# Patient Record
Sex: MALE | Race: BLACK OR AFRICAN AMERICAN | NOT HISPANIC OR LATINO | URBAN - METROPOLITAN AREA
[De-identification: names, ages, dates, MRNs, and addresses within clinical notes are randomized per-mention and may not be internally consistent; named-entity substitution may affect disease eponyms.]

---

## 2017-07-01 ENCOUNTER — OUTPATIENT (OUTPATIENT)
Dept: OUTPATIENT SERVICES | Facility: HOSPITAL | Age: 72
LOS: 1 days | End: 2017-07-01
Payer: MEDICAID

## 2017-07-12 DIAGNOSIS — R69 ILLNESS, UNSPECIFIED: ICD-10-CM

## 2017-08-01 PROCEDURE — G9001: CPT

## 2021-03-16 ENCOUNTER — APPOINTMENT (OUTPATIENT)
Dept: DISASTER EMERGENCY | Facility: OTHER | Age: 76
End: 2021-03-16
Payer: MEDICARE

## 2021-03-16 PROCEDURE — 0011A: CPT

## 2021-04-13 ENCOUNTER — APPOINTMENT (OUTPATIENT)
Dept: DISASTER EMERGENCY | Facility: OTHER | Age: 76
End: 2021-04-13

## 2023-01-01 ENCOUNTER — NON-APPOINTMENT (OUTPATIENT)
Age: 78
End: 2023-01-01

## 2023-01-01 ENCOUNTER — TRANSCRIPTION ENCOUNTER (OUTPATIENT)
Age: 78
End: 2023-01-01

## 2023-01-01 ENCOUNTER — RESULT REVIEW (OUTPATIENT)
Age: 78
End: 2023-01-01

## 2023-01-01 ENCOUNTER — APPOINTMENT (OUTPATIENT)
Age: 78
End: 2023-01-01
Payer: MEDICARE

## 2023-01-01 ENCOUNTER — APPOINTMENT (OUTPATIENT)
Age: 78
End: 2023-01-01

## 2023-01-01 ENCOUNTER — APPOINTMENT (OUTPATIENT)
Dept: OPHTHALMOLOGY | Facility: CLINIC | Age: 78
End: 2023-01-01
Payer: MEDICARE

## 2023-01-01 ENCOUNTER — INPATIENT (INPATIENT)
Facility: HOSPITAL | Age: 78
LOS: 4 days | Discharge: NURSING, REHAB OR LONG-TERM CARE | End: 2023-07-18
Attending: HOSPITALIST | Admitting: HOSPITALIST
Payer: MEDICARE

## 2023-01-01 ENCOUNTER — APPOINTMENT (OUTPATIENT)
Dept: INTERNAL MEDICINE | Facility: CLINIC | Age: 78
End: 2023-01-01

## 2023-01-01 ENCOUNTER — APPOINTMENT (OUTPATIENT)
Dept: NUCLEAR MEDICINE | Facility: CLINIC | Age: 78
End: 2023-01-01

## 2023-01-01 ENCOUNTER — INPATIENT (INPATIENT)
Facility: HOSPITAL | Age: 78
LOS: 6 days | Discharge: HOME | End: 2023-05-19
Attending: INTERNAL MEDICINE | Admitting: INTERNAL MEDICINE
Payer: MEDICARE

## 2023-01-01 ENCOUNTER — OUTPATIENT (OUTPATIENT)
Dept: OUTPATIENT SERVICES | Facility: HOSPITAL | Age: 78
LOS: 1 days | Discharge: HOME | End: 2023-01-01

## 2023-01-01 ENCOUNTER — APPOINTMENT (OUTPATIENT)
Dept: INTERNAL MEDICINE | Facility: CLINIC | Age: 78
End: 2023-01-01
Payer: MEDICARE

## 2023-01-01 ENCOUNTER — INPATIENT (INPATIENT)
Facility: HOSPITAL | Age: 78
LOS: 17 days | Discharge: ANOTHER HOSPITAL | DRG: 166 | End: 2023-05-12
Attending: INTERNAL MEDICINE | Admitting: STUDENT IN AN ORGANIZED HEALTH CARE EDUCATION/TRAINING PROGRAM
Payer: COMMERCIAL

## 2023-01-01 ENCOUNTER — EMERGENCY (EMERGENCY)
Facility: HOSPITAL | Age: 78
LOS: 0 days | End: 2023-08-08
Attending: STUDENT IN AN ORGANIZED HEALTH CARE EDUCATION/TRAINING PROGRAM
Payer: MEDICARE

## 2023-01-01 ENCOUNTER — EMERGENCY (EMERGENCY)
Facility: HOSPITAL | Age: 78
LOS: 0 days | Discharge: ANOTHER HOSPITAL | End: 2023-04-24
Attending: STUDENT IN AN ORGANIZED HEALTH CARE EDUCATION/TRAINING PROGRAM
Payer: MEDICARE

## 2023-01-01 VITALS
HEIGHT: 67 IN | DIASTOLIC BLOOD PRESSURE: 80 MMHG | SYSTOLIC BLOOD PRESSURE: 120 MMHG | HEART RATE: 66 BPM | OXYGEN SATURATION: 94 % | RESPIRATION RATE: 16 BRPM | TEMPERATURE: 98 F

## 2023-01-01 VITALS — HEIGHT: 68 IN | HEART RATE: 92 BPM | OXYGEN SATURATION: 99 % | RESPIRATION RATE: 20 BRPM | WEIGHT: 104.94 LBS

## 2023-01-01 VITALS
TEMPERATURE: 98 F | HEART RATE: 58 BPM | OXYGEN SATURATION: 97 % | SYSTOLIC BLOOD PRESSURE: 112 MMHG | RESPIRATION RATE: 18 BRPM | DIASTOLIC BLOOD PRESSURE: 61 MMHG

## 2023-01-01 VITALS
SYSTOLIC BLOOD PRESSURE: 111 MMHG | BODY MASS INDEX: 19.99 KG/M2 | RESPIRATION RATE: 16 BRPM | DIASTOLIC BLOOD PRESSURE: 71 MMHG | WEIGHT: 120 LBS | OXYGEN SATURATION: 97 % | HEART RATE: 75 BPM | TEMPERATURE: 97.2 F | HEIGHT: 65 IN

## 2023-01-01 VITALS
WEIGHT: 124.56 LBS | SYSTOLIC BLOOD PRESSURE: 103 MMHG | OXYGEN SATURATION: 98 % | TEMPERATURE: 97 F | RESPIRATION RATE: 16 BRPM | HEART RATE: 83 BPM | BODY MASS INDEX: 20.73 KG/M2 | DIASTOLIC BLOOD PRESSURE: 67 MMHG

## 2023-01-01 VITALS
DIASTOLIC BLOOD PRESSURE: 72 MMHG | HEART RATE: 58 BPM | WEIGHT: 122.14 LBS | OXYGEN SATURATION: 100 % | RESPIRATION RATE: 17 BRPM | SYSTOLIC BLOOD PRESSURE: 133 MMHG | HEIGHT: 67.01 IN | TEMPERATURE: 98 F

## 2023-01-01 VITALS
DIASTOLIC BLOOD PRESSURE: 72 MMHG | RESPIRATION RATE: 16 BRPM | WEIGHT: 150 LBS | OXYGEN SATURATION: 97 % | SYSTOLIC BLOOD PRESSURE: 116 MMHG | HEIGHT: 65 IN | HEART RATE: 73 BPM | BODY MASS INDEX: 24.99 KG/M2

## 2023-01-01 VITALS
TEMPERATURE: 98 F | RESPIRATION RATE: 16 BRPM | SYSTOLIC BLOOD PRESSURE: 143 MMHG | DIASTOLIC BLOOD PRESSURE: 87 MMHG | OXYGEN SATURATION: 98 % | HEART RATE: 72 BPM

## 2023-01-01 VITALS
SYSTOLIC BLOOD PRESSURE: 98 MMHG | TEMPERATURE: 98 F | RESPIRATION RATE: 19 BRPM | HEART RATE: 67 BPM | OXYGEN SATURATION: 98 % | DIASTOLIC BLOOD PRESSURE: 61 MMHG

## 2023-01-01 VITALS
DIASTOLIC BLOOD PRESSURE: 69 MMHG | RESPIRATION RATE: 17 BRPM | OXYGEN SATURATION: 96 % | WEIGHT: 149.91 LBS | SYSTOLIC BLOOD PRESSURE: 122 MMHG | HEIGHT: 67 IN | TEMPERATURE: 99 F | HEART RATE: 105 BPM

## 2023-01-01 VITALS
HEART RATE: 98 BPM | HEIGHT: 70 IN | SYSTOLIC BLOOD PRESSURE: 119 MMHG | OXYGEN SATURATION: 98 % | RESPIRATION RATE: 18 BRPM | WEIGHT: 117.07 LBS | DIASTOLIC BLOOD PRESSURE: 80 MMHG | TEMPERATURE: 98 F

## 2023-01-01 VITALS
RESPIRATION RATE: 18 BRPM | HEART RATE: 67 BPM | SYSTOLIC BLOOD PRESSURE: 101 MMHG | OXYGEN SATURATION: 99 % | TEMPERATURE: 98 F | DIASTOLIC BLOOD PRESSURE: 67 MMHG

## 2023-01-01 VITALS — HEART RATE: 36 BPM | RESPIRATION RATE: 4 BRPM

## 2023-01-01 DIAGNOSIS — Z78.9 OTHER SPECIFIED HEALTH STATUS: ICD-10-CM

## 2023-01-01 DIAGNOSIS — F03.90 UNSPECIFIED DEMENTIA WITHOUT BEHAVIORAL DISTURBANCE: ICD-10-CM

## 2023-01-01 DIAGNOSIS — J44.9 CHRONIC OBSTRUCTIVE PULMONARY DISEASE, UNSPECIFIED: ICD-10-CM

## 2023-01-01 DIAGNOSIS — D61.810 ANTINEOPLASTIC CHEMOTHERAPY INDUCED PANCYTOPENIA: ICD-10-CM

## 2023-01-01 DIAGNOSIS — M62.81 MUSCLE WEAKNESS (GENERALIZED): ICD-10-CM

## 2023-01-01 DIAGNOSIS — R62.7 ADULT FAILURE TO THRIVE: ICD-10-CM

## 2023-01-01 DIAGNOSIS — Z29.9 ENCOUNTER FOR PROPHYLACTIC MEASURES, UNSPECIFIED: ICD-10-CM

## 2023-01-01 DIAGNOSIS — Z66 DO NOT RESUSCITATE: ICD-10-CM

## 2023-01-01 DIAGNOSIS — F17.200 NICOTINE DEPENDENCE, UNSPECIFIED, UNCOMPLICATED: ICD-10-CM

## 2023-01-01 DIAGNOSIS — E43 UNSPECIFIED SEVERE PROTEIN-CALORIE MALNUTRITION: ICD-10-CM

## 2023-01-01 DIAGNOSIS — R73.09 OTHER ABNORMAL GLUCOSE: ICD-10-CM

## 2023-01-01 DIAGNOSIS — I10 ESSENTIAL (PRIMARY) HYPERTENSION: ICD-10-CM

## 2023-01-01 DIAGNOSIS — Z87.891 PERSONAL HISTORY OF NICOTINE DEPENDENCE: ICD-10-CM

## 2023-01-01 DIAGNOSIS — C34.90 MALIGNANT NEOPLASM OF UNSPECIFIED PART OF UNSPECIFIED BRONCHUS OR LUNG: ICD-10-CM

## 2023-01-01 DIAGNOSIS — Z51.5 ENCOUNTER FOR PALLIATIVE CARE: ICD-10-CM

## 2023-01-01 DIAGNOSIS — Z85.118 PERSONAL HISTORY OF OTHER MALIGNANT NEOPLASM OF BRONCHUS AND LUNG: ICD-10-CM

## 2023-01-01 DIAGNOSIS — R00.0 TACHYCARDIA, UNSPECIFIED: ICD-10-CM

## 2023-01-01 DIAGNOSIS — C34.91 MALIGNANT NEOPLASM OF UNSPECIFIED PART OF RIGHT BRONCHUS OR LUNG: ICD-10-CM

## 2023-01-01 DIAGNOSIS — D61.818 OTHER PANCYTOPENIA: ICD-10-CM

## 2023-01-01 DIAGNOSIS — C79.31 SECONDARY MALIGNANT NEOPLASM OF BRAIN: ICD-10-CM

## 2023-01-01 DIAGNOSIS — Z20.822 CONTACT WITH AND (SUSPECTED) EXPOSURE TO COVID-19: ICD-10-CM

## 2023-01-01 DIAGNOSIS — J43.9 EMPHYSEMA, UNSPECIFIED: ICD-10-CM

## 2023-01-01 DIAGNOSIS — Z51.11 ENCOUNTER FOR ANTINEOPLASTIC CHEMOTHERAPY: ICD-10-CM

## 2023-01-01 DIAGNOSIS — R11.2 NAUSEA WITH VOMITING, UNSPECIFIED: ICD-10-CM

## 2023-01-01 DIAGNOSIS — R53.1 WEAKNESS: ICD-10-CM

## 2023-01-01 DIAGNOSIS — F10.10 ALCOHOL ABUSE, UNCOMPLICATED: ICD-10-CM

## 2023-01-01 DIAGNOSIS — R73.01 IMPAIRED FASTING GLUCOSE: ICD-10-CM

## 2023-01-01 DIAGNOSIS — E86.0 DEHYDRATION: ICD-10-CM

## 2023-01-01 DIAGNOSIS — G81.91 HEMIPLEGIA, UNSPECIFIED AFFECTING RIGHT DOMINANT SIDE: ICD-10-CM

## 2023-01-01 DIAGNOSIS — D72.829 ELEVATED WHITE BLOOD CELL COUNT, UNSPECIFIED: ICD-10-CM

## 2023-01-01 DIAGNOSIS — G93.6 CEREBRAL EDEMA: ICD-10-CM

## 2023-01-01 DIAGNOSIS — G93.89 OTHER SPECIFIED DISORDERS OF BRAIN: ICD-10-CM

## 2023-01-01 DIAGNOSIS — F10.20 ALCOHOL DEPENDENCE, UNCOMPLICATED: ICD-10-CM

## 2023-01-01 DIAGNOSIS — E78.5 HYPERLIPIDEMIA, UNSPECIFIED: ICD-10-CM

## 2023-01-01 DIAGNOSIS — Z82.49 FAMILY HISTORY OF ISCHEMIC HEART DISEASE AND OTHER DISEASES OF THE CIRCULATORY SYSTEM: ICD-10-CM

## 2023-01-01 DIAGNOSIS — R09.02 HYPOXEMIA: ICD-10-CM

## 2023-01-01 DIAGNOSIS — Z00.00 ENCOUNTER FOR GENERAL ADULT MEDICAL EXAMINATION W/OUT ABNORMAL FINDINGS: ICD-10-CM

## 2023-01-01 DIAGNOSIS — Z80.49 FAMILY HISTORY OF MALIGNANT NEOPLASM OF OTHER GENITAL ORGANS: ICD-10-CM

## 2023-01-01 DIAGNOSIS — D70.9 NEUTROPENIA, UNSPECIFIED: ICD-10-CM

## 2023-01-01 DIAGNOSIS — J96.01 ACUTE RESPIRATORY FAILURE WITH HYPOXIA: ICD-10-CM

## 2023-01-01 DIAGNOSIS — D63.0 ANEMIA IN NEOPLASTIC DISEASE: ICD-10-CM

## 2023-01-01 DIAGNOSIS — Z71.89 OTHER SPECIFIED COUNSELING: ICD-10-CM

## 2023-01-01 LAB
A1C WITH ESTIMATED AVERAGE GLUCOSE RESULT: 5.7 % — HIGH (ref 4–5.6)
ALBUMIN SERPL ELPH-MCNC: 3.3 G/DL — SIGNIFICANT CHANGE UP (ref 3.3–5)
ALBUMIN SERPL ELPH-MCNC: 3.3 G/DL — SIGNIFICANT CHANGE UP (ref 3.3–5)
ALBUMIN SERPL ELPH-MCNC: 3.4 G/DL — SIGNIFICANT CHANGE UP (ref 3.3–5)
ALBUMIN SERPL ELPH-MCNC: 3.5 G/DL — SIGNIFICANT CHANGE UP (ref 3.3–5)
ALBUMIN SERPL ELPH-MCNC: 3.5 G/DL — SIGNIFICANT CHANGE UP (ref 3.3–5)
ALBUMIN SERPL ELPH-MCNC: 3.6 G/DL — SIGNIFICANT CHANGE UP (ref 3.3–5)
ALBUMIN SERPL ELPH-MCNC: 3.7 G/DL
ALBUMIN SERPL ELPH-MCNC: 3.7 G/DL — SIGNIFICANT CHANGE UP (ref 3.3–5)
ALBUMIN SERPL ELPH-MCNC: 3.8 G/DL — SIGNIFICANT CHANGE UP (ref 3.3–5)
ALBUMIN SERPL ELPH-MCNC: 4.1 G/DL — SIGNIFICANT CHANGE UP (ref 3.3–5)
ALP BLD-CCNC: 83 U/L
ALP SERPL-CCNC: 67 U/L — SIGNIFICANT CHANGE UP (ref 40–120)
ALP SERPL-CCNC: 72 U/L — SIGNIFICANT CHANGE UP (ref 40–120)
ALP SERPL-CCNC: 75 U/L — SIGNIFICANT CHANGE UP (ref 40–120)
ALP SERPL-CCNC: 76 U/L — SIGNIFICANT CHANGE UP (ref 40–120)
ALP SERPL-CCNC: 81 U/L — SIGNIFICANT CHANGE UP (ref 40–120)
ALP SERPL-CCNC: 81 U/L — SIGNIFICANT CHANGE UP (ref 40–120)
ALP SERPL-CCNC: 82 U/L — SIGNIFICANT CHANGE UP (ref 40–120)
ALP SERPL-CCNC: 86 U/L — SIGNIFICANT CHANGE UP (ref 40–120)
ALP SERPL-CCNC: 88 U/L — SIGNIFICANT CHANGE UP (ref 40–120)
ALP SERPL-CCNC: 89 U/L — SIGNIFICANT CHANGE UP (ref 40–120)
ALP SERPL-CCNC: 92 U/L — SIGNIFICANT CHANGE UP (ref 40–120)
ALP SERPL-CCNC: 94 U/L — SIGNIFICANT CHANGE UP (ref 40–120)
ALT FLD-CCNC: 10 U/L — SIGNIFICANT CHANGE UP (ref 10–45)
ALT FLD-CCNC: 13 U/L — SIGNIFICANT CHANGE UP (ref 10–45)
ALT FLD-CCNC: 15 U/L — SIGNIFICANT CHANGE UP (ref 4–41)
ALT FLD-CCNC: 16 U/L — SIGNIFICANT CHANGE UP (ref 10–45)
ALT FLD-CCNC: 16 U/L — SIGNIFICANT CHANGE UP (ref 4–41)
ALT FLD-CCNC: 17 U/L — SIGNIFICANT CHANGE UP (ref 4–41)
ALT FLD-CCNC: 17 U/L — SIGNIFICANT CHANGE UP (ref 4–41)
ALT FLD-CCNC: 19 U/L — SIGNIFICANT CHANGE UP (ref 4–41)
ALT FLD-CCNC: 22 U/L — SIGNIFICANT CHANGE UP (ref 4–41)
ALT FLD-CCNC: 24 U/L — SIGNIFICANT CHANGE UP (ref 12–78)
ALT FLD-CCNC: 25 U/L — SIGNIFICANT CHANGE UP (ref 12–78)
ALT FLD-CCNC: 27 U/L — SIGNIFICANT CHANGE UP (ref 12–78)
ALT SERPL-CCNC: 24 U/L
ANION GAP SERPL CALC-SCNC: 10 MMOL/L — SIGNIFICANT CHANGE UP (ref 5–17)
ANION GAP SERPL CALC-SCNC: 10 MMOL/L — SIGNIFICANT CHANGE UP (ref 7–14)
ANION GAP SERPL CALC-SCNC: 10 MMOL/L — SIGNIFICANT CHANGE UP (ref 7–14)
ANION GAP SERPL CALC-SCNC: 11 MMOL/L — SIGNIFICANT CHANGE UP (ref 5–17)
ANION GAP SERPL CALC-SCNC: 11 MMOL/L — SIGNIFICANT CHANGE UP (ref 7–14)
ANION GAP SERPL CALC-SCNC: 12 MMOL/L
ANION GAP SERPL CALC-SCNC: 12 MMOL/L — SIGNIFICANT CHANGE UP (ref 7–14)
ANION GAP SERPL CALC-SCNC: 13 MMOL/L
ANION GAP SERPL CALC-SCNC: 13 MMOL/L — SIGNIFICANT CHANGE UP (ref 5–17)
ANION GAP SERPL CALC-SCNC: 14 MMOL/L — SIGNIFICANT CHANGE UP (ref 5–17)
ANION GAP SERPL CALC-SCNC: 14 MMOL/L — SIGNIFICANT CHANGE UP (ref 5–17)
ANION GAP SERPL CALC-SCNC: 15 MMOL/L — SIGNIFICANT CHANGE UP (ref 5–17)
ANION GAP SERPL CALC-SCNC: 2 MMOL/L — LOW (ref 5–17)
ANION GAP SERPL CALC-SCNC: 5 MMOL/L — SIGNIFICANT CHANGE UP (ref 5–17)
ANION GAP SERPL CALC-SCNC: 6 MMOL/L — SIGNIFICANT CHANGE UP (ref 5–17)
ANION GAP SERPL CALC-SCNC: 7 MMOL/L — SIGNIFICANT CHANGE UP (ref 5–17)
ANION GAP SERPL CALC-SCNC: 8 MMOL/L — SIGNIFICANT CHANGE UP (ref 5–17)
ANISOCYTOSIS BLD QL: SLIGHT — SIGNIFICANT CHANGE UP
APPEARANCE UR: CLEAR — SIGNIFICANT CHANGE UP
APTT BLD: 22.8 SEC — LOW (ref 27.5–35.5)
APTT BLD: 24.3 SEC — LOW (ref 27.5–35.5)
APTT BLD: 28.1 SEC — SIGNIFICANT CHANGE UP (ref 27.5–35.5)
APTT BLD: 30.7 SEC — SIGNIFICANT CHANGE UP (ref 27.5–35.5)
AST SERPL-CCNC: 15 U/L — SIGNIFICANT CHANGE UP (ref 4–40)
AST SERPL-CCNC: 16 U/L — SIGNIFICANT CHANGE UP (ref 4–40)
AST SERPL-CCNC: 17 U/L — SIGNIFICANT CHANGE UP (ref 4–40)
AST SERPL-CCNC: 19 U/L
AST SERPL-CCNC: 19 U/L — SIGNIFICANT CHANGE UP (ref 10–40)
AST SERPL-CCNC: 20 U/L — SIGNIFICANT CHANGE UP (ref 4–40)
AST SERPL-CCNC: 26 U/L — SIGNIFICANT CHANGE UP (ref 10–40)
AST SERPL-CCNC: 34 U/L — SIGNIFICANT CHANGE UP (ref 15–37)
AST SERPL-CCNC: 35 U/L — SIGNIFICANT CHANGE UP (ref 15–37)
AST SERPL-CCNC: 37 U/L — SIGNIFICANT CHANGE UP (ref 15–37)
AST SERPL-CCNC: 41 U/L — HIGH (ref 10–40)
BACTERIA # UR AUTO: NEGATIVE — SIGNIFICANT CHANGE UP
BASOPHILS # BLD AUTO: 0 K/UL — SIGNIFICANT CHANGE UP (ref 0–0.2)
BASOPHILS # BLD AUTO: 0.01 K/UL — SIGNIFICANT CHANGE UP (ref 0–0.2)
BASOPHILS # BLD AUTO: 0.02 K/UL — SIGNIFICANT CHANGE UP (ref 0–0.2)
BASOPHILS NFR BLD AUTO: 0 % — SIGNIFICANT CHANGE UP (ref 0–2)
BASOPHILS NFR BLD AUTO: 0.1 % — SIGNIFICANT CHANGE UP (ref 0–2)
BASOPHILS NFR BLD AUTO: 0.1 % — SIGNIFICANT CHANGE UP (ref 0–2)
BASOPHILS NFR BLD AUTO: 0.2 % — SIGNIFICANT CHANGE UP (ref 0–2)
BASOPHILS NFR BLD AUTO: 0.3 % — SIGNIFICANT CHANGE UP (ref 0–2)
BASOPHILS NFR BLD AUTO: 0.4 % — SIGNIFICANT CHANGE UP (ref 0–2)
BASOPHILS NFR BLD AUTO: 0.4 % — SIGNIFICANT CHANGE UP (ref 0–2)
BILIRUB SERPL-MCNC: 0.3 MG/DL — SIGNIFICANT CHANGE UP (ref 0.2–1.2)
BILIRUB SERPL-MCNC: 0.3 MG/DL — SIGNIFICANT CHANGE UP (ref 0.2–1.2)
BILIRUB SERPL-MCNC: 0.4 MG/DL — SIGNIFICANT CHANGE UP (ref 0.2–1.2)
BILIRUB SERPL-MCNC: 0.5 MG/DL
BILIRUB SERPL-MCNC: 0.5 MG/DL — SIGNIFICANT CHANGE UP (ref 0.2–1.2)
BILIRUB SERPL-MCNC: 0.5 MG/DL — SIGNIFICANT CHANGE UP (ref 0.2–1.2)
BILIRUB SERPL-MCNC: 0.6 MG/DL — SIGNIFICANT CHANGE UP (ref 0.2–1.2)
BILIRUB SERPL-MCNC: 0.7 MG/DL — SIGNIFICANT CHANGE UP (ref 0.2–1.2)
BILIRUB SERPL-MCNC: 0.9 MG/DL — SIGNIFICANT CHANGE UP (ref 0.2–1.2)
BILIRUB SERPL-MCNC: 1 MG/DL — SIGNIFICANT CHANGE UP (ref 0.2–1.2)
BILIRUB SERPL-MCNC: 1.2 MG/DL — SIGNIFICANT CHANGE UP (ref 0.2–1.2)
BILIRUB UR-MCNC: NEGATIVE — SIGNIFICANT CHANGE UP
BLD GP AB SCN SERPL QL: NEGATIVE — SIGNIFICANT CHANGE UP
BUN SERPL-MCNC: 12 MG/DL
BUN SERPL-MCNC: 12 MG/DL — SIGNIFICANT CHANGE UP (ref 7–23)
BUN SERPL-MCNC: 13 MG/DL — SIGNIFICANT CHANGE UP (ref 7–23)
BUN SERPL-MCNC: 14 MG/DL — SIGNIFICANT CHANGE UP (ref 7–23)
BUN SERPL-MCNC: 16 MG/DL — SIGNIFICANT CHANGE UP (ref 7–23)
BUN SERPL-MCNC: 17 MG/DL — SIGNIFICANT CHANGE UP (ref 7–23)
BUN SERPL-MCNC: 17 MG/DL — SIGNIFICANT CHANGE UP (ref 7–23)
BUN SERPL-MCNC: 18 MG/DL — SIGNIFICANT CHANGE UP (ref 7–23)
BUN SERPL-MCNC: 21 MG/DL — SIGNIFICANT CHANGE UP (ref 7–23)
BUN SERPL-MCNC: 22 MG/DL
BUN SERPL-MCNC: 24 MG/DL — HIGH (ref 7–23)
BUN SERPL-MCNC: 27 MG/DL — HIGH (ref 7–23)
BUN SERPL-MCNC: 28 MG/DL — HIGH (ref 7–23)
BUN SERPL-MCNC: 29 MG/DL — HIGH (ref 7–23)
BUN SERPL-MCNC: 30 MG/DL — HIGH (ref 7–23)
BUN SERPL-MCNC: 30 MG/DL — HIGH (ref 7–23)
BUN SERPL-MCNC: 33 MG/DL — HIGH (ref 7–23)
BUN SERPL-MCNC: 35 MG/DL — HIGH (ref 7–23)
BUN SERPL-MCNC: 38 MG/DL — HIGH (ref 7–23)
CALCIUM SERPL-MCNC: 8.6 MG/DL — SIGNIFICANT CHANGE UP (ref 8.4–10.5)
CALCIUM SERPL-MCNC: 8.7 MG/DL — SIGNIFICANT CHANGE UP (ref 8.5–10.1)
CALCIUM SERPL-MCNC: 9 MG/DL — SIGNIFICANT CHANGE UP (ref 8.5–10.1)
CALCIUM SERPL-MCNC: 9.2 MG/DL — SIGNIFICANT CHANGE UP (ref 8.4–10.5)
CALCIUM SERPL-MCNC: 9.2 MG/DL — SIGNIFICANT CHANGE UP (ref 8.5–10.1)
CALCIUM SERPL-MCNC: 9.3 MG/DL — SIGNIFICANT CHANGE UP (ref 8.4–10.5)
CALCIUM SERPL-MCNC: 9.4 MG/DL — SIGNIFICANT CHANGE UP (ref 8.4–10.5)
CALCIUM SERPL-MCNC: 9.4 MG/DL — SIGNIFICANT CHANGE UP (ref 8.5–10.1)
CALCIUM SERPL-MCNC: 9.5 MG/DL
CALCIUM SERPL-MCNC: 9.5 MG/DL — SIGNIFICANT CHANGE UP (ref 8.5–10.1)
CALCIUM SERPL-MCNC: 9.7 MG/DL — SIGNIFICANT CHANGE UP (ref 8.4–10.5)
CALCIUM SERPL-MCNC: 9.7 MG/DL — SIGNIFICANT CHANGE UP (ref 8.4–10.5)
CALCIUM SERPL-MCNC: 9.9 MG/DL
CHLORIDE SERPL-SCNC: 101 MMOL/L — SIGNIFICANT CHANGE UP (ref 98–107)
CHLORIDE SERPL-SCNC: 101 MMOL/L — SIGNIFICANT CHANGE UP (ref 98–107)
CHLORIDE SERPL-SCNC: 102 MMOL/L — SIGNIFICANT CHANGE UP (ref 96–108)
CHLORIDE SERPL-SCNC: 102 MMOL/L — SIGNIFICANT CHANGE UP (ref 98–107)
CHLORIDE SERPL-SCNC: 102 MMOL/L — SIGNIFICANT CHANGE UP (ref 98–107)
CHLORIDE SERPL-SCNC: 103 MMOL/L
CHLORIDE SERPL-SCNC: 103 MMOL/L — SIGNIFICANT CHANGE UP (ref 96–108)
CHLORIDE SERPL-SCNC: 103 MMOL/L — SIGNIFICANT CHANGE UP (ref 98–107)
CHLORIDE SERPL-SCNC: 104 MMOL/L — SIGNIFICANT CHANGE UP (ref 96–108)
CHLORIDE SERPL-SCNC: 105 MMOL/L — SIGNIFICANT CHANGE UP (ref 96–108)
CHLORIDE SERPL-SCNC: 105 MMOL/L — SIGNIFICANT CHANGE UP (ref 96–108)
CHLORIDE SERPL-SCNC: 106 MMOL/L — SIGNIFICANT CHANGE UP (ref 96–108)
CHLORIDE SERPL-SCNC: 107 MMOL/L — SIGNIFICANT CHANGE UP (ref 96–108)
CHLORIDE SERPL-SCNC: 108 MMOL/L — SIGNIFICANT CHANGE UP (ref 96–108)
CHLORIDE SERPL-SCNC: 109 MMOL/L — HIGH (ref 96–108)
CHLORIDE SERPL-SCNC: 96 MMOL/L — SIGNIFICANT CHANGE UP (ref 96–108)
CHLORIDE SERPL-SCNC: 97 MMOL/L
CHLORIDE SERPL-SCNC: 99 MMOL/L — SIGNIFICANT CHANGE UP (ref 96–108)
CHLORIDE SERPL-SCNC: 99 MMOL/L — SIGNIFICANT CHANGE UP (ref 98–107)
CHOLEST SERPL-MCNC: 220 MG/DL
CO2 SERPL-SCNC: 22 MMOL/L — SIGNIFICANT CHANGE UP (ref 22–31)
CO2 SERPL-SCNC: 22 MMOL/L — SIGNIFICANT CHANGE UP (ref 22–31)
CO2 SERPL-SCNC: 23 MMOL/L — SIGNIFICANT CHANGE UP (ref 22–31)
CO2 SERPL-SCNC: 24 MMOL/L
CO2 SERPL-SCNC: 24 MMOL/L — SIGNIFICANT CHANGE UP (ref 22–31)
CO2 SERPL-SCNC: 24 MMOL/L — SIGNIFICANT CHANGE UP (ref 22–31)
CO2 SERPL-SCNC: 25 MMOL/L — SIGNIFICANT CHANGE UP (ref 22–31)
CO2 SERPL-SCNC: 26 MMOL/L
CO2 SERPL-SCNC: 26 MMOL/L — SIGNIFICANT CHANGE UP (ref 22–31)
CO2 SERPL-SCNC: 26 MMOL/L — SIGNIFICANT CHANGE UP (ref 22–31)
CO2 SERPL-SCNC: 27 MMOL/L — SIGNIFICANT CHANGE UP (ref 22–31)
CO2 SERPL-SCNC: 28 MMOL/L — SIGNIFICANT CHANGE UP (ref 22–31)
CO2 SERPL-SCNC: 29 MMOL/L — SIGNIFICANT CHANGE UP (ref 22–31)
CO2 SERPL-SCNC: 29 MMOL/L — SIGNIFICANT CHANGE UP (ref 22–31)
COLOR SPEC: YELLOW — SIGNIFICANT CHANGE UP
CREAT SERPL-MCNC: 0.8 MG/DL — SIGNIFICANT CHANGE UP (ref 0.5–1.3)
CREAT SERPL-MCNC: 0.87 MG/DL — SIGNIFICANT CHANGE UP (ref 0.5–1.3)
CREAT SERPL-MCNC: 0.9 MG/DL — SIGNIFICANT CHANGE UP (ref 0.5–1.3)
CREAT SERPL-MCNC: 0.9 MG/DL — SIGNIFICANT CHANGE UP (ref 0.5–1.3)
CREAT SERPL-MCNC: 0.91 MG/DL — SIGNIFICANT CHANGE UP (ref 0.5–1.3)
CREAT SERPL-MCNC: 0.96 MG/DL — SIGNIFICANT CHANGE UP (ref 0.5–1.3)
CREAT SERPL-MCNC: 0.97 MG/DL — SIGNIFICANT CHANGE UP (ref 0.5–1.3)
CREAT SERPL-MCNC: 1 MG/DL — SIGNIFICANT CHANGE UP (ref 0.5–1.3)
CREAT SERPL-MCNC: 1.01 MG/DL — SIGNIFICANT CHANGE UP (ref 0.5–1.3)
CREAT SERPL-MCNC: 1.02 MG/DL
CREAT SERPL-MCNC: 1.06 MG/DL — SIGNIFICANT CHANGE UP (ref 0.5–1.3)
CREAT SERPL-MCNC: 1.07 MG/DL — SIGNIFICANT CHANGE UP (ref 0.5–1.3)
CREAT SERPL-MCNC: 1.12 MG/DL — SIGNIFICANT CHANGE UP (ref 0.5–1.3)
CREAT SERPL-MCNC: 1.14 MG/DL — SIGNIFICANT CHANGE UP (ref 0.5–1.3)
CREAT SERPL-MCNC: 1.16 MG/DL — SIGNIFICANT CHANGE UP (ref 0.5–1.3)
CREAT SERPL-MCNC: 1.16 MG/DL — SIGNIFICANT CHANGE UP (ref 0.5–1.3)
CREAT SERPL-MCNC: 1.2 MG/DL — SIGNIFICANT CHANGE UP (ref 0.5–1.3)
CREAT SERPL-MCNC: 1.25 MG/DL — SIGNIFICANT CHANGE UP (ref 0.5–1.3)
CREAT SERPL-MCNC: 1.3 MG/DL — SIGNIFICANT CHANGE UP (ref 0.5–1.3)
CREAT SERPL-MCNC: 1.3 MG/DL — SIGNIFICANT CHANGE UP (ref 0.5–1.3)
CREAT SERPL-MCNC: 1.35 MG/DL
CULTURE RESULTS: SIGNIFICANT CHANGE UP
CULTURE RESULTS: SIGNIFICANT CHANGE UP
DACRYOCYTES BLD QL SMEAR: SLIGHT — SIGNIFICANT CHANGE UP
DIFF PNL FLD: NEGATIVE — SIGNIFICANT CHANGE UP
DRUG SCREEN, SERUM: SIGNIFICANT CHANGE UP
EGFR: 54 ML/MIN/1.73M2
EGFR: 56 ML/MIN/1.73M2 — LOW
EGFR: 56 ML/MIN/1.73M2 — LOW
EGFR: 59 ML/MIN/1.73M2 — LOW
EGFR: 62 ML/MIN/1.73M2 — SIGNIFICANT CHANGE UP
EGFR: 64 ML/MIN/1.73M2 — SIGNIFICANT CHANGE UP
EGFR: 64 ML/MIN/1.73M2 — SIGNIFICANT CHANGE UP
EGFR: 66 ML/MIN/1.73M2 — SIGNIFICANT CHANGE UP
EGFR: 67 ML/MIN/1.73M2 — SIGNIFICANT CHANGE UP
EGFR: 71 ML/MIN/1.73M2 — SIGNIFICANT CHANGE UP
EGFR: 72 ML/MIN/1.73M2 — SIGNIFICANT CHANGE UP
EGFR: 75 ML/MIN/1.73M2
EGFR: 76 ML/MIN/1.73M2 — SIGNIFICANT CHANGE UP
EGFR: 77 ML/MIN/1.73M2 — SIGNIFICANT CHANGE UP
EGFR: 80 ML/MIN/1.73M2 — SIGNIFICANT CHANGE UP
EGFR: 81 ML/MIN/1.73M2 — SIGNIFICANT CHANGE UP
EGFR: 86 ML/MIN/1.73M2 — SIGNIFICANT CHANGE UP
EGFR: 87 ML/MIN/1.73M2 — SIGNIFICANT CHANGE UP
EGFR: 87 ML/MIN/1.73M2 — SIGNIFICANT CHANGE UP
EGFR: 88 ML/MIN/1.73M2 — SIGNIFICANT CHANGE UP
EGFR: 91 ML/MIN/1.73M2 — SIGNIFICANT CHANGE UP
EOSINOPHIL # BLD AUTO: 0 K/UL — SIGNIFICANT CHANGE UP (ref 0–0.5)
EOSINOPHIL # BLD AUTO: 0.01 K/UL — SIGNIFICANT CHANGE UP (ref 0–0.5)
EOSINOPHIL # BLD AUTO: 0.04 K/UL — SIGNIFICANT CHANGE UP (ref 0–0.5)
EOSINOPHIL # BLD AUTO: 0.04 K/UL — SIGNIFICANT CHANGE UP (ref 0–0.5)
EOSINOPHIL # BLD AUTO: 0.05 K/UL — SIGNIFICANT CHANGE UP (ref 0–0.5)
EOSINOPHIL # BLD AUTO: 0.07 K/UL — SIGNIFICANT CHANGE UP (ref 0–0.5)
EOSINOPHIL NFR BLD AUTO: 0 % — SIGNIFICANT CHANGE UP (ref 0–6)
EOSINOPHIL NFR BLD AUTO: 0.1 % — SIGNIFICANT CHANGE UP (ref 0–6)
EOSINOPHIL NFR BLD AUTO: 0.2 % — SIGNIFICANT CHANGE UP (ref 0–6)
EOSINOPHIL NFR BLD AUTO: 0.6 % — SIGNIFICANT CHANGE UP (ref 0–6)
EOSINOPHIL NFR BLD AUTO: 0.6 % — SIGNIFICANT CHANGE UP (ref 0–6)
EOSINOPHIL NFR BLD AUTO: 0.7 % — SIGNIFICANT CHANGE UP (ref 0–6)
EOSINOPHIL NFR BLD AUTO: 0.8 % — SIGNIFICANT CHANGE UP (ref 0–6)
EOSINOPHIL NFR BLD AUTO: 1 % — SIGNIFICANT CHANGE UP (ref 0–6)
EOSINOPHIL NFR BLD AUTO: 4 % — SIGNIFICANT CHANGE UP (ref 0–6)
ESTIMATED AVERAGE GLUCOSE: 117 — SIGNIFICANT CHANGE UP
ESTIMATED AVERAGE GLUCOSE: 134 MG/DL
FERRITIN SERPL-MCNC: 1501 NG/ML — HIGH (ref 30–400)
FLUAV AG NPH QL: SIGNIFICANT CHANGE UP
FLUAV AG NPH QL: SIGNIFICANT CHANGE UP
FLUBV AG NPH QL: SIGNIFICANT CHANGE UP
FLUBV AG NPH QL: SIGNIFICANT CHANGE UP
FOLATE SERPL-MCNC: >20 NG/ML — SIGNIFICANT CHANGE UP
GLUCOSE BLDC GLUCOMTR-MCNC: 100 MG/DL — HIGH (ref 70–99)
GLUCOSE BLDC GLUCOMTR-MCNC: 105 MG/DL — HIGH (ref 70–99)
GLUCOSE BLDC GLUCOMTR-MCNC: 110 MG/DL — HIGH (ref 70–99)
GLUCOSE BLDC GLUCOMTR-MCNC: 113 MG/DL — HIGH (ref 70–99)
GLUCOSE BLDC GLUCOMTR-MCNC: 116 MG/DL — HIGH (ref 70–99)
GLUCOSE BLDC GLUCOMTR-MCNC: 133 MG/DL — HIGH (ref 70–99)
GLUCOSE BLDC GLUCOMTR-MCNC: 137 MG/DL — HIGH (ref 70–99)
GLUCOSE BLDC GLUCOMTR-MCNC: 146 MG/DL — HIGH (ref 70–99)
GLUCOSE BLDC GLUCOMTR-MCNC: 153 MG/DL — HIGH (ref 70–99)
GLUCOSE BLDC GLUCOMTR-MCNC: 94 MG/DL — SIGNIFICANT CHANGE UP (ref 70–99)
GLUCOSE BLDC GLUCOMTR-MCNC: 99 MG/DL — SIGNIFICANT CHANGE UP (ref 70–99)
GLUCOSE SERPL-MCNC: 100 MG/DL — HIGH (ref 70–99)
GLUCOSE SERPL-MCNC: 101 MG/DL — HIGH (ref 70–99)
GLUCOSE SERPL-MCNC: 101 MG/DL — HIGH (ref 70–99)
GLUCOSE SERPL-MCNC: 105 MG/DL — HIGH (ref 70–99)
GLUCOSE SERPL-MCNC: 108 MG/DL — HIGH (ref 70–99)
GLUCOSE SERPL-MCNC: 109 MG/DL — HIGH (ref 70–99)
GLUCOSE SERPL-MCNC: 110 MG/DL
GLUCOSE SERPL-MCNC: 110 MG/DL — HIGH (ref 70–99)
GLUCOSE SERPL-MCNC: 112 MG/DL — HIGH (ref 70–99)
GLUCOSE SERPL-MCNC: 115 MG/DL — HIGH (ref 70–99)
GLUCOSE SERPL-MCNC: 137 MG/DL — HIGH (ref 70–99)
GLUCOSE SERPL-MCNC: 147 MG/DL — HIGH (ref 70–99)
GLUCOSE SERPL-MCNC: 149 MG/DL — HIGH (ref 70–99)
GLUCOSE SERPL-MCNC: 153 MG/DL — HIGH (ref 70–99)
GLUCOSE SERPL-MCNC: 186 MG/DL — HIGH (ref 70–99)
GLUCOSE SERPL-MCNC: 83 MG/DL
GLUCOSE SERPL-MCNC: 84 MG/DL — SIGNIFICANT CHANGE UP (ref 70–99)
GLUCOSE SERPL-MCNC: 89 MG/DL — SIGNIFICANT CHANGE UP (ref 70–99)
GLUCOSE SERPL-MCNC: 94 MG/DL — SIGNIFICANT CHANGE UP (ref 70–99)
GLUCOSE SERPL-MCNC: 99 MG/DL — SIGNIFICANT CHANGE UP (ref 70–99)
GLUCOSE SERPL-MCNC: 99 MG/DL — SIGNIFICANT CHANGE UP (ref 70–99)
GLUCOSE UR QL: NEGATIVE MG/DL — SIGNIFICANT CHANGE UP
GRAM STN FLD: SIGNIFICANT CHANGE UP
HAPTOGLOB SERPL-MCNC: 249 MG/DL — HIGH (ref 34–200)
HBA1C MFR BLD HPLC: 6.3 %
HBV CORE IGG+IGM SER QL: REACTIVE
HBV SURFACE AB SER QL: NONREACTIVE
HBV SURFACE AG SER QL: NONREACTIVE
HCT VFR BLD CALC: 26.1 % — LOW (ref 39–50)
HCT VFR BLD CALC: 27 % — LOW (ref 39–50)
HCT VFR BLD CALC: 29.5 % — LOW (ref 39–50)
HCT VFR BLD CALC: 30.7 % — LOW (ref 39–50)
HCT VFR BLD CALC: 32.1 % — LOW (ref 39–50)
HCT VFR BLD CALC: 32.1 % — LOW (ref 39–50)
HCT VFR BLD CALC: 33.4 % — LOW (ref 39–50)
HCT VFR BLD CALC: 36.4 % — LOW (ref 39–50)
HCT VFR BLD CALC: 37 % — LOW (ref 39–50)
HCT VFR BLD CALC: 37.4 % — LOW (ref 39–50)
HCT VFR BLD CALC: 37.4 % — LOW (ref 39–50)
HCT VFR BLD CALC: 37.9 % — LOW (ref 39–50)
HCT VFR BLD CALC: 37.9 % — LOW (ref 39–50)
HCT VFR BLD CALC: 38.2 % — LOW (ref 39–50)
HCT VFR BLD CALC: 38.3 % — LOW (ref 39–50)
HCT VFR BLD CALC: 39.4 % — SIGNIFICANT CHANGE UP (ref 39–50)
HCT VFR BLD CALC: 41 % — SIGNIFICANT CHANGE UP (ref 39–50)
HCT VFR BLD CALC: 42.3 % — SIGNIFICANT CHANGE UP (ref 39–50)
HCT VFR BLD CALC: 42.6 % — SIGNIFICANT CHANGE UP (ref 39–50)
HCT VFR BLD CALC: 42.6 % — SIGNIFICANT CHANGE UP (ref 39–50)
HCT VFR BLD CALC: 43 % — SIGNIFICANT CHANGE UP (ref 39–50)
HCT VFR BLD CALC: 45.2 % — SIGNIFICANT CHANGE UP (ref 39–50)
HCV AB SER QL: NONREACTIVE
HCV S/CO RATIO: 0.09 S/CO
HDLC SERPL-MCNC: 55 MG/DL
HGB BLD-MCNC: 10.3 G/DL — LOW (ref 13–17)
HGB BLD-MCNC: 10.7 G/DL — LOW (ref 13–17)
HGB BLD-MCNC: 11.5 G/DL — LOW (ref 13–17)
HGB BLD-MCNC: 11.8 G/DL — LOW (ref 13–17)
HGB BLD-MCNC: 12.1 G/DL — LOW (ref 13–17)
HGB BLD-MCNC: 12.8 G/DL — LOW (ref 13–17)
HGB BLD-MCNC: 13 G/DL — SIGNIFICANT CHANGE UP (ref 13–17)
HGB BLD-MCNC: 13.3 G/DL — SIGNIFICANT CHANGE UP (ref 13–17)
HGB BLD-MCNC: 13.4 G/DL — SIGNIFICANT CHANGE UP (ref 13–17)
HGB BLD-MCNC: 13.5 G/DL — SIGNIFICANT CHANGE UP (ref 13–17)
HGB BLD-MCNC: 13.7 G/DL — SIGNIFICANT CHANGE UP (ref 13–17)
HGB BLD-MCNC: 14 G/DL — SIGNIFICANT CHANGE UP (ref 13–17)
HGB BLD-MCNC: 14.5 G/DL — SIGNIFICANT CHANGE UP (ref 13–17)
HGB BLD-MCNC: 14.9 G/DL — SIGNIFICANT CHANGE UP (ref 13–17)
HGB BLD-MCNC: 15.1 G/DL — SIGNIFICANT CHANGE UP (ref 13–17)
HGB BLD-MCNC: 15.9 G/DL — SIGNIFICANT CHANGE UP (ref 13–17)
HGB BLD-MCNC: 9.4 G/DL — LOW (ref 13–17)
HGB BLD-MCNC: 9.4 G/DL — LOW (ref 13–17)
HYPOCHROMIA BLD QL: SLIGHT — SIGNIFICANT CHANGE UP
IANC: 9.49 K/UL — HIGH (ref 1.8–7.4)
IMM GRANULOCYTES NFR BLD AUTO: 0 % — SIGNIFICANT CHANGE UP (ref 0–0.9)
IMM GRANULOCYTES NFR BLD AUTO: 0 % — SIGNIFICANT CHANGE UP (ref 0–0.9)
IMM GRANULOCYTES NFR BLD AUTO: 0.2 % — SIGNIFICANT CHANGE UP (ref 0–0.9)
IMM GRANULOCYTES NFR BLD AUTO: 0.3 % — SIGNIFICANT CHANGE UP (ref 0–0.9)
IMM GRANULOCYTES NFR BLD AUTO: 0.5 % — SIGNIFICANT CHANGE UP (ref 0–0.9)
INR BLD: 1 RATIO — SIGNIFICANT CHANGE UP (ref 0.88–1.16)
INR BLD: 1 RATIO — SIGNIFICANT CHANGE UP (ref 0.88–1.16)
INR BLD: 1.09 RATIO — SIGNIFICANT CHANGE UP (ref 0.88–1.16)
INR BLD: 1.13 RATIO — SIGNIFICANT CHANGE UP (ref 0.88–1.16)
IRON SATN MFR SERPL: 139 UG/DL — SIGNIFICANT CHANGE UP (ref 45–165)
IRON SATN MFR SERPL: 64 % — HIGH (ref 16–55)
KETONES UR-MCNC: NEGATIVE — SIGNIFICANT CHANGE UP
LDH SERPL L TO P-CCNC: 213 U/L — SIGNIFICANT CHANGE UP (ref 50–242)
LDLC SERPL CALC-MCNC: 145 MG/DL
LEUKOCYTE ESTERASE UR-ACNC: NEGATIVE — SIGNIFICANT CHANGE UP
LYMPHOCYTES # BLD AUTO: 0.46 K/UL — LOW (ref 1–3.3)
LYMPHOCYTES # BLD AUTO: 1.01 K/UL — SIGNIFICANT CHANGE UP (ref 1–3.3)
LYMPHOCYTES # BLD AUTO: 1.06 K/UL — SIGNIFICANT CHANGE UP (ref 1–3.3)
LYMPHOCYTES # BLD AUTO: 1.1 K/UL — SIGNIFICANT CHANGE UP (ref 1–3.3)
LYMPHOCYTES # BLD AUTO: 1.22 K/UL — SIGNIFICANT CHANGE UP (ref 1–3.3)
LYMPHOCYTES # BLD AUTO: 1.3 K/UL — SIGNIFICANT CHANGE UP (ref 1–3.3)
LYMPHOCYTES # BLD AUTO: 1.33 K/UL — SIGNIFICANT CHANGE UP (ref 1–3.3)
LYMPHOCYTES # BLD AUTO: 1.72 K/UL — SIGNIFICANT CHANGE UP (ref 1–3.3)
LYMPHOCYTES # BLD AUTO: 1.76 K/UL — SIGNIFICANT CHANGE UP (ref 1–3.3)
LYMPHOCYTES # BLD AUTO: 1.82 K/UL — SIGNIFICANT CHANGE UP (ref 1–3.3)
LYMPHOCYTES # BLD AUTO: 13.1 % — SIGNIFICANT CHANGE UP (ref 13–44)
LYMPHOCYTES # BLD AUTO: 14.9 % — SIGNIFICANT CHANGE UP (ref 13–44)
LYMPHOCYTES # BLD AUTO: 2.06 K/UL — SIGNIFICANT CHANGE UP (ref 1–3.3)
LYMPHOCYTES # BLD AUTO: 28.2 % — SIGNIFICANT CHANGE UP (ref 13–44)
LYMPHOCYTES # BLD AUTO: 32.9 % — SIGNIFICANT CHANGE UP (ref 13–44)
LYMPHOCYTES # BLD AUTO: 34.5 % — SIGNIFICANT CHANGE UP (ref 13–44)
LYMPHOCYTES # BLD AUTO: 36.5 % — SIGNIFICANT CHANGE UP (ref 13–44)
LYMPHOCYTES # BLD AUTO: 40 % — SIGNIFICANT CHANGE UP (ref 13–44)
LYMPHOCYTES # BLD AUTO: 75.7 % — HIGH (ref 13–44)
LYMPHOCYTES # BLD AUTO: 76 % — HIGH (ref 13–44)
LYMPHOCYTES # BLD AUTO: 77.2 % — HIGH (ref 13–44)
LYMPHOCYTES # BLD AUTO: 9.7 % — LOW (ref 13–44)
MAGNESIUM SERPL-MCNC: 1.7 MG/DL — SIGNIFICANT CHANGE UP (ref 1.6–2.6)
MAGNESIUM SERPL-MCNC: 1.9 MG/DL — SIGNIFICANT CHANGE UP (ref 1.6–2.6)
MAGNESIUM SERPL-MCNC: 2 MG/DL — SIGNIFICANT CHANGE UP (ref 1.6–2.6)
MAGNESIUM SERPL-MCNC: 2.1 MG/DL — SIGNIFICANT CHANGE UP (ref 1.6–2.6)
MAGNESIUM SERPL-MCNC: 2.1 MG/DL — SIGNIFICANT CHANGE UP (ref 1.6–2.6)
MANUAL SMEAR VERIFICATION: SIGNIFICANT CHANGE UP
MANUAL SMEAR VERIFICATION: SIGNIFICANT CHANGE UP
MCHC RBC-ENTMCNC: 28.1 PG — SIGNIFICANT CHANGE UP (ref 27–34)
MCHC RBC-ENTMCNC: 28.2 PG — SIGNIFICANT CHANGE UP (ref 27–34)
MCHC RBC-ENTMCNC: 28.4 PG — SIGNIFICANT CHANGE UP (ref 27–34)
MCHC RBC-ENTMCNC: 28.5 PG — SIGNIFICANT CHANGE UP (ref 27–34)
MCHC RBC-ENTMCNC: 28.6 PG — SIGNIFICANT CHANGE UP (ref 27–34)
MCHC RBC-ENTMCNC: 28.6 PG — SIGNIFICANT CHANGE UP (ref 27–34)
MCHC RBC-ENTMCNC: 28.7 PG — SIGNIFICANT CHANGE UP (ref 27–34)
MCHC RBC-ENTMCNC: 28.7 PG — SIGNIFICANT CHANGE UP (ref 27–34)
MCHC RBC-ENTMCNC: 28.8 PG — SIGNIFICANT CHANGE UP (ref 27–34)
MCHC RBC-ENTMCNC: 28.8 PG — SIGNIFICANT CHANGE UP (ref 27–34)
MCHC RBC-ENTMCNC: 29 PG — SIGNIFICANT CHANGE UP (ref 27–34)
MCHC RBC-ENTMCNC: 29 PG — SIGNIFICANT CHANGE UP (ref 27–34)
MCHC RBC-ENTMCNC: 29.1 PG — SIGNIFICANT CHANGE UP (ref 27–34)
MCHC RBC-ENTMCNC: 29.2 PG — SIGNIFICANT CHANGE UP (ref 27–34)
MCHC RBC-ENTMCNC: 29.3 PG — SIGNIFICANT CHANGE UP (ref 27–34)
MCHC RBC-ENTMCNC: 29.4 PG — SIGNIFICANT CHANGE UP (ref 27–34)
MCHC RBC-ENTMCNC: 34.8 G/DL — SIGNIFICANT CHANGE UP (ref 32–36)
MCHC RBC-ENTMCNC: 34.8 G/DL — SIGNIFICANT CHANGE UP (ref 32–36)
MCHC RBC-ENTMCNC: 34.9 G/DL — SIGNIFICANT CHANGE UP (ref 32–36)
MCHC RBC-ENTMCNC: 34.9 G/DL — SIGNIFICANT CHANGE UP (ref 32–36)
MCHC RBC-ENTMCNC: 35 GM/DL — SIGNIFICANT CHANGE UP (ref 32–36)
MCHC RBC-ENTMCNC: 35 GM/DL — SIGNIFICANT CHANGE UP (ref 32–36)
MCHC RBC-ENTMCNC: 35.1 GM/DL — SIGNIFICANT CHANGE UP (ref 32–36)
MCHC RBC-ENTMCNC: 35.2 GM/DL — SIGNIFICANT CHANGE UP (ref 32–36)
MCHC RBC-ENTMCNC: 35.2 GM/DL — SIGNIFICANT CHANGE UP (ref 32–36)
MCHC RBC-ENTMCNC: 35.4 G/DL — SIGNIFICANT CHANGE UP (ref 32–36)
MCHC RBC-ENTMCNC: 35.4 G/DL — SIGNIFICANT CHANGE UP (ref 32–36)
MCHC RBC-ENTMCNC: 35.5 GM/DL — SIGNIFICANT CHANGE UP (ref 32–36)
MCHC RBC-ENTMCNC: 35.6 GM/DL — SIGNIFICANT CHANGE UP (ref 32–36)
MCHC RBC-ENTMCNC: 35.6 GM/DL — SIGNIFICANT CHANGE UP (ref 32–36)
MCHC RBC-ENTMCNC: 35.7 GM/DL — SIGNIFICANT CHANGE UP (ref 32–36)
MCHC RBC-ENTMCNC: 35.8 G/DL — SIGNIFICANT CHANGE UP (ref 32–36)
MCHC RBC-ENTMCNC: 35.9 GM/DL — SIGNIFICANT CHANGE UP (ref 32–36)
MCHC RBC-ENTMCNC: 36 G/DL — SIGNIFICANT CHANGE UP (ref 32–36)
MCHC RBC-ENTMCNC: 36.1 GM/DL — HIGH (ref 32–36)
MCHC RBC-ENTMCNC: 36.2 G/DL — HIGH (ref 32–36)
MCHC RBC-ENTMCNC: 36.8 G/DL — HIGH (ref 32–36)
MCHC RBC-ENTMCNC: 37 GM/DL — HIGH (ref 32–36)
MCV RBC AUTO: 79.1 FL — LOW (ref 80–100)
MCV RBC AUTO: 79.3 FL — LOW (ref 80–100)
MCV RBC AUTO: 79.4 FL — LOW (ref 80–100)
MCV RBC AUTO: 79.5 FL — LOW (ref 80–100)
MCV RBC AUTO: 79.6 FL — LOW (ref 80–100)
MCV RBC AUTO: 80.1 FL — SIGNIFICANT CHANGE UP (ref 80–100)
MCV RBC AUTO: 80.3 FL — SIGNIFICANT CHANGE UP (ref 80–100)
MCV RBC AUTO: 80.4 FL — SIGNIFICANT CHANGE UP (ref 80–100)
MCV RBC AUTO: 80.8 FL — SIGNIFICANT CHANGE UP (ref 80–100)
MCV RBC AUTO: 80.9 FL — SIGNIFICANT CHANGE UP (ref 80–100)
MCV RBC AUTO: 81.4 FL — SIGNIFICANT CHANGE UP (ref 80–100)
MCV RBC AUTO: 81.4 FL — SIGNIFICANT CHANGE UP (ref 80–100)
MCV RBC AUTO: 81.5 FL — SIGNIFICANT CHANGE UP (ref 80–100)
MCV RBC AUTO: 81.6 FL — SIGNIFICANT CHANGE UP (ref 80–100)
MCV RBC AUTO: 81.9 FL — SIGNIFICANT CHANGE UP (ref 80–100)
MCV RBC AUTO: 82.1 FL — SIGNIFICANT CHANGE UP (ref 80–100)
MCV RBC AUTO: 82.2 FL — SIGNIFICANT CHANGE UP (ref 80–100)
MCV RBC AUTO: 82.3 FL — SIGNIFICANT CHANGE UP (ref 80–100)
MCV RBC AUTO: 82.3 FL — SIGNIFICANT CHANGE UP (ref 80–100)
MCV RBC AUTO: 82.5 FL — SIGNIFICANT CHANGE UP (ref 80–100)
MCV RBC AUTO: 82.7 FL — SIGNIFICANT CHANGE UP (ref 80–100)
MCV RBC AUTO: 82.9 FL — SIGNIFICANT CHANGE UP (ref 80–100)
MICROCYTES BLD QL: SLIGHT — SIGNIFICANT CHANGE UP
MONOCYTES # BLD AUTO: 0.07 K/UL — SIGNIFICANT CHANGE UP (ref 0–0.9)
MONOCYTES # BLD AUTO: 0.09 K/UL — SIGNIFICANT CHANGE UP (ref 0–0.9)
MONOCYTES # BLD AUTO: 0.14 K/UL — SIGNIFICANT CHANGE UP (ref 0–0.9)
MONOCYTES # BLD AUTO: 0.16 K/UL — SIGNIFICANT CHANGE UP (ref 0–0.9)
MONOCYTES # BLD AUTO: 0.2 K/UL — SIGNIFICANT CHANGE UP (ref 0–0.9)
MONOCYTES # BLD AUTO: 0.37 K/UL — SIGNIFICANT CHANGE UP (ref 0–0.9)
MONOCYTES # BLD AUTO: 0.49 K/UL — SIGNIFICANT CHANGE UP (ref 0–0.9)
MONOCYTES # BLD AUTO: 0.65 K/UL — SIGNIFICANT CHANGE UP (ref 0–0.9)
MONOCYTES # BLD AUTO: 0.68 K/UL — SIGNIFICANT CHANGE UP (ref 0–0.9)
MONOCYTES # BLD AUTO: 0.72 K/UL — SIGNIFICANT CHANGE UP (ref 0–0.9)
MONOCYTES # BLD AUTO: 0.88 K/UL — SIGNIFICANT CHANGE UP (ref 0–0.9)
MONOCYTES NFR BLD AUTO: 10.1 % — SIGNIFICANT CHANGE UP (ref 2–14)
MONOCYTES NFR BLD AUTO: 14.3 % — HIGH (ref 2–14)
MONOCYTES NFR BLD AUTO: 14.4 % — HIGH (ref 2–14)
MONOCYTES NFR BLD AUTO: 18.3 % — HIGH (ref 2–14)
MONOCYTES NFR BLD AUTO: 2.4 % — SIGNIFICANT CHANGE UP (ref 2–14)
MONOCYTES NFR BLD AUTO: 4.8 % — SIGNIFICANT CHANGE UP (ref 2–14)
MONOCYTES NFR BLD AUTO: 5.7 % — SIGNIFICANT CHANGE UP (ref 2–14)
MONOCYTES NFR BLD AUTO: 5.9 % — SIGNIFICANT CHANGE UP (ref 2–14)
MONOCYTES NFR BLD AUTO: 6 % — SIGNIFICANT CHANGE UP (ref 2–14)
MONOCYTES NFR BLD AUTO: 6 % — SIGNIFICANT CHANGE UP (ref 2–14)
MONOCYTES NFR BLD AUTO: 8 % — SIGNIFICANT CHANGE UP (ref 2–14)
MRSA PCR RESULT.: SIGNIFICANT CHANGE UP
MRSA PCR RESULT.: SIGNIFICANT CHANGE UP
NEUTROPHILS # BLD AUTO: 0.13 K/UL — LOW (ref 1.8–7.4)
NEUTROPHILS # BLD AUTO: 0.14 K/UL — LOW (ref 1.8–7.4)
NEUTROPHILS # BLD AUTO: 0.26 K/UL — LOW (ref 1.8–7.4)
NEUTROPHILS # BLD AUTO: 0.62 K/UL — LOW (ref 1.8–7.4)
NEUTROPHILS # BLD AUTO: 2.11 K/UL — SIGNIFICANT CHANGE UP (ref 1.8–7.4)
NEUTROPHILS # BLD AUTO: 2.47 K/UL — SIGNIFICANT CHANGE UP (ref 1.8–7.4)
NEUTROPHILS # BLD AUTO: 3.8 K/UL — SIGNIFICANT CHANGE UP (ref 1.8–7.4)
NEUTROPHILS # BLD AUTO: 3.91 K/UL — SIGNIFICANT CHANGE UP (ref 1.8–7.4)
NEUTROPHILS # BLD AUTO: 5.59 K/UL — SIGNIFICANT CHANGE UP (ref 1.8–7.4)
NEUTROPHILS # BLD AUTO: 8.34 K/UL — HIGH (ref 1.8–7.4)
NEUTROPHILS # BLD AUTO: 9.49 K/UL — HIGH (ref 1.8–7.4)
NEUTROPHILS NFR BLD AUTO: 16.5 % — LOW (ref 43–77)
NEUTROPHILS NFR BLD AUTO: 43.8 % — SIGNIFICANT CHANGE UP (ref 43–77)
NEUTROPHILS NFR BLD AUTO: 49.5 % — SIGNIFICANT CHANGE UP (ref 43–77)
NEUTROPHILS NFR BLD AUTO: 54 % — SIGNIFICANT CHANGE UP (ref 43–77)
NEUTROPHILS NFR BLD AUTO: 60.5 % — SIGNIFICANT CHANGE UP (ref 43–77)
NEUTROPHILS NFR BLD AUTO: 60.5 % — SIGNIFICANT CHANGE UP (ref 43–77)
NEUTROPHILS NFR BLD AUTO: 8 % — LOW (ref 43–77)
NEUTROPHILS NFR BLD AUTO: 81.8 % — HIGH (ref 43–77)
NEUTROPHILS NFR BLD AUTO: 82.3 % — HIGH (ref 43–77)
NEUTROPHILS NFR BLD AUTO: 83.7 % — HIGH (ref 43–77)
NEUTROPHILS NFR BLD AUTO: 9.3 % — LOW (ref 43–77)
NITRITE UR-MCNC: NEGATIVE — SIGNIFICANT CHANGE UP
NON-GYNECOLOGICAL CYTOLOGY STUDY: SIGNIFICANT CHANGE UP
NONHDLC SERPL-MCNC: 164 MG/DL
NRBC # BLD: 0 /100 WBCS — SIGNIFICANT CHANGE UP (ref 0–0)
NRBC # BLD: 0 /100 — SIGNIFICANT CHANGE UP (ref 0–0)
NRBC # BLD: 0 /100 — SIGNIFICANT CHANGE UP (ref 0–0)
NRBC # BLD: SIGNIFICANT CHANGE UP /100 WBCS (ref 0–0)
NRBC # BLD: SIGNIFICANT CHANGE UP /100 WBCS (ref 0–0)
NRBC # FLD: 0 K/UL — SIGNIFICANT CHANGE UP (ref 0–0)
PH UR: 7 — SIGNIFICANT CHANGE UP (ref 5–8)
PHOSPHATE SERPL-MCNC: 2.4 MG/DL — LOW (ref 2.5–4.5)
PHOSPHATE SERPL-MCNC: 2.6 MG/DL — SIGNIFICANT CHANGE UP (ref 2.5–4.5)
PHOSPHATE SERPL-MCNC: 2.7 MG/DL — SIGNIFICANT CHANGE UP (ref 2.5–4.5)
PHOSPHATE SERPL-MCNC: 2.9 MG/DL — SIGNIFICANT CHANGE UP (ref 2.5–4.5)
PHOSPHATE SERPL-MCNC: 2.9 MG/DL — SIGNIFICANT CHANGE UP (ref 2.5–4.5)
PHOSPHATE SERPL-MCNC: 3.2 MG/DL — SIGNIFICANT CHANGE UP (ref 2.5–4.5)
PHOSPHATE SERPL-MCNC: 3.3 MG/DL — SIGNIFICANT CHANGE UP (ref 2.5–4.5)
PHOSPHATE SERPL-MCNC: 3.4 MG/DL — SIGNIFICANT CHANGE UP (ref 2.5–4.5)
PHOSPHATE SERPL-MCNC: 3.7 MG/DL — SIGNIFICANT CHANGE UP (ref 2.5–4.5)
PHOSPHATE SERPL-MCNC: 3.8 MG/DL — SIGNIFICANT CHANGE UP (ref 2.5–4.5)
PLAT MORPH BLD: NORMAL — SIGNIFICANT CHANGE UP
PLAT MORPH BLD: NORMAL — SIGNIFICANT CHANGE UP
PLATELET # BLD AUTO: 111 K/UL — LOW (ref 150–400)
PLATELET # BLD AUTO: 132 K/UL — LOW (ref 150–400)
PLATELET # BLD AUTO: 133 K/UL — LOW (ref 150–400)
PLATELET # BLD AUTO: 133 K/UL — LOW (ref 150–400)
PLATELET # BLD AUTO: 151 K/UL — SIGNIFICANT CHANGE UP (ref 150–400)
PLATELET # BLD AUTO: 152 K/UL — SIGNIFICANT CHANGE UP (ref 150–400)
PLATELET # BLD AUTO: 165 K/UL — SIGNIFICANT CHANGE UP (ref 150–400)
PLATELET # BLD AUTO: 166 K/UL — SIGNIFICANT CHANGE UP (ref 150–400)
PLATELET # BLD AUTO: 167 K/UL — SIGNIFICANT CHANGE UP (ref 150–400)
PLATELET # BLD AUTO: 178 K/UL — SIGNIFICANT CHANGE UP (ref 150–400)
PLATELET # BLD AUTO: 187 K/UL — SIGNIFICANT CHANGE UP (ref 150–400)
PLATELET # BLD AUTO: 192 K/UL — SIGNIFICANT CHANGE UP (ref 150–400)
PLATELET # BLD AUTO: 198 K/UL — SIGNIFICANT CHANGE UP (ref 150–400)
PLATELET # BLD AUTO: 202 K/UL — SIGNIFICANT CHANGE UP (ref 150–400)
PLATELET # BLD AUTO: 216 K/UL — SIGNIFICANT CHANGE UP (ref 150–400)
PLATELET # BLD AUTO: 238 K/UL — SIGNIFICANT CHANGE UP (ref 150–400)
PLATELET # BLD AUTO: 238 K/UL — SIGNIFICANT CHANGE UP (ref 150–400)
PLATELET # BLD AUTO: 48 K/UL — LOW (ref 150–400)
PLATELET # BLD AUTO: 51 K/UL — LOW (ref 150–400)
PLATELET # BLD AUTO: 63 K/UL — LOW (ref 150–400)
PLATELET # BLD AUTO: 64 K/UL — LOW (ref 150–400)
PLATELET # BLD AUTO: 78 K/UL — LOW (ref 150–400)
POIKILOCYTOSIS BLD QL AUTO: SLIGHT — SIGNIFICANT CHANGE UP
POTASSIUM SERPL-MCNC: 3.4 MMOL/L — LOW (ref 3.5–5.3)
POTASSIUM SERPL-MCNC: 3.7 MMOL/L — SIGNIFICANT CHANGE UP (ref 3.5–5.3)
POTASSIUM SERPL-MCNC: 3.9 MMOL/L — SIGNIFICANT CHANGE UP (ref 3.5–5.3)
POTASSIUM SERPL-MCNC: 3.9 MMOL/L — SIGNIFICANT CHANGE UP (ref 3.5–5.3)
POTASSIUM SERPL-MCNC: 4.1 MMOL/L — SIGNIFICANT CHANGE UP (ref 3.5–5.3)
POTASSIUM SERPL-MCNC: 4.2 MMOL/L — SIGNIFICANT CHANGE UP (ref 3.5–5.3)
POTASSIUM SERPL-MCNC: 4.2 MMOL/L — SIGNIFICANT CHANGE UP (ref 3.5–5.3)
POTASSIUM SERPL-MCNC: 4.3 MMOL/L — SIGNIFICANT CHANGE UP (ref 3.5–5.3)
POTASSIUM SERPL-MCNC: 4.5 MMOL/L — SIGNIFICANT CHANGE UP (ref 3.5–5.3)
POTASSIUM SERPL-MCNC: 4.5 MMOL/L — SIGNIFICANT CHANGE UP (ref 3.5–5.3)
POTASSIUM SERPL-MCNC: 4.6 MMOL/L — SIGNIFICANT CHANGE UP (ref 3.5–5.3)
POTASSIUM SERPL-MCNC: 4.6 MMOL/L — SIGNIFICANT CHANGE UP (ref 3.5–5.3)
POTASSIUM SERPL-MCNC: 5.2 MMOL/L — SIGNIFICANT CHANGE UP (ref 3.5–5.3)
POTASSIUM SERPL-SCNC: 3.4 MMOL/L — LOW (ref 3.5–5.3)
POTASSIUM SERPL-SCNC: 3.7 MMOL/L — SIGNIFICANT CHANGE UP (ref 3.5–5.3)
POTASSIUM SERPL-SCNC: 3.9 MMOL/L — SIGNIFICANT CHANGE UP (ref 3.5–5.3)
POTASSIUM SERPL-SCNC: 3.9 MMOL/L — SIGNIFICANT CHANGE UP (ref 3.5–5.3)
POTASSIUM SERPL-SCNC: 4.1 MMOL/L — SIGNIFICANT CHANGE UP (ref 3.5–5.3)
POTASSIUM SERPL-SCNC: 4.2 MMOL/L — SIGNIFICANT CHANGE UP (ref 3.5–5.3)
POTASSIUM SERPL-SCNC: 4.2 MMOL/L — SIGNIFICANT CHANGE UP (ref 3.5–5.3)
POTASSIUM SERPL-SCNC: 4.3 MMOL/L — SIGNIFICANT CHANGE UP (ref 3.5–5.3)
POTASSIUM SERPL-SCNC: 4.4 MMOL/L
POTASSIUM SERPL-SCNC: 4.5 MMOL/L — SIGNIFICANT CHANGE UP (ref 3.5–5.3)
POTASSIUM SERPL-SCNC: 4.5 MMOL/L — SIGNIFICANT CHANGE UP (ref 3.5–5.3)
POTASSIUM SERPL-SCNC: 4.6 MMOL/L
POTASSIUM SERPL-SCNC: 4.6 MMOL/L — SIGNIFICANT CHANGE UP (ref 3.5–5.3)
POTASSIUM SERPL-SCNC: 4.6 MMOL/L — SIGNIFICANT CHANGE UP (ref 3.5–5.3)
POTASSIUM SERPL-SCNC: 5.2 MMOL/L — SIGNIFICANT CHANGE UP (ref 3.5–5.3)
PROT SERPL-MCNC: 5.6 G/DL — LOW (ref 6–8.3)
PROT SERPL-MCNC: 5.8 G/DL — LOW (ref 6–8.3)
PROT SERPL-MCNC: 6 G/DL — SIGNIFICANT CHANGE UP (ref 6–8.3)
PROT SERPL-MCNC: 6.1 G/DL — SIGNIFICANT CHANGE UP (ref 6–8.3)
PROT SERPL-MCNC: 6.2 G/DL
PROT SERPL-MCNC: 6.2 G/DL — SIGNIFICANT CHANGE UP (ref 6–8.3)
PROT SERPL-MCNC: 6.2 G/DL — SIGNIFICANT CHANGE UP (ref 6–8.3)
PROT SERPL-MCNC: 6.6 G/DL — SIGNIFICANT CHANGE UP (ref 6–8.3)
PROT SERPL-MCNC: 6.8 G/DL — SIGNIFICANT CHANGE UP (ref 6–8.3)
PROT SERPL-MCNC: 7.2 G/DL — SIGNIFICANT CHANGE UP (ref 6–8.3)
PROT SERPL-MCNC: 7.3 GM/DL — SIGNIFICANT CHANGE UP (ref 6–8.3)
PROT SERPL-MCNC: 7.6 GM/DL — SIGNIFICANT CHANGE UP (ref 6–8.3)
PROT SERPL-MCNC: 7.9 GM/DL — SIGNIFICANT CHANGE UP (ref 6–8.3)
PROT UR-MCNC: 15 MG/DL
PROTHROM AB SERPL-ACNC: 11.5 SEC — SIGNIFICANT CHANGE UP (ref 10.5–13.4)
PROTHROM AB SERPL-ACNC: 11.5 SEC — SIGNIFICANT CHANGE UP (ref 10.5–13.4)
PROTHROM AB SERPL-ACNC: 13.1 SEC — SIGNIFICANT CHANGE UP (ref 10.5–13.4)
PROTHROM AB SERPL-ACNC: 13.1 SEC — SIGNIFICANT CHANGE UP (ref 10.5–13.4)
RBC # BLD: 3.28 M/UL — LOW (ref 4.2–5.8)
RBC # BLD: 3.28 M/UL — LOW (ref 4.2–5.8)
RBC # BLD: 3.29 M/UL — LOW (ref 4.2–5.8)
RBC # BLD: 3.65 M/UL — LOW (ref 4.2–5.8)
RBC # BLD: 3.77 M/UL — LOW (ref 4.2–5.8)
RBC # BLD: 3.97 M/UL — LOW (ref 4.2–5.8)
RBC # BLD: 4.04 M/UL — LOW (ref 4.2–5.8)
RBC # BLD: 4.16 M/UL — LOW (ref 4.2–5.8)
RBC # BLD: 4.39 M/UL — SIGNIFICANT CHANGE UP (ref 4.2–5.8)
RBC # BLD: 4.55 M/UL — SIGNIFICANT CHANGE UP (ref 4.2–5.8)
RBC # BLD: 4.66 M/UL — SIGNIFICANT CHANGE UP (ref 4.2–5.8)
RBC # BLD: 4.69 M/UL — SIGNIFICANT CHANGE UP (ref 4.2–5.8)
RBC # BLD: 4.7 M/UL — SIGNIFICANT CHANGE UP (ref 4.2–5.8)
RBC # BLD: 4.73 M/UL — SIGNIFICANT CHANGE UP (ref 4.2–5.8)
RBC # BLD: 4.73 M/UL — SIGNIFICANT CHANGE UP (ref 4.2–5.8)
RBC # BLD: 4.76 M/UL — SIGNIFICANT CHANGE UP (ref 4.2–5.8)
RBC # BLD: 4.81 M/UL — SIGNIFICANT CHANGE UP (ref 4.2–5.8)
RBC # BLD: 4.98 M/UL — SIGNIFICANT CHANGE UP (ref 4.2–5.8)
RBC # BLD: 5.15 M/UL — SIGNIFICANT CHANGE UP (ref 4.2–5.8)
RBC # BLD: 5.22 M/UL — SIGNIFICANT CHANGE UP (ref 4.2–5.8)
RBC # BLD: 5.24 M/UL — SIGNIFICANT CHANGE UP (ref 4.2–5.8)
RBC # BLD: 5.26 M/UL — SIGNIFICANT CHANGE UP (ref 4.2–5.8)
RBC # BLD: 5.48 M/UL — SIGNIFICANT CHANGE UP (ref 4.2–5.8)
RBC # FLD: 13.4 % — SIGNIFICANT CHANGE UP (ref 10.3–14.5)
RBC # FLD: 13.5 % — SIGNIFICANT CHANGE UP (ref 10.3–14.5)
RBC # FLD: 13.6 % — SIGNIFICANT CHANGE UP (ref 10.3–14.5)
RBC # FLD: 13.7 % — SIGNIFICANT CHANGE UP (ref 10.3–14.5)
RBC # FLD: 14.1 % — SIGNIFICANT CHANGE UP (ref 10.3–14.5)
RBC # FLD: 14.1 % — SIGNIFICANT CHANGE UP (ref 10.3–14.5)
RBC # FLD: 14.2 % — SIGNIFICANT CHANGE UP (ref 10.3–14.5)
RBC # FLD: 14.3 % — SIGNIFICANT CHANGE UP (ref 10.3–14.5)
RBC # FLD: 14.5 % — SIGNIFICANT CHANGE UP (ref 10.3–14.5)
RBC # FLD: 14.5 % — SIGNIFICANT CHANGE UP (ref 10.3–14.5)
RBC # FLD: 14.6 % — HIGH (ref 10.3–14.5)
RBC # FLD: 14.6 % — HIGH (ref 10.3–14.5)
RBC # FLD: 14.7 % — HIGH (ref 10.3–14.5)
RBC # FLD: 14.9 % — HIGH (ref 10.3–14.5)
RBC # FLD: 15 % — HIGH (ref 10.3–14.5)
RBC # FLD: 15.2 % — HIGH (ref 10.3–14.5)
RBC # FLD: 15.7 % — HIGH (ref 10.3–14.5)
RBC BLD AUTO: ABNORMAL
RBC BLD AUTO: NORMAL — SIGNIFICANT CHANGE UP
RBC CASTS # UR COMP ASSIST: NEGATIVE /HPF — SIGNIFICANT CHANGE UP (ref 0–4)
RETICS #: 7.5 K/UL — LOW (ref 25–125)
RETICS/RBC NFR: 0.2 % — LOW (ref 0.5–2.5)
RH IG SCN BLD-IMP: POSITIVE — SIGNIFICANT CHANGE UP
S AUREUS DNA NOSE QL NAA+PROBE: DETECTED
S AUREUS DNA NOSE QL NAA+PROBE: DETECTED
SARS-COV-2 RNA SPEC QL NAA+PROBE: SIGNIFICANT CHANGE UP
SCHISTOCYTES BLD QL AUTO: SLIGHT — SIGNIFICANT CHANGE UP
SODIUM SERPL-SCNC: 134 MMOL/L
SODIUM SERPL-SCNC: 134 MMOL/L — LOW (ref 135–145)
SODIUM SERPL-SCNC: 135 MMOL/L — SIGNIFICANT CHANGE UP (ref 135–145)
SODIUM SERPL-SCNC: 135 MMOL/L — SIGNIFICANT CHANGE UP (ref 135–145)
SODIUM SERPL-SCNC: 137 MMOL/L — SIGNIFICANT CHANGE UP (ref 135–145)
SODIUM SERPL-SCNC: 138 MMOL/L — SIGNIFICANT CHANGE UP (ref 135–145)
SODIUM SERPL-SCNC: 139 MMOL/L — SIGNIFICANT CHANGE UP (ref 135–145)
SODIUM SERPL-SCNC: 139 MMOL/L — SIGNIFICANT CHANGE UP (ref 135–145)
SODIUM SERPL-SCNC: 140 MMOL/L — SIGNIFICANT CHANGE UP (ref 135–145)
SODIUM SERPL-SCNC: 141 MMOL/L
SODIUM SERPL-SCNC: 141 MMOL/L — SIGNIFICANT CHANGE UP (ref 135–145)
SODIUM SERPL-SCNC: 142 MMOL/L — SIGNIFICANT CHANGE UP (ref 135–145)
SP GR SPEC: 1 — LOW (ref 1.01–1.02)
SPECIMEN SOURCE: SIGNIFICANT CHANGE UP
TIBC SERPL-MCNC: 219 UG/DL — LOW (ref 220–430)
TRIGL SERPL-MCNC: 96 MG/DL
TROPONIN I, HIGH SENSITIVITY RESULT: 10.4 NG/L — SIGNIFICANT CHANGE UP
TROPONIN I, HIGH SENSITIVITY RESULT: 6.8 NG/L — SIGNIFICANT CHANGE UP
TSH SERPL-ACNC: 0.96 UIU/ML
TSH SERPL-MCNC: 1.25 UIU/ML — SIGNIFICANT CHANGE UP (ref 0.36–3.74)
UIBC SERPL-MCNC: 80 UG/DL — LOW (ref 110–370)
UROBILINOGEN FLD QL: NEGATIVE MG/DL — SIGNIFICANT CHANGE UP
VARIANT LYMPHS # BLD: 4 % — SIGNIFICANT CHANGE UP (ref 0–6)
VIT B12 SERPL-MCNC: 1176 PG/ML — SIGNIFICANT CHANGE UP (ref 232–1245)
WBC # BLD: 1.12 K/UL — LOW (ref 3.8–10.5)
WBC # BLD: 1.14 K/UL — LOW (ref 3.8–10.5)
WBC # BLD: 1.28 K/UL — LOW (ref 3.8–10.5)
WBC # BLD: 1.4 K/UL — LOW (ref 3.8–10.5)
WBC # BLD: 1.58 K/UL — LOW (ref 3.8–10.5)
WBC # BLD: 1.71 K/UL — LOW (ref 3.8–10.5)
WBC # BLD: 10.19 K/UL — SIGNIFICANT CHANGE UP (ref 3.8–10.5)
WBC # BLD: 10.53 K/UL — HIGH (ref 3.8–10.5)
WBC # BLD: 11.34 K/UL — HIGH (ref 3.8–10.5)
WBC # BLD: 12.56 K/UL — HIGH (ref 3.8–10.5)
WBC # BLD: 12.72 K/UL — HIGH (ref 3.8–10.5)
WBC # BLD: 4.82 K/UL — SIGNIFICANT CHANGE UP (ref 3.8–10.5)
WBC # BLD: 4.99 K/UL — SIGNIFICANT CHANGE UP (ref 3.8–10.5)
WBC # BLD: 6.27 K/UL — SIGNIFICANT CHANGE UP (ref 3.8–10.5)
WBC # BLD: 6.46 K/UL — SIGNIFICANT CHANGE UP (ref 3.8–10.5)
WBC # BLD: 6.79 K/UL — SIGNIFICANT CHANGE UP (ref 3.8–10.5)
WBC # BLD: 7.99 K/UL — SIGNIFICANT CHANGE UP (ref 3.8–10.5)
WBC # BLD: 8.39 K/UL — SIGNIFICANT CHANGE UP (ref 3.8–10.5)
WBC # BLD: 8.43 K/UL — SIGNIFICANT CHANGE UP (ref 3.8–10.5)
WBC # BLD: 8.77 K/UL — SIGNIFICANT CHANGE UP (ref 3.8–10.5)
WBC # BLD: 9.1 K/UL — SIGNIFICANT CHANGE UP (ref 3.8–10.5)
WBC # BLD: 9.65 K/UL — SIGNIFICANT CHANGE UP (ref 3.8–10.5)
WBC # FLD AUTO: 1.12 K/UL — LOW (ref 3.8–10.5)
WBC # FLD AUTO: 1.14 K/UL — LOW (ref 3.8–10.5)
WBC # FLD AUTO: 1.28 K/UL — LOW (ref 3.8–10.5)
WBC # FLD AUTO: 1.4 K/UL — LOW (ref 3.8–10.5)
WBC # FLD AUTO: 1.58 K/UL — LOW (ref 3.8–10.5)
WBC # FLD AUTO: 1.71 K/UL — LOW (ref 3.8–10.5)
WBC # FLD AUTO: 10.19 K/UL — SIGNIFICANT CHANGE UP (ref 3.8–10.5)
WBC # FLD AUTO: 10.53 K/UL — HIGH (ref 3.8–10.5)
WBC # FLD AUTO: 11.34 K/UL — HIGH (ref 3.8–10.5)
WBC # FLD AUTO: 12.56 K/UL — HIGH (ref 3.8–10.5)
WBC # FLD AUTO: 12.72 K/UL — HIGH (ref 3.8–10.5)
WBC # FLD AUTO: 4.82 K/UL — SIGNIFICANT CHANGE UP (ref 3.8–10.5)
WBC # FLD AUTO: 4.99 K/UL — SIGNIFICANT CHANGE UP (ref 3.8–10.5)
WBC # FLD AUTO: 6.27 K/UL — SIGNIFICANT CHANGE UP (ref 3.8–10.5)
WBC # FLD AUTO: 6.46 K/UL — SIGNIFICANT CHANGE UP (ref 3.8–10.5)
WBC # FLD AUTO: 6.79 K/UL — SIGNIFICANT CHANGE UP (ref 3.8–10.5)
WBC # FLD AUTO: 7.99 K/UL — SIGNIFICANT CHANGE UP (ref 3.8–10.5)
WBC # FLD AUTO: 8.39 K/UL — SIGNIFICANT CHANGE UP (ref 3.8–10.5)
WBC # FLD AUTO: 8.43 K/UL — SIGNIFICANT CHANGE UP (ref 3.8–10.5)
WBC # FLD AUTO: 8.77 K/UL — SIGNIFICANT CHANGE UP (ref 3.8–10.5)
WBC # FLD AUTO: 9.1 K/UL — SIGNIFICANT CHANGE UP (ref 3.8–10.5)
WBC # FLD AUTO: 9.65 K/UL — SIGNIFICANT CHANGE UP (ref 3.8–10.5)
WBC UR QL: SIGNIFICANT CHANGE UP

## 2023-01-01 PROCEDURE — 99291 CRITICAL CARE FIRST HOUR: CPT

## 2023-01-01 PROCEDURE — U0005: CPT

## 2023-01-01 PROCEDURE — 99232 SBSQ HOSP IP/OBS MODERATE 35: CPT

## 2023-01-01 PROCEDURE — 80048 BASIC METABOLIC PNL TOTAL CA: CPT

## 2023-01-01 PROCEDURE — 71260 CT THORAX DX C+: CPT | Mod: MA

## 2023-01-01 PROCEDURE — 97110 THERAPEUTIC EXERCISES: CPT

## 2023-01-01 PROCEDURE — 99232 SBSQ HOSP IP/OBS MODERATE 35: CPT | Mod: GC

## 2023-01-01 PROCEDURE — C9399: CPT

## 2023-01-01 PROCEDURE — 97116 GAIT TRAINING THERAPY: CPT

## 2023-01-01 PROCEDURE — 88173 CYTOPATH EVAL FNA REPORT: CPT

## 2023-01-01 PROCEDURE — 70450 CT HEAD/BRAIN W/O DYE: CPT | Mod: 26,MA,59

## 2023-01-01 PROCEDURE — 93010 ELECTROCARDIOGRAM REPORT: CPT

## 2023-01-01 PROCEDURE — 85027 COMPLETE CBC AUTOMATED: CPT

## 2023-01-01 PROCEDURE — 99214 OFFICE O/P EST MOD 30 MIN: CPT

## 2023-01-01 PROCEDURE — 99233 SBSQ HOSP IP/OBS HIGH 50: CPT

## 2023-01-01 PROCEDURE — 77307 TELETHX ISODOSE PLAN CPLX: CPT | Mod: 26

## 2023-01-01 PROCEDURE — 87070 CULTURE OTHR SPECIMN AEROBIC: CPT

## 2023-01-01 PROCEDURE — 99223 1ST HOSP IP/OBS HIGH 75: CPT

## 2023-01-01 PROCEDURE — 97535 SELF CARE MNGMENT TRAINING: CPT

## 2023-01-01 PROCEDURE — 74177 CT ABD & PELVIS W/CONTRAST: CPT | Mod: 26,MA

## 2023-01-01 PROCEDURE — 99285 EMERGENCY DEPT VISIT HI MDM: CPT | Mod: 25

## 2023-01-01 PROCEDURE — 99233 SBSQ HOSP IP/OBS HIGH 50: CPT | Mod: GC

## 2023-01-01 PROCEDURE — 88360 TUMOR IMMUNOHISTOCHEM/MANUAL: CPT

## 2023-01-01 PROCEDURE — 77262 THER RADIOLOGY TX PLNG INTRM: CPT

## 2023-01-01 PROCEDURE — 77334 RADIATION TREATMENT AID(S): CPT | Mod: 26

## 2023-01-01 PROCEDURE — 80307 DRUG TEST PRSMV CHEM ANLYZR: CPT

## 2023-01-01 PROCEDURE — 31645 BRNCHSC W/THER ASPIR 1ST: CPT | Mod: GC

## 2023-01-01 PROCEDURE — 76514 ECHO EXAM OF EYE THICKNESS: CPT

## 2023-01-01 PROCEDURE — 86850 RBC ANTIBODY SCREEN: CPT

## 2023-01-01 PROCEDURE — 97165 OT EVAL LOW COMPLEX 30 MIN: CPT

## 2023-01-01 PROCEDURE — 99222 1ST HOSP IP/OBS MODERATE 55: CPT

## 2023-01-01 PROCEDURE — 97112 NEUROMUSCULAR REEDUCATION: CPT

## 2023-01-01 PROCEDURE — 99443: CPT

## 2023-01-01 PROCEDURE — 99497 ADVNCD CARE PLAN 30 MIN: CPT | Mod: 25

## 2023-01-01 PROCEDURE — 82962 GLUCOSE BLOOD TEST: CPT

## 2023-01-01 PROCEDURE — 99232 SBSQ HOSP IP/OBS MODERATE 35: CPT | Mod: 25

## 2023-01-01 PROCEDURE — 99239 HOSP IP/OBS DSCHRG MGMT >30: CPT | Mod: GC

## 2023-01-01 PROCEDURE — 88342 IMHCHEM/IMCYTCHM 1ST ANTB: CPT | Mod: 26

## 2023-01-01 PROCEDURE — 88172 CYTP DX EVAL FNA 1ST EA SITE: CPT

## 2023-01-01 PROCEDURE — 84100 ASSAY OF PHOSPHORUS: CPT

## 2023-01-01 PROCEDURE — 88173 CYTOPATH EVAL FNA REPORT: CPT | Mod: 26

## 2023-01-01 PROCEDURE — 94640 AIRWAY INHALATION TREATMENT: CPT

## 2023-01-01 PROCEDURE — 86900 BLOOD TYPING SEROLOGIC ABO: CPT

## 2023-01-01 PROCEDURE — 70450 CT HEAD/BRAIN W/O DYE: CPT | Mod: 26,MA

## 2023-01-01 PROCEDURE — 92133 CPTRZD OPH DX IMG PST SGM ON: CPT

## 2023-01-01 PROCEDURE — 88341 IMHCHEM/IMCYTCHM EA ADD ANTB: CPT

## 2023-01-01 PROCEDURE — 99497 ADVNCD CARE PLAN 30 MIN: CPT

## 2023-01-01 PROCEDURE — 86901 BLOOD TYPING SEROLOGIC RH(D): CPT

## 2023-01-01 PROCEDURE — 80053 COMPREHEN METABOLIC PANEL: CPT

## 2023-01-01 PROCEDURE — 88305 TISSUE EXAM BY PATHOLOGIST: CPT | Mod: 26

## 2023-01-01 PROCEDURE — 88360 TUMOR IMMUNOHISTOCHEM/MANUAL: CPT | Mod: 26,59

## 2023-01-01 PROCEDURE — 87640 STAPH A DNA AMP PROBE: CPT

## 2023-01-01 PROCEDURE — 99239 HOSP IP/OBS DSCHRG MGMT >30: CPT

## 2023-01-01 PROCEDURE — 85610 PROTHROMBIN TIME: CPT

## 2023-01-01 PROCEDURE — 92004 COMPRE OPH EXAM NEW PT 1/>: CPT

## 2023-01-01 PROCEDURE — 99215 OFFICE O/P EST HI 40 MIN: CPT

## 2023-01-01 PROCEDURE — 85025 COMPLETE CBC W/AUTO DIFF WBC: CPT

## 2023-01-01 PROCEDURE — G0439: CPT

## 2023-01-01 PROCEDURE — 71045 X-RAY EXAM CHEST 1 VIEW: CPT | Mod: 26

## 2023-01-01 PROCEDURE — 88341 IMHCHEM/IMCYTCHM EA ADD ANTB: CPT | Mod: 26

## 2023-01-01 PROCEDURE — 87641 MR-STAPH DNA AMP PROBE: CPT

## 2023-01-01 PROCEDURE — 99285 EMERGENCY DEPT VISIT HI MDM: CPT

## 2023-01-01 PROCEDURE — 99222 1ST HOSP IP/OBS MODERATE 55: CPT | Mod: GC

## 2023-01-01 PROCEDURE — 77290 THER RAD SIMULAJ FIELD CPLX: CPT | Mod: 26

## 2023-01-01 PROCEDURE — 97129 THER IVNTJ 1ST 15 MIN: CPT

## 2023-01-01 PROCEDURE — 70553 MRI BRAIN STEM W/O & W/DYE: CPT | Mod: MA

## 2023-01-01 PROCEDURE — 99233 SBSQ HOSP IP/OBS HIGH 50: CPT | Mod: GC,25

## 2023-01-01 PROCEDURE — 71260 CT THORAX DX C+: CPT | Mod: 26,MA

## 2023-01-01 PROCEDURE — 85730 THROMBOPLASTIN TIME PARTIAL: CPT

## 2023-01-01 PROCEDURE — 99223 1ST HOSP IP/OBS HIGH 75: CPT | Mod: GC

## 2023-01-01 PROCEDURE — 36415 COLL VENOUS BLD VENIPUNCTURE: CPT

## 2023-01-01 PROCEDURE — 88342 IMHCHEM/IMCYTCHM 1ST ANTB: CPT

## 2023-01-01 PROCEDURE — 31624 DX BRONCHOSCOPE/LAVAGE: CPT | Mod: GC

## 2023-01-01 PROCEDURE — 70498 CT ANGIOGRAPHY NECK: CPT | Mod: 26,MA

## 2023-01-01 PROCEDURE — 12345: CPT | Mod: NC

## 2023-01-01 PROCEDURE — 88305 TISSUE EXAM BY PATHOLOGIST: CPT

## 2023-01-01 PROCEDURE — 31652 BRONCH EBUS SAMPLNG 1/2 NODE: CPT | Mod: GC

## 2023-01-01 PROCEDURE — U0003: CPT

## 2023-01-01 PROCEDURE — 83735 ASSAY OF MAGNESIUM: CPT

## 2023-01-01 PROCEDURE — 70496 CT ANGIOGRAPHY HEAD: CPT | Mod: 26,MA

## 2023-01-01 PROCEDURE — 70553 MRI BRAIN STEM W/O & W/DYE: CPT | Mod: 26

## 2023-01-01 PROCEDURE — 77427 RADIATION TX MANAGEMENT X5: CPT

## 2023-01-01 PROCEDURE — 74177 CT ABD & PELVIS W/CONTRAST: CPT | Mod: MA

## 2023-01-01 PROCEDURE — 0042T: CPT

## 2023-01-01 PROCEDURE — A9585: CPT

## 2023-01-01 PROCEDURE — 97161 PT EVAL LOW COMPLEX 20 MIN: CPT

## 2023-01-01 DEVICE — PACK THORACENTESIS CHG: Type: IMPLANTABLE DEVICE | Status: FUNCTIONAL

## 2023-01-01 RX ORDER — DEXAMETHASONE 0.5 MG/5ML
10 ELIXIR ORAL ONCE
Refills: 0 | Status: COMPLETED | OUTPATIENT
Start: 2023-01-01 | End: 2023-01-01

## 2023-01-01 RX ORDER — FOLIC ACID 0.8 MG
1 TABLET ORAL
Qty: 0 | Refills: 0 | DISCHARGE
Start: 2023-01-01

## 2023-01-01 RX ORDER — NICOTINE 21-14-7MG
21-14-7 KIT TRANSDERMAL
Refills: 0 | Status: ACTIVE | COMMUNITY

## 2023-01-01 RX ORDER — BUDESONIDE AND FORMOTEROL FUMARATE DIHYDRATE 160; 4.5 UG/1; UG/1
2 AEROSOL RESPIRATORY (INHALATION)
Qty: 1 | Refills: 0
Start: 2023-01-01 | End: 2023-01-01

## 2023-01-01 RX ORDER — POTASSIUM CHLORIDE 20 MEQ
20 PACKET (EA) ORAL ONCE
Refills: 0 | Status: COMPLETED | OUTPATIENT
Start: 2023-01-01 | End: 2023-01-01

## 2023-01-01 RX ORDER — FOLIC ACID 0.8 MG
1 TABLET ORAL DAILY
Refills: 0 | Status: DISCONTINUED | OUTPATIENT
Start: 2023-01-01 | End: 2023-01-01

## 2023-01-01 RX ORDER — DEXAMETHASONE 0.5 MG/5ML
1 ELIXIR ORAL
Qty: 0 | Refills: 0 | DISCHARGE
Start: 2023-01-01

## 2023-01-01 RX ORDER — NICOTINE POLACRILEX 2 MG
1 GUM BUCCAL
Qty: 2 | Refills: 0
Start: 2023-01-01 | End: 2023-01-01

## 2023-01-01 RX ORDER — THIAMINE MONONITRATE (VIT B1) 100 MG
500 TABLET ORAL DAILY
Refills: 0 | Status: COMPLETED | OUTPATIENT
Start: 2023-01-01 | End: 2023-01-01

## 2023-01-01 RX ORDER — MULTIVIT-MIN/FERROUS GLUCONATE 9 MG/15 ML
1 LIQUID (ML) ORAL
Refills: 0 | DISCHARGE

## 2023-01-01 RX ORDER — DEXAMETHASONE 0.5 MG/5ML
2 ELIXIR ORAL ONCE
Refills: 0 | Status: COMPLETED | OUTPATIENT
Start: 2023-01-01 | End: 2023-01-01

## 2023-01-01 RX ORDER — FOLIC ACID 0.8 MG
1 TABLET ORAL
Qty: 30 | Refills: 0
Start: 2023-01-01 | End: 2023-01-01

## 2023-01-01 RX ORDER — LEVETIRACETAM 250 MG/1
500 TABLET, FILM COATED ORAL
Refills: 0 | Status: DISCONTINUED | OUTPATIENT
Start: 2023-01-01 | End: 2023-01-01

## 2023-01-01 RX ORDER — DEXAMETHASONE 0.5 MG/5ML
4 ELIXIR ORAL EVERY 8 HOURS
Refills: 0 | Status: DISCONTINUED | OUTPATIENT
Start: 2023-01-01 | End: 2023-01-01

## 2023-01-01 RX ORDER — PANTOPRAZOLE SODIUM 20 MG/1
1 TABLET, DELAYED RELEASE ORAL
Qty: 0 | Refills: 0 | DISCHARGE
Start: 2023-01-01

## 2023-01-01 RX ORDER — DEXAMETHASONE 0.5 MG/5ML
2 ELIXIR ORAL EVERY 8 HOURS
Refills: 0 | Status: DISCONTINUED | OUTPATIENT
Start: 2023-01-01 | End: 2023-01-01

## 2023-01-01 RX ORDER — NICOTINE POLACRILEX 2 MG
1 GUM BUCCAL DAILY
Refills: 0 | Status: DISCONTINUED | OUTPATIENT
Start: 2023-01-01 | End: 2023-01-01

## 2023-01-01 RX ORDER — BUDESONIDE AND FORMOTEROL FUMARATE DIHYDRATE 80; 4.5 UG/1; UG/1
80-4.5 AEROSOL RESPIRATORY (INHALATION)
Qty: 10 | Refills: 0 | Status: ACTIVE | COMMUNITY
Start: 2023-01-01

## 2023-01-01 RX ORDER — PROCHLORPERAZINE MALEATE 10 MG/1
10 TABLET ORAL EVERY 6 HOURS
Qty: 24 | Refills: 2 | Status: ACTIVE | COMMUNITY
Start: 2023-01-01 | End: 1900-01-01

## 2023-01-01 RX ORDER — PANTOPRAZOLE SODIUM 20 MG/1
1 TABLET, DELAYED RELEASE ORAL
Qty: 30 | Refills: 0
Start: 2023-01-01 | End: 2023-01-01

## 2023-01-01 RX ORDER — BUDESONIDE AND FORMOTEROL FUMARATE DIHYDRATE 160; 4.5 UG/1; UG/1
2 AEROSOL RESPIRATORY (INHALATION)
Qty: 2 | Refills: 0
Start: 2023-01-01 | End: 2023-01-01

## 2023-01-01 RX ORDER — MUPIROCIN 20 MG/G
1 OINTMENT TOPICAL
Refills: 0 | Status: COMPLETED | OUTPATIENT
Start: 2023-01-01 | End: 2023-01-01

## 2023-01-01 RX ORDER — LANOLIN ALCOHOL/MO/W.PET/CERES
3 CREAM (GRAM) TOPICAL AT BEDTIME
Refills: 0 | Status: DISCONTINUED | OUTPATIENT
Start: 2023-01-01 | End: 2023-01-01

## 2023-01-01 RX ORDER — SODIUM CHLORIDE 9 MG/ML
1000 INJECTION, SOLUTION INTRAVENOUS
Refills: 0 | Status: DISCONTINUED | OUTPATIENT
Start: 2023-01-01 | End: 2023-01-01

## 2023-01-01 RX ORDER — LEVETIRACETAM 1000 MG/1
TABLET, FILM COATED ORAL
Refills: 0 | Status: ACTIVE | COMMUNITY

## 2023-01-01 RX ORDER — DEXAMETHASONE 6 MG/1
TABLET ORAL
Refills: 0 | Status: ACTIVE | COMMUNITY

## 2023-01-01 RX ORDER — CHLORHEXIDINE GLUCONATE 213 G/1000ML
1 SOLUTION TOPICAL DAILY
Refills: 0 | Status: DISCONTINUED | OUTPATIENT
Start: 2023-01-01 | End: 2023-01-01

## 2023-01-01 RX ORDER — FOLIC ACID 1 MG/1
1 TABLET ORAL
Qty: 30 | Refills: 0 | Status: ACTIVE | COMMUNITY
Start: 2023-01-01

## 2023-01-01 RX ORDER — UMECLIDINIUM 62.5 UG/1
62.5 AEROSOL, POWDER ORAL
Qty: 30 | Refills: 0 | Status: ACTIVE | COMMUNITY
Start: 2023-01-01

## 2023-01-01 RX ORDER — LEVETIRACETAM 250 MG/1
1 TABLET, FILM COATED ORAL
Qty: 0 | Refills: 0 | DISCHARGE
Start: 2023-01-01

## 2023-01-01 RX ORDER — TIOTROPIUM BROMIDE 18 UG/1
2 CAPSULE ORAL; RESPIRATORY (INHALATION)
Qty: 1 | Refills: 0
Start: 2023-01-01 | End: 2023-01-01

## 2023-01-01 RX ORDER — SODIUM,POTASSIUM PHOSPHATES 278-250MG
1 POWDER IN PACKET (EA) ORAL THREE TIMES A DAY
Refills: 0 | Status: COMPLETED | OUTPATIENT
Start: 2023-01-01 | End: 2023-01-01

## 2023-01-01 RX ORDER — DEXAMETHASONE 0.5 MG/5ML
1 ELIXIR ORAL
Qty: 36 | Refills: 0
Start: 2023-01-01 | End: 2023-01-01

## 2023-01-01 RX ORDER — BUDESONIDE AND FORMOTEROL FUMARATE DIHYDRATE 160; 4.5 UG/1; UG/1
2 AEROSOL RESPIRATORY (INHALATION)
Qty: 0 | Refills: 0 | DISCHARGE
Start: 2023-01-01

## 2023-01-01 RX ORDER — PANTOPRAZOLE SODIUM 20 MG/1
40 TABLET, DELAYED RELEASE ORAL
Refills: 0 | Status: DISCONTINUED | OUTPATIENT
Start: 2023-01-01 | End: 2023-01-01

## 2023-01-01 RX ORDER — TIOTROPIUM BROMIDE 18 UG/1
2 CAPSULE ORAL; RESPIRATORY (INHALATION)
Qty: 0 | Refills: 0 | DISCHARGE
Start: 2023-01-01

## 2023-01-01 RX ORDER — DEXAMETHASONE 0.5 MG/5ML
4 ELIXIR ORAL
Refills: 0 | Status: DISCONTINUED | OUTPATIENT
Start: 2023-01-01 | End: 2023-01-01

## 2023-01-01 RX ORDER — TIOTROPIUM BROMIDE 18 UG/1
2 CAPSULE ORAL; RESPIRATORY (INHALATION) DAILY
Refills: 0 | Status: DISCONTINUED | OUTPATIENT
Start: 2023-01-01 | End: 2023-01-01

## 2023-01-01 RX ORDER — LEVETIRACETAM 250 MG/1
1 TABLET, FILM COATED ORAL
Qty: 60 | Refills: 3
Start: 2023-01-01 | End: 2023-09-08

## 2023-01-01 RX ORDER — ACETAMINOPHEN 500 MG
650 TABLET ORAL EVERY 6 HOURS
Refills: 0 | Status: DISCONTINUED | OUTPATIENT
Start: 2023-01-01 | End: 2023-01-01

## 2023-01-01 RX ORDER — TIOTROPIUM BROMIDE 18 UG/1
2 CAPSULE ORAL; RESPIRATORY (INHALATION)
Qty: 2 | Refills: 0
Start: 2023-01-01 | End: 2023-01-01

## 2023-01-01 RX ORDER — BUDESONIDE AND FORMOTEROL FUMARATE DIHYDRATE 160; 4.5 UG/1; UG/1
2 AEROSOL RESPIRATORY (INHALATION)
Refills: 0 | Status: DISCONTINUED | OUTPATIENT
Start: 2023-01-01 | End: 2023-01-01

## 2023-01-01 RX ORDER — ACETAMINOPHEN 500 MG
2 TABLET ORAL
Qty: 0 | Refills: 0 | DISCHARGE
Start: 2023-01-01

## 2023-01-01 RX ORDER — NICOTINE POLACRILEX 2 MG
1 GUM BUCCAL
Qty: 0 | Refills: 0 | DISCHARGE
Start: 2023-01-01

## 2023-01-01 RX ORDER — DEXAMETHASONE 0.5 MG/5ML
4 ELIXIR ORAL EVERY 6 HOURS
Refills: 0 | Status: DISCONTINUED | OUTPATIENT
Start: 2023-01-01 | End: 2023-01-01

## 2023-01-01 RX ORDER — HEPARIN SODIUM 5000 [USP'U]/ML
5000 INJECTION INTRAVENOUS; SUBCUTANEOUS EVERY 8 HOURS
Refills: 0 | Status: DISCONTINUED | OUTPATIENT
Start: 2023-01-01 | End: 2023-01-01

## 2023-01-01 RX ORDER — FOLIC ACID 1 MG/1
1 TABLET ORAL DAILY
Qty: 90 | Refills: 1 | Status: ACTIVE | COMMUNITY
Start: 2023-01-01 | End: 1900-01-01

## 2023-01-01 RX ORDER — PANTOPRAZOLE 40 MG/1
40 TABLET, DELAYED RELEASE ORAL
Qty: 30 | Refills: 0 | Status: ACTIVE | COMMUNITY
Start: 2023-01-01

## 2023-01-01 RX ORDER — DEXAMETHASONE 0.5 MG/5ML
4 ELIXIR ORAL EVERY 12 HOURS
Refills: 0 | Status: DISCONTINUED | OUTPATIENT
Start: 2023-01-01 | End: 2023-01-01

## 2023-01-01 RX ORDER — ONDANSETRON 8 MG/1
8 TABLET, ORALLY DISINTEGRATING ORAL EVERY 8 HOURS
Qty: 24 | Refills: 2 | Status: ACTIVE | COMMUNITY
Start: 2023-01-01 | End: 1900-01-01

## 2023-01-01 RX ORDER — LEVETIRACETAM 250 MG/1
1 TABLET, FILM COATED ORAL
Qty: 60 | Refills: 3
Start: 2023-01-01 | End: 2023-09-14

## 2023-01-01 RX ORDER — ONDANSETRON 8 MG/1
4 TABLET, FILM COATED ORAL EVERY 8 HOURS
Refills: 0 | Status: DISCONTINUED | OUTPATIENT
Start: 2023-01-01 | End: 2023-01-01

## 2023-01-01 RX ADMIN — LEVETIRACETAM 500 MILLIGRAM(S): 250 TABLET, FILM COATED ORAL at 06:46

## 2023-01-01 RX ADMIN — LEVETIRACETAM 500 MILLIGRAM(S): 250 TABLET, FILM COATED ORAL at 17:54

## 2023-01-01 RX ADMIN — Medication 4 MILLIGRAM(S): at 05:15

## 2023-01-01 RX ADMIN — Medication 4 MILLIGRAM(S): at 17:43

## 2023-01-01 RX ADMIN — LEVETIRACETAM 500 MILLIGRAM(S): 250 TABLET, FILM COATED ORAL at 05:43

## 2023-01-01 RX ADMIN — Medication 1 MILLIGRAM(S): at 11:59

## 2023-01-01 RX ADMIN — Medication 4 MILLIGRAM(S): at 21:43

## 2023-01-01 RX ADMIN — Medication 1 PATCH: at 18:05

## 2023-01-01 RX ADMIN — BUDESONIDE AND FORMOTEROL FUMARATE DIHYDRATE 2 PUFF(S): 160; 4.5 AEROSOL RESPIRATORY (INHALATION) at 23:21

## 2023-01-01 RX ADMIN — Medication 4 MILLIGRAM(S): at 13:02

## 2023-01-01 RX ADMIN — MUPIROCIN 1 APPLICATION(S): 20 OINTMENT TOPICAL at 07:18

## 2023-01-01 RX ADMIN — CHLORHEXIDINE GLUCONATE 1 APPLICATION(S): 213 SOLUTION TOPICAL at 12:36

## 2023-01-01 RX ADMIN — CHLORHEXIDINE GLUCONATE 1 APPLICATION(S): 213 SOLUTION TOPICAL at 17:55

## 2023-01-01 RX ADMIN — BUDESONIDE AND FORMOTEROL FUMARATE DIHYDRATE 2 PUFF(S): 160; 4.5 AEROSOL RESPIRATORY (INHALATION) at 17:38

## 2023-01-01 RX ADMIN — Medication 4 MILLIGRAM(S): at 12:30

## 2023-01-01 RX ADMIN — Medication 4 MILLIGRAM(S): at 04:04

## 2023-01-01 RX ADMIN — LEVETIRACETAM 500 MILLIGRAM(S): 250 TABLET, FILM COATED ORAL at 17:20

## 2023-01-01 RX ADMIN — Medication 1 TABLET(S): at 12:26

## 2023-01-01 RX ADMIN — Medication 1 PATCH: at 07:00

## 2023-01-01 RX ADMIN — CHLORHEXIDINE GLUCONATE 1 APPLICATION(S): 213 SOLUTION TOPICAL at 11:26

## 2023-01-01 RX ADMIN — Medication 1 TABLET(S): at 12:58

## 2023-01-01 RX ADMIN — Medication 4 MILLIGRAM(S): at 06:03

## 2023-01-01 RX ADMIN — Medication 2 MILLIGRAM(S): at 06:46

## 2023-01-01 RX ADMIN — CHLORHEXIDINE GLUCONATE 1 APPLICATION(S): 213 SOLUTION TOPICAL at 12:40

## 2023-01-01 RX ADMIN — Medication 1 MILLIGRAM(S): at 12:54

## 2023-01-01 RX ADMIN — LEVETIRACETAM 500 MILLIGRAM(S): 250 TABLET, FILM COATED ORAL at 05:18

## 2023-01-01 RX ADMIN — Medication 1 PATCH: at 18:15

## 2023-01-01 RX ADMIN — LEVETIRACETAM 500 MILLIGRAM(S): 250 TABLET, FILM COATED ORAL at 04:04

## 2023-01-01 RX ADMIN — LEVETIRACETAM 500 MILLIGRAM(S): 250 TABLET, FILM COATED ORAL at 18:46

## 2023-01-01 RX ADMIN — LEVETIRACETAM 500 MILLIGRAM(S): 250 TABLET, FILM COATED ORAL at 05:21

## 2023-01-01 RX ADMIN — LEVETIRACETAM 500 MILLIGRAM(S): 250 TABLET, FILM COATED ORAL at 17:55

## 2023-01-01 RX ADMIN — Medication 105 MILLIGRAM(S): at 12:57

## 2023-01-01 RX ADMIN — LEVETIRACETAM 500 MILLIGRAM(S): 250 TABLET, FILM COATED ORAL at 05:58

## 2023-01-01 RX ADMIN — LEVETIRACETAM 500 MILLIGRAM(S): 250 TABLET, FILM COATED ORAL at 07:21

## 2023-01-01 RX ADMIN — Medication 1 TABLET(S): at 12:28

## 2023-01-01 RX ADMIN — BUDESONIDE AND FORMOTEROL FUMARATE DIHYDRATE 2 PUFF(S): 160; 4.5 AEROSOL RESPIRATORY (INHALATION) at 21:09

## 2023-01-01 RX ADMIN — Medication 1 MILLIGRAM(S): at 13:39

## 2023-01-01 RX ADMIN — Medication 1 PATCH: at 07:20

## 2023-01-01 RX ADMIN — BUDESONIDE AND FORMOTEROL FUMARATE DIHYDRATE 2 PUFF(S): 160; 4.5 AEROSOL RESPIRATORY (INHALATION) at 18:11

## 2023-01-01 RX ADMIN — BUDESONIDE AND FORMOTEROL FUMARATE DIHYDRATE 2 PUFF(S): 160; 4.5 AEROSOL RESPIRATORY (INHALATION) at 06:34

## 2023-01-01 RX ADMIN — Medication 1 PATCH: at 12:54

## 2023-01-01 RX ADMIN — PANTOPRAZOLE SODIUM 40 MILLIGRAM(S): 20 TABLET, DELAYED RELEASE ORAL at 06:30

## 2023-01-01 RX ADMIN — CHLORHEXIDINE GLUCONATE 1 APPLICATION(S): 213 SOLUTION TOPICAL at 11:48

## 2023-01-01 RX ADMIN — Medication 1 TABLET(S): at 11:50

## 2023-01-01 RX ADMIN — Medication 4 MILLIGRAM(S): at 18:10

## 2023-01-01 RX ADMIN — Medication 2 MILLIGRAM(S): at 13:10

## 2023-01-01 RX ADMIN — BUDESONIDE AND FORMOTEROL FUMARATE DIHYDRATE 2 PUFF(S): 160; 4.5 AEROSOL RESPIRATORY (INHALATION) at 21:43

## 2023-01-01 RX ADMIN — LEVETIRACETAM 500 MILLIGRAM(S): 250 TABLET, FILM COATED ORAL at 06:09

## 2023-01-01 RX ADMIN — CHLORHEXIDINE GLUCONATE 1 APPLICATION(S): 213 SOLUTION TOPICAL at 13:41

## 2023-01-01 RX ADMIN — Medication 105 MILLIGRAM(S): at 12:25

## 2023-01-01 RX ADMIN — Medication 1 PATCH: at 13:09

## 2023-01-01 RX ADMIN — Medication 2 MILLIGRAM(S): at 06:16

## 2023-01-01 RX ADMIN — Medication 1 PATCH: at 12:58

## 2023-01-01 RX ADMIN — Medication 4 MILLIGRAM(S): at 18:11

## 2023-01-01 RX ADMIN — Medication 1 PATCH: at 20:19

## 2023-01-01 RX ADMIN — Medication 4 MILLIGRAM(S): at 21:23

## 2023-01-01 RX ADMIN — PANTOPRAZOLE SODIUM 40 MILLIGRAM(S): 20 TABLET, DELAYED RELEASE ORAL at 06:46

## 2023-01-01 RX ADMIN — Medication 4 MILLIGRAM(S): at 17:54

## 2023-01-01 RX ADMIN — Medication 1 MILLIGRAM(S): at 11:47

## 2023-01-01 RX ADMIN — Medication 1 PATCH: at 12:53

## 2023-01-01 RX ADMIN — TIOTROPIUM BROMIDE 2 PUFF(S): 18 CAPSULE ORAL; RESPIRATORY (INHALATION) at 14:00

## 2023-01-01 RX ADMIN — Medication 1 PATCH: at 20:00

## 2023-01-01 RX ADMIN — LEVETIRACETAM 500 MILLIGRAM(S): 250 TABLET, FILM COATED ORAL at 17:21

## 2023-01-01 RX ADMIN — Medication 1 PATCH: at 07:06

## 2023-01-01 RX ADMIN — LEVETIRACETAM 500 MILLIGRAM(S): 250 TABLET, FILM COATED ORAL at 05:28

## 2023-01-01 RX ADMIN — BUDESONIDE AND FORMOTEROL FUMARATE DIHYDRATE 2 PUFF(S): 160; 4.5 AEROSOL RESPIRATORY (INHALATION) at 17:42

## 2023-01-01 RX ADMIN — BUDESONIDE AND FORMOTEROL FUMARATE DIHYDRATE 2 PUFF(S): 160; 4.5 AEROSOL RESPIRATORY (INHALATION) at 05:51

## 2023-01-01 RX ADMIN — Medication 1 PATCH: at 12:31

## 2023-01-01 RX ADMIN — Medication 1 MILLIGRAM(S): at 13:27

## 2023-01-01 RX ADMIN — CHLORHEXIDINE GLUCONATE 1 APPLICATION(S): 213 SOLUTION TOPICAL at 12:17

## 2023-01-01 RX ADMIN — LEVETIRACETAM 500 MILLIGRAM(S): 250 TABLET, FILM COATED ORAL at 17:44

## 2023-01-01 RX ADMIN — BUDESONIDE AND FORMOTEROL FUMARATE DIHYDRATE 2 PUFF(S): 160; 4.5 AEROSOL RESPIRATORY (INHALATION) at 05:59

## 2023-01-01 RX ADMIN — TIOTROPIUM BROMIDE 2 PUFF(S): 18 CAPSULE ORAL; RESPIRATORY (INHALATION) at 13:41

## 2023-01-01 RX ADMIN — Medication 1 PATCH: at 06:39

## 2023-01-01 RX ADMIN — Medication 1 TABLET(S): at 11:09

## 2023-01-01 RX ADMIN — Medication 1 TABLET(S): at 13:40

## 2023-01-01 RX ADMIN — LEVETIRACETAM 500 MILLIGRAM(S): 250 TABLET, FILM COATED ORAL at 07:20

## 2023-01-01 RX ADMIN — LEVETIRACETAM 500 MILLIGRAM(S): 250 TABLET, FILM COATED ORAL at 05:32

## 2023-01-01 RX ADMIN — Medication 4 MILLIGRAM(S): at 17:53

## 2023-01-01 RX ADMIN — PANTOPRAZOLE SODIUM 40 MILLIGRAM(S): 20 TABLET, DELAYED RELEASE ORAL at 13:40

## 2023-01-01 RX ADMIN — Medication 4 MILLIGRAM(S): at 00:25

## 2023-01-01 RX ADMIN — MUPIROCIN 1 APPLICATION(S): 20 OINTMENT TOPICAL at 18:47

## 2023-01-01 RX ADMIN — Medication 1 PATCH: at 11:42

## 2023-01-01 RX ADMIN — CHLORHEXIDINE GLUCONATE 1 APPLICATION(S): 213 SOLUTION TOPICAL at 13:25

## 2023-01-01 RX ADMIN — Medication 1 MILLIGRAM(S): at 11:48

## 2023-01-01 RX ADMIN — TIOTROPIUM BROMIDE 2 PUFF(S): 18 CAPSULE ORAL; RESPIRATORY (INHALATION) at 11:27

## 2023-01-01 RX ADMIN — Medication 2 MILLIGRAM(S): at 06:30

## 2023-01-01 RX ADMIN — LEVETIRACETAM 500 MILLIGRAM(S): 250 TABLET, FILM COATED ORAL at 05:40

## 2023-01-01 RX ADMIN — Medication 1 MILLIGRAM(S): at 12:28

## 2023-01-01 RX ADMIN — Medication 4 MILLIGRAM(S): at 17:48

## 2023-01-01 RX ADMIN — MUPIROCIN 1 APPLICATION(S): 20 OINTMENT TOPICAL at 17:17

## 2023-01-01 RX ADMIN — LEVETIRACETAM 500 MILLIGRAM(S): 250 TABLET, FILM COATED ORAL at 05:50

## 2023-01-01 RX ADMIN — Medication 4 MILLIGRAM(S): at 12:28

## 2023-01-01 RX ADMIN — Medication 1 TABLET(S): at 12:54

## 2023-01-01 RX ADMIN — LEVETIRACETAM 500 MILLIGRAM(S): 250 TABLET, FILM COATED ORAL at 05:34

## 2023-01-01 RX ADMIN — Medication 4 MILLIGRAM(S): at 05:59

## 2023-01-01 RX ADMIN — PANTOPRAZOLE SODIUM 40 MILLIGRAM(S): 20 TABLET, DELAYED RELEASE ORAL at 07:20

## 2023-01-01 RX ADMIN — Medication 1 TABLET(S): at 12:01

## 2023-01-01 RX ADMIN — Medication 1 PATCH: at 20:33

## 2023-01-01 RX ADMIN — Medication 1 PATCH: at 06:45

## 2023-01-01 RX ADMIN — Medication 4 MILLIGRAM(S): at 05:50

## 2023-01-01 RX ADMIN — Medication 1 MILLIGRAM(S): at 12:50

## 2023-01-01 RX ADMIN — TIOTROPIUM BROMIDE 2 PUFF(S): 18 CAPSULE ORAL; RESPIRATORY (INHALATION) at 12:00

## 2023-01-01 RX ADMIN — LEVETIRACETAM 500 MILLIGRAM(S): 250 TABLET, FILM COATED ORAL at 17:36

## 2023-01-01 RX ADMIN — Medication 2 MILLIGRAM(S): at 14:32

## 2023-01-01 RX ADMIN — Medication 4 MILLIGRAM(S): at 05:28

## 2023-01-01 RX ADMIN — LEVETIRACETAM 500 MILLIGRAM(S): 250 TABLET, FILM COATED ORAL at 17:26

## 2023-01-01 RX ADMIN — Medication 1 PATCH: at 21:38

## 2023-01-01 RX ADMIN — LEVETIRACETAM 500 MILLIGRAM(S): 250 TABLET, FILM COATED ORAL at 18:25

## 2023-01-01 RX ADMIN — BUDESONIDE AND FORMOTEROL FUMARATE DIHYDRATE 2 PUFF(S): 160; 4.5 AEROSOL RESPIRATORY (INHALATION) at 08:12

## 2023-01-01 RX ADMIN — BUDESONIDE AND FORMOTEROL FUMARATE DIHYDRATE 2 PUFF(S): 160; 4.5 AEROSOL RESPIRATORY (INHALATION) at 17:46

## 2023-01-01 RX ADMIN — TIOTROPIUM BROMIDE 2 PUFF(S): 18 CAPSULE ORAL; RESPIRATORY (INHALATION) at 12:33

## 2023-01-01 RX ADMIN — Medication 4 MILLIGRAM(S): at 05:27

## 2023-01-01 RX ADMIN — CHLORHEXIDINE GLUCONATE 1 APPLICATION(S): 213 SOLUTION TOPICAL at 12:55

## 2023-01-01 RX ADMIN — Medication 1 PATCH: at 08:42

## 2023-01-01 RX ADMIN — Medication 1 PATCH: at 13:28

## 2023-01-01 RX ADMIN — LEVETIRACETAM 500 MILLIGRAM(S): 250 TABLET, FILM COATED ORAL at 18:27

## 2023-01-01 RX ADMIN — CHLORHEXIDINE GLUCONATE 1 APPLICATION(S): 213 SOLUTION TOPICAL at 12:26

## 2023-01-01 RX ADMIN — Medication 1 PATCH: at 12:45

## 2023-01-01 RX ADMIN — Medication 1 MILLIGRAM(S): at 14:00

## 2023-01-01 RX ADMIN — Medication 1 TABLET(S): at 11:24

## 2023-01-01 RX ADMIN — TIOTROPIUM BROMIDE 2 PUFF(S): 18 CAPSULE ORAL; RESPIRATORY (INHALATION) at 12:50

## 2023-01-01 RX ADMIN — Medication 1 PATCH: at 07:38

## 2023-01-01 RX ADMIN — BUDESONIDE AND FORMOTEROL FUMARATE DIHYDRATE 2 PUFF(S): 160; 4.5 AEROSOL RESPIRATORY (INHALATION) at 06:08

## 2023-01-01 RX ADMIN — Medication 4 MILLIGRAM(S): at 17:47

## 2023-01-01 RX ADMIN — Medication 1 MILLIGRAM(S): at 11:36

## 2023-01-01 RX ADMIN — MUPIROCIN 1 APPLICATION(S): 20 OINTMENT TOPICAL at 17:54

## 2023-01-01 RX ADMIN — Medication 1 MILLIGRAM(S): at 12:30

## 2023-01-01 RX ADMIN — Medication 1 PATCH: at 07:15

## 2023-01-01 RX ADMIN — Medication 2 MILLIGRAM(S): at 07:18

## 2023-01-01 RX ADMIN — LEVETIRACETAM 500 MILLIGRAM(S): 250 TABLET, FILM COATED ORAL at 17:50

## 2023-01-01 RX ADMIN — TIOTROPIUM BROMIDE 2 PUFF(S): 18 CAPSULE ORAL; RESPIRATORY (INHALATION) at 11:46

## 2023-01-01 RX ADMIN — TIOTROPIUM BROMIDE 2 PUFF(S): 18 CAPSULE ORAL; RESPIRATORY (INHALATION) at 12:26

## 2023-01-01 RX ADMIN — LEVETIRACETAM 500 MILLIGRAM(S): 250 TABLET, FILM COATED ORAL at 06:03

## 2023-01-01 RX ADMIN — Medication 1 MILLIGRAM(S): at 11:23

## 2023-01-01 RX ADMIN — Medication 1 TABLET(S): at 11:59

## 2023-01-01 RX ADMIN — BUDESONIDE AND FORMOTEROL FUMARATE DIHYDRATE 2 PUFF(S): 160; 4.5 AEROSOL RESPIRATORY (INHALATION) at 21:20

## 2023-01-01 RX ADMIN — Medication 1 TABLET(S): at 11:25

## 2023-01-01 RX ADMIN — CHLORHEXIDINE GLUCONATE 1 APPLICATION(S): 213 SOLUTION TOPICAL at 11:05

## 2023-01-01 RX ADMIN — Medication 4 MILLIGRAM(S): at 05:34

## 2023-01-01 RX ADMIN — LEVETIRACETAM 500 MILLIGRAM(S): 250 TABLET, FILM COATED ORAL at 05:29

## 2023-01-01 RX ADMIN — Medication 1 PATCH: at 16:01

## 2023-01-01 RX ADMIN — Medication 1 PATCH: at 14:34

## 2023-01-01 RX ADMIN — Medication 1 PATCH: at 12:00

## 2023-01-01 RX ADMIN — Medication 1 TABLET(S): at 11:41

## 2023-01-01 RX ADMIN — TIOTROPIUM BROMIDE 2 PUFF(S): 18 CAPSULE ORAL; RESPIRATORY (INHALATION) at 11:26

## 2023-01-01 RX ADMIN — Medication 1 PATCH: at 19:20

## 2023-01-01 RX ADMIN — TIOTROPIUM BROMIDE 2 PUFF(S): 18 CAPSULE ORAL; RESPIRATORY (INHALATION) at 09:06

## 2023-01-01 RX ADMIN — LEVETIRACETAM 500 MILLIGRAM(S): 250 TABLET, FILM COATED ORAL at 18:10

## 2023-01-01 RX ADMIN — MUPIROCIN 1 APPLICATION(S): 20 OINTMENT TOPICAL at 06:04

## 2023-01-01 RX ADMIN — Medication 1 PATCH: at 07:47

## 2023-01-01 RX ADMIN — Medication 1 PATCH: at 11:00

## 2023-01-01 RX ADMIN — Medication 1 PATCH: at 12:26

## 2023-01-01 RX ADMIN — Medication 4 MILLIGRAM(S): at 06:16

## 2023-01-01 RX ADMIN — BUDESONIDE AND FORMOTEROL FUMARATE DIHYDRATE 2 PUFF(S): 160; 4.5 AEROSOL RESPIRATORY (INHALATION) at 06:10

## 2023-01-01 RX ADMIN — Medication 4 MILLIGRAM(S): at 00:01

## 2023-01-01 RX ADMIN — LEVETIRACETAM 500 MILLIGRAM(S): 250 TABLET, FILM COATED ORAL at 05:15

## 2023-01-01 RX ADMIN — Medication 1 TABLET(S): at 12:49

## 2023-01-01 RX ADMIN — Medication 1 PATCH: at 05:30

## 2023-01-01 RX ADMIN — PANTOPRAZOLE SODIUM 40 MILLIGRAM(S): 20 TABLET, DELAYED RELEASE ORAL at 06:03

## 2023-01-01 RX ADMIN — LEVETIRACETAM 500 MILLIGRAM(S): 250 TABLET, FILM COATED ORAL at 17:28

## 2023-01-01 RX ADMIN — TIOTROPIUM BROMIDE 2 PUFF(S): 18 CAPSULE ORAL; RESPIRATORY (INHALATION) at 12:54

## 2023-01-01 RX ADMIN — BUDESONIDE AND FORMOTEROL FUMARATE DIHYDRATE 2 PUFF(S): 160; 4.5 AEROSOL RESPIRATORY (INHALATION) at 05:32

## 2023-01-01 RX ADMIN — Medication 1 MILLIGRAM(S): at 13:53

## 2023-01-01 RX ADMIN — BUDESONIDE AND FORMOTEROL FUMARATE DIHYDRATE 2 PUFF(S): 160; 4.5 AEROSOL RESPIRATORY (INHALATION) at 09:55

## 2023-01-01 RX ADMIN — LEVETIRACETAM 500 MILLIGRAM(S): 250 TABLET, FILM COATED ORAL at 06:02

## 2023-01-01 RX ADMIN — Medication 4 MILLIGRAM(S): at 05:19

## 2023-01-01 RX ADMIN — BUDESONIDE AND FORMOTEROL FUMARATE DIHYDRATE 2 PUFF(S): 160; 4.5 AEROSOL RESPIRATORY (INHALATION) at 06:16

## 2023-01-01 RX ADMIN — Medication 4 MILLIGRAM(S): at 22:30

## 2023-01-01 RX ADMIN — BUDESONIDE AND FORMOTEROL FUMARATE DIHYDRATE 2 PUFF(S): 160; 4.5 AEROSOL RESPIRATORY (INHALATION) at 18:40

## 2023-01-01 RX ADMIN — LEVETIRACETAM 500 MILLIGRAM(S): 250 TABLET, FILM COATED ORAL at 06:30

## 2023-01-01 RX ADMIN — MUPIROCIN 1 APPLICATION(S): 20 OINTMENT TOPICAL at 17:11

## 2023-01-01 RX ADMIN — Medication 4 MILLIGRAM(S): at 17:45

## 2023-01-01 RX ADMIN — Medication 1 PATCH: at 19:30

## 2023-01-01 RX ADMIN — Medication 4 MILLIGRAM(S): at 13:09

## 2023-01-01 RX ADMIN — BUDESONIDE AND FORMOTEROL FUMARATE DIHYDRATE 2 PUFF(S): 160; 4.5 AEROSOL RESPIRATORY (INHALATION) at 21:02

## 2023-01-01 RX ADMIN — Medication 2 MILLIGRAM(S): at 13:32

## 2023-01-01 RX ADMIN — Medication 1 PATCH: at 06:12

## 2023-01-01 RX ADMIN — Medication 1 MILLIGRAM(S): at 11:27

## 2023-01-01 RX ADMIN — LEVETIRACETAM 500 MILLIGRAM(S): 250 TABLET, FILM COATED ORAL at 06:26

## 2023-01-01 RX ADMIN — Medication 2 MILLIGRAM(S): at 21:08

## 2023-01-01 RX ADMIN — MUPIROCIN 1 APPLICATION(S): 20 OINTMENT TOPICAL at 06:30

## 2023-01-01 RX ADMIN — TIOTROPIUM BROMIDE 2 PUFF(S): 18 CAPSULE ORAL; RESPIRATORY (INHALATION) at 13:59

## 2023-01-01 RX ADMIN — LEVETIRACETAM 500 MILLIGRAM(S): 250 TABLET, FILM COATED ORAL at 06:06

## 2023-01-01 RX ADMIN — Medication 1 MILLIGRAM(S): at 13:23

## 2023-01-01 RX ADMIN — Medication 1 PATCH: at 12:27

## 2023-01-01 RX ADMIN — Medication 1 PATCH: at 11:28

## 2023-01-01 RX ADMIN — Medication 1 PATCH: at 11:07

## 2023-01-01 RX ADMIN — MUPIROCIN 1 APPLICATION(S): 20 OINTMENT TOPICAL at 18:10

## 2023-01-01 RX ADMIN — Medication 1 PATCH: at 12:30

## 2023-01-01 RX ADMIN — LEVETIRACETAM 500 MILLIGRAM(S): 250 TABLET, FILM COATED ORAL at 06:19

## 2023-01-01 RX ADMIN — Medication 4 MILLIGRAM(S): at 05:43

## 2023-01-01 RX ADMIN — Medication 4 MILLIGRAM(S): at 13:39

## 2023-01-01 RX ADMIN — Medication 1 PATCH: at 13:57

## 2023-01-01 RX ADMIN — Medication 1 TABLET(S): at 11:47

## 2023-01-01 RX ADMIN — PANTOPRAZOLE SODIUM 40 MILLIGRAM(S): 20 TABLET, DELAYED RELEASE ORAL at 05:41

## 2023-01-01 RX ADMIN — TIOTROPIUM BROMIDE 2 PUFF(S): 18 CAPSULE ORAL; RESPIRATORY (INHALATION) at 09:59

## 2023-01-01 RX ADMIN — Medication 1 PATCH: at 11:47

## 2023-01-01 RX ADMIN — LEVETIRACETAM 500 MILLIGRAM(S): 250 TABLET, FILM COATED ORAL at 17:10

## 2023-01-01 RX ADMIN — Medication 1 TABLET(S): at 13:58

## 2023-01-01 RX ADMIN — Medication 4 MILLIGRAM(S): at 18:28

## 2023-01-01 RX ADMIN — Medication 4 MILLIGRAM(S): at 18:02

## 2023-01-01 RX ADMIN — LEVETIRACETAM 500 MILLIGRAM(S): 250 TABLET, FILM COATED ORAL at 06:16

## 2023-01-01 RX ADMIN — CHLORHEXIDINE GLUCONATE 1 APPLICATION(S): 213 SOLUTION TOPICAL at 17:44

## 2023-01-01 RX ADMIN — Medication 1 PATCH: at 11:35

## 2023-01-01 RX ADMIN — BUDESONIDE AND FORMOTEROL FUMARATE DIHYDRATE 2 PUFF(S): 160; 4.5 AEROSOL RESPIRATORY (INHALATION) at 18:12

## 2023-01-01 RX ADMIN — LEVETIRACETAM 500 MILLIGRAM(S): 250 TABLET, FILM COATED ORAL at 17:47

## 2023-01-01 RX ADMIN — BUDESONIDE AND FORMOTEROL FUMARATE DIHYDRATE 2 PUFF(S): 160; 4.5 AEROSOL RESPIRATORY (INHALATION) at 06:26

## 2023-01-01 RX ADMIN — TIOTROPIUM BROMIDE 2 PUFF(S): 18 CAPSULE ORAL; RESPIRATORY (INHALATION) at 11:57

## 2023-01-01 RX ADMIN — LEVETIRACETAM 500 MILLIGRAM(S): 250 TABLET, FILM COATED ORAL at 17:02

## 2023-01-01 RX ADMIN — Medication 2 MILLIGRAM(S): at 14:14

## 2023-01-01 RX ADMIN — Medication 4 MILLIGRAM(S): at 05:21

## 2023-01-01 RX ADMIN — BUDESONIDE AND FORMOTEROL FUMARATE DIHYDRATE 2 PUFF(S): 160; 4.5 AEROSOL RESPIRATORY (INHALATION) at 20:51

## 2023-01-01 RX ADMIN — BUDESONIDE AND FORMOTEROL FUMARATE DIHYDRATE 2 PUFF(S): 160; 4.5 AEROSOL RESPIRATORY (INHALATION) at 17:10

## 2023-01-01 RX ADMIN — BUDESONIDE AND FORMOTEROL FUMARATE DIHYDRATE 2 PUFF(S): 160; 4.5 AEROSOL RESPIRATORY (INHALATION) at 05:19

## 2023-01-01 RX ADMIN — Medication 1 MILLIGRAM(S): at 12:45

## 2023-01-01 RX ADMIN — BUDESONIDE AND FORMOTEROL FUMARATE DIHYDRATE 2 PUFF(S): 160; 4.5 AEROSOL RESPIRATORY (INHALATION) at 17:47

## 2023-01-01 RX ADMIN — Medication 1 TABLET(S): at 16:57

## 2023-01-01 RX ADMIN — Medication 10 MILLIGRAM(S): at 00:38

## 2023-01-01 RX ADMIN — Medication 4 MILLIGRAM(S): at 00:36

## 2023-01-01 RX ADMIN — Medication 1 TABLET(S): at 12:25

## 2023-01-01 RX ADMIN — Medication 1 MILLIGRAM(S): at 16:56

## 2023-01-01 RX ADMIN — BUDESONIDE AND FORMOTEROL FUMARATE DIHYDRATE 2 PUFF(S): 160; 4.5 AEROSOL RESPIRATORY (INHALATION) at 10:10

## 2023-01-01 RX ADMIN — LEVETIRACETAM 500 MILLIGRAM(S): 250 TABLET, FILM COATED ORAL at 18:40

## 2023-01-01 RX ADMIN — Medication 1 PACKET(S): at 13:13

## 2023-01-01 RX ADMIN — Medication 1 PATCH: at 18:23

## 2023-01-01 RX ADMIN — Medication 1 PATCH: at 06:19

## 2023-01-01 RX ADMIN — Medication 4 MILLIGRAM(S): at 17:49

## 2023-01-01 RX ADMIN — Medication 1 MILLIGRAM(S): at 12:57

## 2023-01-01 RX ADMIN — MUPIROCIN 1 APPLICATION(S): 20 OINTMENT TOPICAL at 06:03

## 2023-01-01 RX ADMIN — Medication 4 MILLIGRAM(S): at 06:09

## 2023-01-01 RX ADMIN — Medication 1 MILLIGRAM(S): at 11:09

## 2023-01-01 RX ADMIN — Medication 50 MILLIGRAM(S): at 08:36

## 2023-01-01 RX ADMIN — LEVETIRACETAM 500 MILLIGRAM(S): 250 TABLET, FILM COATED ORAL at 17:48

## 2023-01-01 RX ADMIN — Medication 2 MILLIGRAM(S): at 06:53

## 2023-01-01 RX ADMIN — Medication 1 PATCH: at 13:53

## 2023-01-01 RX ADMIN — Medication 2 MILLIGRAM(S): at 23:27

## 2023-01-01 RX ADMIN — BUDESONIDE AND FORMOTEROL FUMARATE DIHYDRATE 2 PUFF(S): 160; 4.5 AEROSOL RESPIRATORY (INHALATION) at 17:26

## 2023-01-01 RX ADMIN — Medication 4 MILLIGRAM(S): at 13:53

## 2023-01-01 RX ADMIN — Medication 1 PATCH: at 06:10

## 2023-01-01 RX ADMIN — Medication 1 PATCH: at 12:39

## 2023-01-01 RX ADMIN — Medication 1 PATCH: at 18:09

## 2023-01-01 RX ADMIN — CHLORHEXIDINE GLUCONATE 1 APPLICATION(S): 213 SOLUTION TOPICAL at 13:40

## 2023-01-01 RX ADMIN — Medication 1 MILLIGRAM(S): at 11:50

## 2023-01-01 RX ADMIN — Medication 1 MILLIGRAM(S): at 12:29

## 2023-01-01 RX ADMIN — LEVETIRACETAM 500 MILLIGRAM(S): 250 TABLET, FILM COATED ORAL at 17:15

## 2023-01-01 RX ADMIN — Medication 2 MILLIGRAM(S): at 21:52

## 2023-01-01 RX ADMIN — BUDESONIDE AND FORMOTEROL FUMARATE DIHYDRATE 2 PUFF(S): 160; 4.5 AEROSOL RESPIRATORY (INHALATION) at 09:06

## 2023-01-01 RX ADMIN — Medication 1 MILLIGRAM(S): at 12:26

## 2023-01-01 RX ADMIN — Medication 1 PATCH: at 07:11

## 2023-01-01 RX ADMIN — BUDESONIDE AND FORMOTEROL FUMARATE DIHYDRATE 2 PUFF(S): 160; 4.5 AEROSOL RESPIRATORY (INHALATION) at 05:28

## 2023-01-01 RX ADMIN — Medication 4 MILLIGRAM(S): at 05:25

## 2023-01-01 RX ADMIN — Medication 1 PATCH: at 13:39

## 2023-01-01 RX ADMIN — BUDESONIDE AND FORMOTEROL FUMARATE DIHYDRATE 2 PUFF(S): 160; 4.5 AEROSOL RESPIRATORY (INHALATION) at 17:07

## 2023-01-01 RX ADMIN — BUDESONIDE AND FORMOTEROL FUMARATE DIHYDRATE 2 PUFF(S): 160; 4.5 AEROSOL RESPIRATORY (INHALATION) at 05:43

## 2023-01-01 RX ADMIN — Medication 2 MILLIGRAM(S): at 23:22

## 2023-01-01 RX ADMIN — LEVETIRACETAM 500 MILLIGRAM(S): 250 TABLET, FILM COATED ORAL at 18:13

## 2023-01-01 RX ADMIN — Medication 1 PATCH: at 12:25

## 2023-01-01 RX ADMIN — Medication 4 MILLIGRAM(S): at 07:22

## 2023-01-01 RX ADMIN — Medication 1 MILLIGRAM(S): at 11:41

## 2023-01-01 RX ADMIN — CHLORHEXIDINE GLUCONATE 1 APPLICATION(S): 213 SOLUTION TOPICAL at 11:23

## 2023-01-01 RX ADMIN — PANTOPRAZOLE SODIUM 40 MILLIGRAM(S): 20 TABLET, DELAYED RELEASE ORAL at 06:16

## 2023-01-01 RX ADMIN — BUDESONIDE AND FORMOTEROL FUMARATE DIHYDRATE 2 PUFF(S): 160; 4.5 AEROSOL RESPIRATORY (INHALATION) at 17:21

## 2023-01-01 RX ADMIN — Medication 1 PATCH: at 10:00

## 2023-01-01 RX ADMIN — Medication 1 PATCH: at 13:40

## 2023-01-01 RX ADMIN — Medication 1 MILLIGRAM(S): at 11:25

## 2023-01-01 RX ADMIN — Medication 1 MILLIGRAM(S): at 13:40

## 2023-01-01 RX ADMIN — BUDESONIDE AND FORMOTEROL FUMARATE DIHYDRATE 2 PUFF(S): 160; 4.5 AEROSOL RESPIRATORY (INHALATION) at 06:07

## 2023-01-01 RX ADMIN — Medication 1 TABLET(S): at 11:36

## 2023-01-01 RX ADMIN — Medication 1 MILLIGRAM(S): at 12:25

## 2023-01-01 RX ADMIN — LEVETIRACETAM 500 MILLIGRAM(S): 250 TABLET, FILM COATED ORAL at 17:07

## 2023-01-01 RX ADMIN — Medication 4 MILLIGRAM(S): at 23:47

## 2023-01-01 RX ADMIN — Medication 4 MILLIGRAM(S): at 06:18

## 2023-01-01 RX ADMIN — Medication 4 MILLIGRAM(S): at 05:40

## 2023-01-01 RX ADMIN — BUDESONIDE AND FORMOTEROL FUMARATE DIHYDRATE 2 PUFF(S): 160; 4.5 AEROSOL RESPIRATORY (INHALATION) at 17:16

## 2023-01-01 RX ADMIN — Medication 1 PATCH: at 19:28

## 2023-01-01 RX ADMIN — Medication 2 MILLIGRAM(S): at 22:30

## 2023-01-01 RX ADMIN — PANTOPRAZOLE SODIUM 40 MILLIGRAM(S): 20 TABLET, DELAYED RELEASE ORAL at 06:57

## 2023-01-01 RX ADMIN — TIOTROPIUM BROMIDE 2 PUFF(S): 18 CAPSULE ORAL; RESPIRATORY (INHALATION) at 10:37

## 2023-01-01 RX ADMIN — Medication 1 PATCH: at 18:08

## 2023-01-01 RX ADMIN — Medication 1 MILLIGRAM(S): at 13:58

## 2023-01-01 RX ADMIN — LEVETIRACETAM 500 MILLIGRAM(S): 250 TABLET, FILM COATED ORAL at 17:45

## 2023-01-01 RX ADMIN — PANTOPRAZOLE SODIUM 40 MILLIGRAM(S): 20 TABLET, DELAYED RELEASE ORAL at 08:14

## 2023-01-01 RX ADMIN — CHLORHEXIDINE GLUCONATE 1 APPLICATION(S): 213 SOLUTION TOPICAL at 11:42

## 2023-01-01 RX ADMIN — Medication 4 MILLIGRAM(S): at 05:35

## 2023-01-01 RX ADMIN — PANTOPRAZOLE SODIUM 40 MILLIGRAM(S): 20 TABLET, DELAYED RELEASE ORAL at 06:09

## 2023-01-01 RX ADMIN — LEVETIRACETAM 500 MILLIGRAM(S): 250 TABLET, FILM COATED ORAL at 05:25

## 2023-01-01 RX ADMIN — Medication 4 MILLIGRAM(S): at 12:57

## 2023-01-01 RX ADMIN — Medication 1 PATCH: at 19:52

## 2023-01-01 RX ADMIN — Medication 1 TABLET(S): at 12:30

## 2023-01-01 RX ADMIN — LEVETIRACETAM 500 MILLIGRAM(S): 250 TABLET, FILM COATED ORAL at 17:17

## 2023-01-01 RX ADMIN — CHLORHEXIDINE GLUCONATE 1 APPLICATION(S): 213 SOLUTION TOPICAL at 11:30

## 2023-01-01 RX ADMIN — Medication 4 MILLIGRAM(S): at 17:20

## 2023-01-01 RX ADMIN — CHLORHEXIDINE GLUCONATE 1 APPLICATION(S): 213 SOLUTION TOPICAL at 11:51

## 2023-01-01 RX ADMIN — Medication 1 PATCH: at 11:24

## 2023-01-01 RX ADMIN — Medication 4 MILLIGRAM(S): at 06:06

## 2023-01-01 RX ADMIN — BUDESONIDE AND FORMOTEROL FUMARATE DIHYDRATE 2 PUFF(S): 160; 4.5 AEROSOL RESPIRATORY (INHALATION) at 09:16

## 2023-01-01 RX ADMIN — LEVETIRACETAM 500 MILLIGRAM(S): 250 TABLET, FILM COATED ORAL at 05:35

## 2023-01-01 RX ADMIN — LEVETIRACETAM 500 MILLIGRAM(S): 250 TABLET, FILM COATED ORAL at 05:39

## 2023-01-01 RX ADMIN — Medication 1 PATCH: at 20:08

## 2023-01-01 RX ADMIN — CHLORHEXIDINE GLUCONATE 1 APPLICATION(S): 213 SOLUTION TOPICAL at 12:00

## 2023-01-01 RX ADMIN — Medication 2 MILLIGRAM(S): at 13:40

## 2023-01-01 RX ADMIN — LEVETIRACETAM 500 MILLIGRAM(S): 250 TABLET, FILM COATED ORAL at 17:53

## 2023-01-01 RX ADMIN — Medication 1 TABLET(S): at 13:23

## 2023-01-01 RX ADMIN — LEVETIRACETAM 500 MILLIGRAM(S): 250 TABLET, FILM COATED ORAL at 06:53

## 2023-01-01 RX ADMIN — Medication 2 MILLIGRAM(S): at 02:24

## 2023-01-01 RX ADMIN — BUDESONIDE AND FORMOTEROL FUMARATE DIHYDRATE 2 PUFF(S): 160; 4.5 AEROSOL RESPIRATORY (INHALATION) at 21:14

## 2023-01-01 RX ADMIN — Medication 1 MILLIGRAM(S): at 12:00

## 2023-01-01 RX ADMIN — BUDESONIDE AND FORMOTEROL FUMARATE DIHYDRATE 2 PUFF(S): 160; 4.5 AEROSOL RESPIRATORY (INHALATION) at 05:40

## 2023-01-01 RX ADMIN — BUDESONIDE AND FORMOTEROL FUMARATE DIHYDRATE 2 PUFF(S): 160; 4.5 AEROSOL RESPIRATORY (INHALATION) at 11:46

## 2023-01-01 RX ADMIN — Medication 4 MILLIGRAM(S): at 17:37

## 2023-01-01 RX ADMIN — Medication 1 PATCH: at 11:30

## 2023-01-01 RX ADMIN — Medication 4 MILLIGRAM(S): at 17:28

## 2023-01-01 RX ADMIN — PANTOPRAZOLE SODIUM 40 MILLIGRAM(S): 20 TABLET, DELAYED RELEASE ORAL at 05:15

## 2023-01-01 RX ADMIN — TIOTROPIUM BROMIDE 2 PUFF(S): 18 CAPSULE ORAL; RESPIRATORY (INHALATION) at 11:48

## 2023-01-01 RX ADMIN — Medication 1 PATCH: at 13:50

## 2023-01-01 RX ADMIN — TIOTROPIUM BROMIDE 2 PUFF(S): 18 CAPSULE ORAL; RESPIRATORY (INHALATION) at 11:25

## 2023-01-01 RX ADMIN — Medication 1 MILLIGRAM(S): at 11:24

## 2023-01-01 RX ADMIN — PANTOPRAZOLE SODIUM 40 MILLIGRAM(S): 20 TABLET, DELAYED RELEASE ORAL at 06:26

## 2023-01-01 RX ADMIN — PANTOPRAZOLE SODIUM 40 MILLIGRAM(S): 20 TABLET, DELAYED RELEASE ORAL at 07:22

## 2023-01-01 RX ADMIN — Medication 1 PACKET(S): at 21:43

## 2023-01-01 RX ADMIN — Medication 4 MILLIGRAM(S): at 23:59

## 2023-01-01 RX ADMIN — Medication 1 PATCH: at 07:46

## 2023-01-01 RX ADMIN — MUPIROCIN 1 APPLICATION(S): 20 OINTMENT TOPICAL at 06:45

## 2023-01-01 RX ADMIN — BUDESONIDE AND FORMOTEROL FUMARATE DIHYDRATE 2 PUFF(S): 160; 4.5 AEROSOL RESPIRATORY (INHALATION) at 17:20

## 2023-01-01 RX ADMIN — Medication 1 TABLET(S): at 13:53

## 2023-01-01 RX ADMIN — Medication 2 MILLIGRAM(S): at 11:41

## 2023-01-01 RX ADMIN — Medication 105 MILLIGRAM(S): at 16:01

## 2023-01-01 RX ADMIN — Medication 1 PATCH: at 19:04

## 2023-01-01 RX ADMIN — PANTOPRAZOLE SODIUM 40 MILLIGRAM(S): 20 TABLET, DELAYED RELEASE ORAL at 06:06

## 2023-01-01 RX ADMIN — Medication 1 PATCH: at 11:49

## 2023-01-01 RX ADMIN — Medication 4 MILLIGRAM(S): at 12:25

## 2023-01-01 RX ADMIN — Medication 1 PATCH: at 17:45

## 2023-01-01 RX ADMIN — Medication 1 TABLET(S): at 11:27

## 2023-01-01 RX ADMIN — BUDESONIDE AND FORMOTEROL FUMARATE DIHYDRATE 2 PUFF(S): 160; 4.5 AEROSOL RESPIRATORY (INHALATION) at 17:01

## 2023-01-01 RX ADMIN — Medication 2 MILLIGRAM(S): at 17:49

## 2023-01-01 RX ADMIN — LEVETIRACETAM 500 MILLIGRAM(S): 250 TABLET, FILM COATED ORAL at 18:11

## 2023-01-01 RX ADMIN — BUDESONIDE AND FORMOTEROL FUMARATE DIHYDRATE 2 PUFF(S): 160; 4.5 AEROSOL RESPIRATORY (INHALATION) at 17:49

## 2023-01-01 RX ADMIN — Medication 1 TABLET(S): at 12:45

## 2023-01-01 RX ADMIN — Medication 20 MILLIEQUIVALENT(S): at 10:12

## 2023-01-01 RX ADMIN — BUDESONIDE AND FORMOTEROL FUMARATE DIHYDRATE 2 PUFF(S): 160; 4.5 AEROSOL RESPIRATORY (INHALATION) at 06:19

## 2023-01-01 RX ADMIN — SODIUM CHLORIDE 100 MILLILITER(S): 9 INJECTION, SOLUTION INTRAVENOUS at 17:06

## 2023-01-01 RX ADMIN — Medication 1 PATCH: at 13:17

## 2023-01-01 RX ADMIN — TIOTROPIUM BROMIDE 2 PUFF(S): 18 CAPSULE ORAL; RESPIRATORY (INHALATION) at 12:55

## 2023-01-01 RX ADMIN — Medication 1 PATCH: at 22:07

## 2023-01-01 RX ADMIN — LEVETIRACETAM 500 MILLIGRAM(S): 250 TABLET, FILM COATED ORAL at 06:49

## 2023-01-01 RX ADMIN — Medication 1 PACKET(S): at 06:03

## 2023-01-01 RX ADMIN — CHLORHEXIDINE GLUCONATE 1 APPLICATION(S): 213 SOLUTION TOPICAL at 10:35

## 2023-01-01 RX ADMIN — BUDESONIDE AND FORMOTEROL FUMARATE DIHYDRATE 2 PUFF(S): 160; 4.5 AEROSOL RESPIRATORY (INHALATION) at 09:22

## 2023-01-01 RX ADMIN — Medication 1 TABLET(S): at 11:23

## 2023-01-01 RX ADMIN — TIOTROPIUM BROMIDE 2 PUFF(S): 18 CAPSULE ORAL; RESPIRATORY (INHALATION) at 11:50

## 2023-01-01 RX ADMIN — Medication 4 MILLIGRAM(S): at 06:49

## 2023-01-01 RX ADMIN — PANTOPRAZOLE SODIUM 40 MILLIGRAM(S): 20 TABLET, DELAYED RELEASE ORAL at 06:07

## 2023-01-01 RX ADMIN — Medication 1 TABLET(S): at 14:00

## 2023-01-01 RX ADMIN — Medication 4 MILLIGRAM(S): at 17:21

## 2023-01-01 RX ADMIN — Medication 1 PATCH: at 20:41

## 2023-01-01 RX ADMIN — TIOTROPIUM BROMIDE 2 PUFF(S): 18 CAPSULE ORAL; RESPIRATORY (INHALATION) at 11:29

## 2023-01-01 RX ADMIN — Medication 63.75 MILLIMOLE(S): at 13:13

## 2023-01-01 RX ADMIN — Medication 1 PATCH: at 12:40

## 2023-04-23 NOTE — ED ADULT NURSE NOTE - NSIMPLEMENTINTERV_GEN_ALL_ED
Implemented All Fall Risk Interventions:  [place] to call system. Call bell, personal items and telephone within reach. Instruct patient to call for assistance. Room bathroom lighting operational. Non-slip footwear when patient is off stretcher. Physically safe environment: no spills, clutter or unnecessary equipment. Stretcher in lowest position, wheels locked, appropriate side rails in place. Provide visual cue, wrist band, yellow gown, etc. Monitor gait and stability. Monitor for mental status changes and reorient to person, place, and time. Review medications for side effects contributing to fall risk. Reinforce activity limits and safety measures with patient and family.

## 2023-04-23 NOTE — ED ADULT NURSE NOTE - OBJECTIVE STATEMENT
AAOx3 pt presents to ED c/o right sided weakness and numbness since 4/21. Pt reports falling out of bed, scrapes on b/l lower legs. At time of assessment no facial droop, speech is clear, slight right sided weakness in uppers and lowers however no drift on either side. Denies PMH.

## 2023-04-23 NOTE — ED ADULT NURSE NOTE - NSHOSCREENINGQ1_ED_ALL_ED
[FreeTextEntry1] : Probable viral syndrome: Follow-up AM at Urgent Care center for COVID/Flu testing and physical exam. Supportive care with Tylenol .Dosing reviewed. Oral hydration encouraged.\par \par Gastritis: Protonix script sent. Advised to take Carafate concurrently. Avoid NSAIDs or take with food if needed. Will have referral coordinator help with GI follow-up. No

## 2023-04-23 NOTE — ED ADULT NURSE NOTE - BEFAST ARM SIDE DRIFT
July 13, 2017      Owen Frederick Jr., MD  [address] [place] [address]           [facility]  [address] [place], [place] [address] [place] [address]  Phone: [phone]          Patient: Kuldeep Alamo   MR Number: [id number]   YOB: 1964   Date of Visit: 7/13/2017       Dear Dr. Owen Frederick Jr.:    Thank you for referring Kuldeep Alamo to me for evaluation. Attached you will find relevant portions of my assessment and plan of care.    If you have questions, please do not hesitate to call me. I look forward to following Kuldeep Alamo along with you.    Sincerely,    Noa Fernandez, [facility]    Enclosure  CC:  No Recipients    If you would like to receive this communication electronically, please contact [email or web address] or [phone] to request more information on [employer] access.    For providers and/or their staff who would like to refer a patient to [facility], please contact us through our one-stop-shop provider referral line, [employer] , at [phone].    If you feel you have received this communication in error or would no longer like to receive these types of communications, please e-mail [email or web address]         
Yes

## 2023-04-24 NOTE — H&P ADULT - NSHPLABSRESULTS_GEN_ALL_CORE
LABS:   personally reviewed                        12.8   6.79  )-----------( 167      ( 24 Apr 2023 04:10 )             36.4     04-24    140  |  106  |  16  ----------------------------<  115<H>  3.9   |  24  |  1.20    Ca    8.6      24 Apr 2023 04:10    TPro  6.8  /  Alb  3.7  /  TBili  0.5  /  DBili  x   /  AST  26  /  ALT  13  /  AlkPhos  86  04-24    PT/INR - ( 24 Apr 2023 04:10 )   PT: 13.1 sec;   INR: 1.13 ratio         PTT - ( 24 Apr 2023 04:10 )  PTT:30.7 sec      < from: CT Head No Cont (04.23.23 @ 22:20) >    Left frontal parietal mass with marked vasogenic edema. Further   evaluation with contrast-enhanced MRI is recommended.    < end of copied text >

## 2023-04-24 NOTE — ED PROVIDER NOTE - CLINICAL SUMMARY MEDICAL DECISION MAKING FREE TEXT BOX
Adam Lundberg, PGY-3- 77 year old male with a pmhx of etoh use, transferred to ED for evaluation of brain mass in setting of RUE weakness x2 days and tingling. Pt transferred for neurosurgical evaluation. Pt poor historian w hx of EtOH use. Neurosx rec additional CTs to eval for malignancy and admission to medicine. Pt declining surgery and radiation therapy may be option for pt. Adam Lundberg, PGY-3- 77 year old male with a pmhx of etoh use, transferred to ED for evaluation of brain mass in setting of RUE weakness x2 days and tingling. Pt transferred for neurosurgical evaluation. Pt poor historian w hx of EtOH use. Neurosx rec additional CTs to eval for malignancy and admission to medicine. Pt declining surgery and radiation therapy may be option for pt.    Dr. Julian (Attending Physician)

## 2023-04-24 NOTE — ED PROVIDER NOTE - NSFOLLOWUPCLINICS_GEN_ALL_ED_FT
[facility] Neurosurgery  Neurosurgery  Referral Assistance Program  [place]   Phone:   Fax:

## 2023-04-24 NOTE — H&P ADULT - PROBLEM SELECTOR PLAN 1
R sided weakness/numbness x 2 day, found to have Left parietal mass w/ vasogenic edema  - CTA neck with Left vertebral stenosis but good basilar filling.  - appreciate NSG recs: recommend MRI  - MRI ordered; pt would need to think about it  - continue decadron 4mg q6hr  - continue keppra 500mg BID  - pt currently refusing any surgical intervention R sided weakness/numbness x 2 day, found to have Left parietal mass w/ vasogenic edema  - CTA neck with Left vertebral stenosis but good basilar filling.  - appreciate NSG recs: recommend MRI  - MRI ordered; pt would need to think about it  - continue decadron 4mg q6hr  - continue keppra 500mg BID  - pt currently refusing any surgical intervention    - CT chest/abd/pelv without signs of other malignancy or mets except lymphdenopathy

## 2023-04-24 NOTE — ED PROVIDER NOTE - ATTENDING CONTRIBUTION TO CARE
Dr. Julian (Attending Physician)  I performed a history and physical exam of the patient and discussed their management with the resident. I reviewed the resident's note and agree with the documented findings and plan of care. My medical decision making and observations are found above.

## 2023-04-24 NOTE — CONSULT NOTE ADULT - ASSESSMENT
77M, LVS txfer. No known PMH due to patient not having PCP. P/w Right hemiparesis x2d. Presents to ED only now because he originally did not want to seek medical attention, and symptoms have persisted. CT shows Left parietal mass w/ vasogenic edema, no sig MLS. CTA w/ Left vertebral stenosis but good basilar filling. Exam: AOx3, PERRL, EOMI, no facial, RUE 4/5 w/ drift, RLE 4+/5, Left side 5/5   -Adm Medicine, patient currently adamantly refusing any surgical intervention  -MRI Brain stereo w/wo, CT CAP  -Dex 4q6, Keppra 500BID 77M, Left-handed, LVS txfer. No known PMH due to patient not having PCP. P/w Right hemiparesis x2d. Presents to ED only now because he originally did not want to seek medical attention, and symptoms have persisted. CT shows Left parietal mass w/ vasogenic edema, no sig MLS. CTA w/ Left vertebral stenosis but good basilar filling. Exam: AOx3, PERRL, EOMI, no facial, RUE 4/5 w/ drift, RLE 4+/5, Left side 5/5   -Adm Medicine, patient currently adamantly refusing any surgical intervention  -MRI Brain stereo w/wo, CT CAP  -Dex 4q6, Keppra 500BID

## 2023-04-24 NOTE — ED PROVIDER NOTE - PHYSICAL EXAMINATION
General: well appearing, no acute distress, AOx3  Skin: no rash, no pallor  Head: normocephalic, atraumatic  Eyes: clear conjunctiva, EOMI, PERRL  ENMT: airway patent, no nasal discharge  Cardiovascular: normal rate, normal rhythm, S1/S2  Pulmonary: clear to auscultation bilaterally, no rales, rhonchi, or wheeze  Abdomen: soft, nontender  Musculoskeletal: moving extremities well, no deformity  Neuro: CN II-XII grossly intact, 5/5 strength LUE, b/l LEs, 4/5 strength RUE, speech clear, steady gait, sensation intact   Psych: normal mood, normal affect

## 2023-04-24 NOTE — ED ADULT NURSE REASSESSMENT NOTE - NS ED NURSE REASSESS COMMENT FT1
Pt A&Ox4, reluctant to complete evaluation and or admission stating " I don't like hospital".  Writer spoke to pt at length regarding admission, required consultation and the benefits. Pt verbalizes understanding and is in agreement to complete evaluation at this time.
Pt breathing spontaneous and unlabored. No acute changes in nuero status or mental status. Cardiac monitor and pulse ox in place pt in NAD at this time

## 2023-04-24 NOTE — ED ADULT NURSE REASSESSMENT NOTE - NS ED NURSE REASSESS COMMENT FT1
received pt fully dressed. Spoke with pt about need to be changed into hospital gown as to be admitted to hospital and is to have an MRI. Pt refusing, yelling that he is not staying, that he has to go to work. Not allowing further exam at present. Pt re-educated on importance of further testing. Pt still refusing. DR IRMA Cornell made aware and to speak with pt.
ED MD Dr Farrar spoke with pt and with patients sister via phone with pt present and explained the importance of admission and further testing. pt and family understood per Dr Farrar and pt now calm and cooperative, agreeing to change into hospital gown and further assessment. Pt states "last drink was Saturday. I drink 1pint of Bony white rum every day"
At present pt able to move rt arm freely on own, no drift noted. Pt states "it comes and goes where I can't move it."

## 2023-04-24 NOTE — H&P ADULT - NSHPREVIEWOFSYSTEMS_GEN_ALL_CORE
Review of Systems:   CONSTITUTIONAL: No fever, weight loss, or fatigue  EYES: No eye pain, visual disturbances, or discharge  ENMT:  No difficulty hearing, tinnitus, vertigo; No sinus or throat pain  NECK: No pain or stiffness  BREASTS: No pain, masses, or nipple discharge  RESPIRATORY: No cough, wheezing, chills or hemoptysis; No shortness of breath  CARDIOVASCULAR: No chest pain, palpitations, dizziness, or leg swelling  GASTROINTESTINAL: No abdominal or epigastric pain. No nausea, vomiting, or hematemesis; No diarrhea or constipation. No melena or hematochezia.  GENITOURINARY: No dysuria, frequency, hematuria, or incontinence  NEUROLOGICAL: R sided weakness/numbness  SKIN: No itching, burning, rashes, or lesions   LYMPH NODES: No enlarged glands  ENDOCRINE: No heat or cold intolerance; No hair loss  MUSCULOSKELETAL: No joint pain or swelling; No muscle, back, or extremity pain  PSYCHIATRIC: No depression, anxiety, mood swings, or difficulty sleeping  HEME/LYMPH: No easy bruising, or bleeding gums  ALLERGY AND IMMUNOLOGIC: No hives or eczema

## 2023-04-24 NOTE — ED PROVIDER NOTE - PROGRESS NOTE DETAILS
Adam Lundberg, PGY-3- pt needed convincing to get CTs of chest/abd/pelv, eventually agreeable Aram Farrar PGY3: Pt signed out to me pending CT results. CTs show mediastinal and right hilar lymphadenopathy as well as nonspecific clustered nodules in the left lower lobe. Will admit to medicine for further oncologic w/u.

## 2023-04-24 NOTE — H&P ADULT - HISTORY OF PRESENT ILLNESS
77M with EtOH use disorder, no known PMHx as hasn't seen doctors in decades, presents with R sided weakness/numbness x 2 days. He states that he woke up woke up 2 days ago and did not have sensation in his right arm or leg, states he fell over.  Did not want to seek medical attention at that time. He states that right leg function has gradually improved but right arm function is still weak. Since the symptoms didn't get better, he came to ED for further evaluations. Denies head trauma, LOC, fever/chills, syncope.    He states that he has been drinking a bottle of rum everyday and smokes 4-5 cigarettes/day forever. Denies hx of withdrawal seizures, hallucinations or DTs. No hospitalizations for EtOH withdrawals as per patient. He lives with his sister and is not interested in seeing doctor as outpatient.    In ED,  CT shows Left parietal mass w/ vasogenic edema, no sig MLS. CTA w/ Left vertebral stenosis but good basilar filling

## 2023-04-24 NOTE — H&P ADULT - NSHPPHYSICALEXAM_GEN_ALL_CORE
Vital Signs Last 24 Hrs  T(C): 36.8 (24 Apr 2023 08:25), Max: 37.2 (23 Apr 2023 19:34)  T(F): 98.3 (24 Apr 2023 08:25), Max: 98.9 (23 Apr 2023 19:34)  HR: 68 (24 Apr 2023 08:25) (59 - 105)  BP: 127/64 (24 Apr 2023 08:25) (104/73 - 143/87)  BP(mean): --  RR: 20 (24 Apr 2023 08:25) (16 - 23)  SpO2: 96% (24 Apr 2023 08:25) (94% - 100%)    Parameters below as of 24 Apr 2023 08:25  Patient On (Oxygen Delivery Method): room air      CAPILLARY BLOOD GLUCOSE      POCT Blood Glucose.: 132 mg/dL (23 Apr 2023 19:39)    I&O's Summary      PHYSICAL EXAM:  GENERAL: NAD, well-developed  HEAD:  Atraumatic, Normocephalic  EYES: EOMI, PERRLA, conjunctiva and sclera clear  MOUTH: no oral thrush  NECK: Supple, No JVD  CHEST/LUNG: Clear to auscultation bilaterally; No wheeze  HEART: Regular rate and rhythm; No murmurs, rubs, or gallops  ABDOMEN: Soft, Nontender, Nondistended; Bowel sounds present  EXTREMITIES:  2+ Peripheral Pulses, No clubbing, cyanosis, or edema  NEUROLOGY: AAOx3, RUE strength 4/5, RLE strength 4/5, sensation intact, (+) R drift  SKIN: No rashes or lesions

## 2023-04-24 NOTE — ED PROVIDER NOTE - NSICDXNOPASTMEDICALHX_GEN_ALL_ED
The patient is a 66y Female complaining of see chief complaint quote.
<-- Click to add NO pertinent Past Medical History

## 2023-04-24 NOTE — ED ADULT NURSE REASSESSMENT NOTE - NURSING MUSC EXTREMITY LIMITED ROM
Pt unable to move RUE on own + drift immediately to bed when RUE lifted for pt/right upper extremity

## 2023-04-24 NOTE — ED ADULT NURSE NOTE - OBJECTIVE STATEMENT
Pt is 77Y M, denies pmhx, transfer from  [facility] for right sided weakness, tingling, dx with left side brain mass at . Pt states he began to feel weakness in legs, tingling of left side extremities, pt denies any pmhx, pt states he only takes vitamins, pt is ambulatory at baseline, Pt denies any pain, placed cardiac-NSR, pt on RA, pt PERLL, full ROM of extremities, slight drift right arm, +4 strength of extremities, pt denies any loss of sensation, pt denies all other symptoms, Pt is A&Ox4, updated on plan of care, comfort and safety secured

## 2023-04-24 NOTE — CHART NOTE - NSCHARTNOTEFT_GEN_A_CORE
-The patient's MR brain is c/f metastatic disease, the multiple lesions would best be addressed by a rad-onc consult and SRS.

## 2023-04-24 NOTE — H&P ADULT - PROBLEM SELECTOR PLAN 3
daily consumption of a bottle of rum  - no hx of withdrawal seizures, hallucinations, DTs or hospitalizations  - start on ativan taper regimen  - CIWA  - start on multivitamin, folate, thiamine

## 2023-04-24 NOTE — ED PROVIDER NOTE - NSFOLLOWUPINSTRUCTIONS_ED_ALL_ED_FT
You have decided to leave the hospital AGAINST MEDICAL ADVICE.  You are at risk for worsening neurological status, permanent disability and death.  Please return to this ED or to [facility] at [address] , [place] [address] patient neurosurgical evaluation is soon as possible

## 2023-04-24 NOTE — ED PROVIDER NOTE - PHYSICAL EXAMINATION
General: No acute distress, mentation at baseline,  well nourished, well developed  HEENT: NCAT, Neck supple without meningismus, PERRL, no conjunctival injection  Lungs: CTAB, No wheeze or crackles, No retractions, No increased work of breathing  Heart: S1S2 RRR, No M/R/G, Pules equal Bilaterally in upper and lower extremities distally  Abd: soft, NT/ND, No guarding, No rebound.  No hernias, no palpable masses.  Extrem: FROM in all joints, no gross deformities appreciated, no significant edema noted, No ulcers. Cap refil < 2sec.  Skin: No rash noted, warm dry.  Neuro:    General: alert and oriented, mood and affect normal  Speech: normal, no aphasia or dysarthria  Cranial nerves: CN2-12 in tact  Peripheral exam: 5/5 motor strength in left UEs and LEs, 4.5/5 strength in RUE and RLE, sensation intact, (+) R pronator drift, normal finger to nose    Psychiatric: Appropriate mood and affect.

## 2023-04-24 NOTE — ED ADULT NURSE NOTE - EXTENSIONS OF SELF_ADULT
----- Message from Aram Aguilar MD sent at 1/19/2018  8:11 AM CST -----  Low fat diet. 24 hour urine for protein. K-dur 20 meq. PO qAM. Lipid profile 3 months. Glycohemoglobin, serum potassium 14 days. Orders placed.   None

## 2023-04-24 NOTE — ED PROVIDER NOTE - PROGRESS NOTE DETAILS
CT shows L front parietal mass with vasogenic edema. spoke to patient about results and need for neurosurgery consult and transfer to higher level care. pt states he is "not ready for all this yet", staetes he fully understands risks of leaving hospital but wants to "talk to my sister and have her bring me to the big hospital tomorrow". states he would like to AMA despite risks        The patient has decided to leave against medical advice.  The patient is AAOx3, not intoxicated, and displays normal decision making ability. We discussed all risks, benefits, and alternatives to the progression of treatment and the potential dangers of leaving including but not limited to permanent disability, injury, and death.  The patient was instructed that they are welcome to change their decision to leave against medical advice and return to the emergency department at any time and for any reason in order to allow us to render care. pt now stating he wants to stay, will call transfer center to speak to neurosurgery neurosurgeon Dr Garcia accepted pt, will do ED to ED transfer

## 2023-04-24 NOTE — ED PROVIDER NOTE - CLINICAL SUMMARY MEDICAL DECISION MAKING FREE TEXT BOX
Work-up patient outside of window for stroke code.  Will order stroke labs and imaging, clinical presentation likely secondary to ischemic versus hemorrhagic stroke versus mass.  Low suspicion for spinal cord pathology.  Likely will require admission with neuro

## 2023-04-24 NOTE — ED PROVIDER NOTE - OBJECTIVE STATEMENT
77-year-old male with no past medical history presenting with right-sided weakness for 2 days.  Patient states he has not been to a doctor in decades, does not know his prior medical history.  Patient states he woke up 2 days ago and did not have sensation in his right arm or leg, states he fell over.  Did not want to seek medical attention at that time.  Patient denies any head trauma, not on AC or aspirin.  States that right leg function has gradually improved right arm function is still weak.  Denies any facial numbness or weakness.  Denies nausea vomiting

## 2023-04-24 NOTE — ED PROVIDER NOTE - EKG ADDITIONAL INFORMATION FREE TEXT
As interpreted by ED physician, ECG is NSR with normal intervals/axis, no changes in QRS, no ST/T changes, no signs of Brugada, epsilon or delta wave, HCOM, QT prolongation, S1Q3T3.

## 2023-04-24 NOTE — ED PROVIDER NOTE - OBJECTIVE STATEMENT
Adam Tamika, PGY-3- 77 year old male with no pmhx, daily alcohol use, "rum", is transferred to ED for evaluation of R hemiparesis x2 days. Pt reports tingling of R arm prompting him to go to ED. Pt found to have left parietal mass w vasogenic edema. Reports no pmhx. No daily meds. No hx of malignancy. Does not see dr. Browne hx of withdrawal.

## 2023-04-24 NOTE — ED PROVIDER NOTE - DIFFERENTIAL DIAGNOSIS
DDx includes but is not limited to- Differential Diagnosis DDx includes but is not limited to- brain mass, ich, electrolyte abnormality

## 2023-04-24 NOTE — ED PROVIDER NOTE - PATIENT PORTAL LINK FT
You can access the FollowMyHealth Patient Portal offered by [facility] by registering at the following website: [email or web address]. By joining [facility]’s FollowMyHealth portal, you will also be able to view your health information using other applications (apps) compatible with our system.

## 2023-04-24 NOTE — H&P ADULT - ASSESSMENT
77M with EtOH use disorder, no known PMHx as hasn't seen doctors in decades, presents with R sided weakness/numbness x 2 day, found to have Left parietal mass w/ vasogenic edema

## 2023-04-24 NOTE — ED PROVIDER NOTE - WET READ LAUNCH FT
Patient was placed on the wait list in error, patient notified and verbalized understanding   There are no Wet Read(s) to document.

## 2023-04-24 NOTE — CONSULT NOTE ADULT - SUBJECTIVE AND OBJECTIVE BOX
p (1480)     HPI:  77M, LVS txfer. No known PMH due to patient not having PCP. P/w Right hemiparesis x2d. Presents to ED only now because he originally did not want to seek medical attention, and symptoms have persisted. CT shows Left parietal mass w/ vasogenic edema, no sig MLS. CTA w/ Left vertebral stenosis but good basilar filling. Exam: AOx3, PERRL, EOMI, no facial, RUE 4/5 w/ drift, RLE 4+/5, Left side 5/5       =====================  PAST MEDICAL HISTORY   No pertinent past medical history      PAST SURGICAL HISTORY   No significant past surgical history      No Known Allergies      MEDICATIONS:  Antibiotics:    Neuro:  levETIRAcetam 500 milliGRAM(s) Oral two times a day    Other:  dexAMETHasone     Tablet 4 milliGRAM(s) Oral every 6 hours      SOCIAL HISTORY:   Occupation:   Marital Status:     FAMILY HISTORY:  No pertinent family history in first degree relatives        ROS: Negative except per HPI    LABS:  PT/INR - ( 24 Apr 2023 04:10 )   PT: 13.1 sec;   INR: 1.13 ratio         PTT - ( 24 Apr 2023 04:10 )  PTT:30.7 sec                        12.8   6.79  )-----------( 167      ( 24 Apr 2023 04:10 )             36.4     04-23    138  |  107  |  14  ----------------------------<  147<H>  5.2   |  29  |  1.30    Ca    8.7      23 Apr 2023 20:15    TPro  7.3  /  Alb  3.3  /  TBili  1.0  /  DBili  x   /  AST  34  /  ALT  24  /  AlkPhos  92  04-23       p (1480)     HPI:  77M Left-handed, LVS txfer. No known PMH due to patient not having PCP. P/w Right hemiparesis x2d. Presents to ED only now because he originally did not want to seek medical attention, and symptoms have persisted. CT shows Left parietal mass w/ vasogenic edema, no sig MLS. CTA w/ Left vertebral stenosis but good basilar filling. Exam: AOx3, PERRL, EOMI, no facial, RUE 4/5 w/ drift, RLE 4+/5, Left side 5/5       =====================  PAST MEDICAL HISTORY   No pertinent past medical history      PAST SURGICAL HISTORY   No significant past surgical history      No Known Allergies      MEDICATIONS:  Antibiotics:    Neuro:  levETIRAcetam 500 milliGRAM(s) Oral two times a day    Other:  dexAMETHasone     Tablet 4 milliGRAM(s) Oral every 6 hours      SOCIAL HISTORY:   Occupation:   Marital Status:     FAMILY HISTORY:  No pertinent family history in first degree relatives        ROS: Negative except per HPI    LABS:  PT/INR - ( 24 Apr 2023 04:10 )   PT: 13.1 sec;   INR: 1.13 ratio         PTT - ( 24 Apr 2023 04:10 )  PTT:30.7 sec                        12.8   6.79  )-----------( 167      ( 24 Apr 2023 04:10 )             36.4     04-23    138  |  107  |  14  ----------------------------<  147<H>  5.2   |  29  |  1.30    Ca    8.7      23 Apr 2023 20:15    TPro  7.3  /  Alb  3.3  /  TBili  1.0  /  DBili  x   /  AST  34  /  ALT  24  /  AlkPhos  92  04-23

## 2023-04-25 NOTE — PATIENT PROFILE ADULT - HISTORY OF COVID-19 VACCINATION
Problem: PAIN - ADULT  Goal: Verbalizes/displays adequate comfort level or baseline comfort level  Description: Interventions:  - Encourage patient to monitor pain and request assistance  - Assess pain using appropriate pain scale  - Administer analgesics based on type and severity of pain and evaluate response  - Implement non-pharmacological measures as appropriate and evaluate response  - Consider cultural and social influences on pain and pain management  - Notify physician/advanced practitioner if interventions unsuccessful or patient reports new pain  Outcome: Progressing     Problem: INFECTION - ADULT  Goal: Absence or prevention of progression during hospitalization  Description: INTERVENTIONS:  - Assess and monitor for signs and symptoms of infection  - Monitor lab/diagnostic results  - Monitor all insertion sites, i e  indwelling lines, tubes, and drains  - Monitor endotracheal if appropriate and nasal secretions for changes in amount and color  - [place] appropriate cooling/warming therapies per order  - Administer medications as ordered  - Instruct and encourage patient and family to use good hand hygiene technique  - Identify and instruct in appropriate isolation precautions for identified infection/condition  Outcome: Progressing     Problem: SAFETY ADULT  Goal: Maintain or return to baseline ADL function  Description: INTERVENTIONS:  -  Assess patient's ability to carry out ADLs; assess patient's baseline for ADL function and identify physical deficits which impact ability to perform ADLs (bathing, care of mouth/teeth, toileting, grooming, dressing, etc )  - Assess/evaluate cause of self-care deficits   - Assess range of motion  - Assess patient's mobility; develop plan if impaired  - Assess patient's need for assistive devices and provide as appropriate  - Encourage maximum independence but intervene and supervise when necessary  - Involve family in performance of ADLs  - Assess for home care needs following discharge   - Consider OT consult to assist with ADL evaluation and planning for discharge  - Provide patient education as appropriate  Outcome: Progressing     Problem: DISCHARGE PLANNING  Goal: Discharge to home or other facility with appropriate resources  Description: INTERVENTIONS:  - Identify barriers to discharge w/patient and caregiver  - Arrange for needed discharge resources and transportation as appropriate  - Identify discharge learning needs (meds, wound care, etc )  - Arrange for interpretive services to assist at discharge as needed  - Refer to Case Management Department for coordinating discharge planning if the patient needs post-hospital services based on physician/advanced practitioner order or complex needs related to functional status, cognitive ability, or social support system  Outcome: Progressing     Problem: Knowledge Deficit  Goal: Patient/family/caregiver demonstrates understanding of disease process, treatment plan, medications, and discharge instructions  Description: Complete learning assessment and assess knowledge base    Interventions:  - Provide teaching at level of understanding  - Provide teaching via preferred learning methods  Outcome: Progressing     Problem: Potential for Falls  Goal: Patient will remain free of falls  Description: INTERVENTIONS:  - Educate patient/family on patient safety including physical limitations  - Instruct patient to call for assistance with activity   - Consult OT/PT to assist with strengthening/mobility   - Keep Call bell within reach  - Keep bed low and locked with side rails adjusted as appropriate  - Keep care items and personal belongings within reach  - Initiate and maintain comfort rounds  - Make Fall Risk Sign visible to staff  - Offer Toileting every 2 Hours, in advance of need  - Initiate/Maintain bed alarm  - Obtain necessary fall risk management equipment: bed/chair alarm  - Apply yellow socks and bracelet for high fall risk patients  - Consider moving patient to room near nurses station  Outcome: Progressing     Problem: Prexisting or High Potential for Compromised Skin Integrity  Goal: Skin integrity is maintained or improved  Description: INTERVENTIONS:  - Identify patients at risk for skin breakdown  - Assess and monitor skin integrity  - Assess and monitor nutrition and hydration status  - Monitor labs   - Assess for incontinence   - Turn and reposition patient  - Assist with mobility/ambulation  - Relieve pressure over bony prominences  - Avoid friction and shearing  - Provide appropriate hygiene as needed including keeping skin clean and dry  - Evaluate need for skin moisturizer/barrier cream  - Collaborate with interdisciplinary team   - Patient/family teaching  - Consider wound care consult   Outcome: Progressing     Problem: Nutrition/Hydration-ADULT  Goal: Nutrient/Hydration intake appropriate for improving, restoring or maintaining nutritional needs  Description: Monitor and assess patient's nutrition/hydration status for malnutrition  Collaborate with interdisciplinary team and initiate plan and interventions as ordered  Monitor patient's weight and dietary intake as ordered or per policy  Utilize nutrition screening tool and intervene as necessary  Determine patient's food preferences and provide high-protein, high-caloric foods as appropriate       INTERVENTIONS:  - Monitor oral intake, urinary output, labs, and treatment plans  - Assess nutrition and hydration status and recommend course of action  - Evaluate amount of meals eaten  - Assist patient with eating if necessary   - Allow adequate time for meals  - Recommend/ encourage appropriate diets, oral nutritional supplements, and vitamin/mineral supplements  - Order, calculate, and assess calorie counts as needed  - Recommend, monitor, and adjust tube feedings and TPN/PPN based on assessed needs  - Assess need for intravenous fluids  - Provide specific nutrition/hydration education as appropriate  - Include patient/family/caregiver in decisions related to nutrition  Outcome: Progressing     Problem: MOBILITY - ADULT  Goal: Maintain or return to baseline ADL function  Description: INTERVENTIONS:  -  Assess patient's ability to carry out ADLs; assess patient's baseline for ADL function and identify physical deficits which impact ability to perform ADLs (bathing, care of mouth/teeth, toileting, grooming, dressing, etc )  - Assess/evaluate cause of self-care deficits   - Assess range of motion  - Assess patient's mobility; develop plan if impaired  - Assess patient's need for assistive devices and provide as appropriate  - Encourage maximum independence but intervene and supervise when necessary  - Involve family in performance of ADLs  - Assess for home care needs following discharge   - Consider OT consult to assist with ADL evaluation and planning for discharge  - Provide patient education as appropriate  Outcome: Progressing  Goal: Maintains/Returns to pre admission functional level  Description: INTERVENTIONS:  - Perform BMAT or MOVE assessment daily    - Set and communicate daily mobility goal to care team and patient/family/caregiver  - Collaborate with rehabilitation services on mobility goals if consulted  - Perform Range of Motion 6 times a day  - Reposition patient every 2 hours    - Dangle patient 6 times a day  - Stand patient 6 times a day  - Ambulate patient 6 times a day  - Out of bed to chair 6 times a day   - Out of bed for meals 6 times a day  - Out of bed for toileting  - Record patient progress and toleration of activity level   Outcome: Progressing Yes

## 2023-04-25 NOTE — PROGRESS NOTE ADULT - SUBJECTIVE AND OBJECTIVE BOX
Pt seen and examined,  Awake alert  BOYCE with some Rt weakness.  MRI shows mult brain mets.    Pt being sched for mediastinal bx for tissue dx.  Pt will likely be candidate for [facility] SRS pending the tissue diagnosis.  This would be done as an outpt

## 2023-04-25 NOTE — PROGRESS NOTE ADULT - ASSESSMENT
77M with EtOH use disorder, no known PMHx as hasn't seen doctors in decades, presents with R sided weakness/numbness x 2 day, found to have Left parietal mass w/ vasogenic edema 77M with EtOH use disorder, no known PMHx as hasn't seen doctors in decades, presents with R sided weakness/numbness x 2 day, found to have Left parietal mass w/ vasogenic edema.

## 2023-04-25 NOTE — CONSULT NOTE ADULT - ATTENDING COMMENTS
78 y/o M smoker w/ETOH abuse found to have brain mass and mediastinal adenopathy. Likely metastatic cancer, however would not pursue workup currently as patient is in active withdrawal from ETOH.    - Treat for alcohol withdrawal  - Please recall when patient is no longer withdrawing and can attempt to arrange for EBUS at that time, however biopsy can also be done as outpatient 78 y/o M smoker w/ETOH abuse found to have multiple brain lesions and mediastinal adenopathy. Likely metastatic cancer, however would not pursue workup currently as patient is in active withdrawal from ETOH.    - Treat for alcohol withdrawal  - Please recall when patient is no longer withdrawing and can attempt to arrange for EBUS at that time, however biopsy can also be done as outpatient 76 y/o M smoker w/ETOH abuse found to have multiple brain masses and mediastinal adenopathy. Likely metastatic cancer, however would not pursue workup currently as patient is in active withdrawal from ETOH.    - Treat for alcohol withdrawal  - Please recall when patient is no longer withdrawing and can attempt to arrange for EBUS at that time, however biopsy can also be done as outpatient

## 2023-04-25 NOTE — PROGRESS NOTE ADULT - PROBLEM SELECTOR PLAN 3
daily consumption of a bottle of rum  - no hx of withdrawal seizures, hallucinations, DTs or hospitalizations  - start on ativan taper regimen  - CIWA  - start on multivitamin, folate, thiamine Daily consumption of a bottle of rum, unclear when last drink was - pt does not remember   No hx of withdrawal seizures, hallucinations, DTs or hospitalizations  S/p ativan taper overnight, appeared lethargic this AM, d/c   - CIWA symptom triggered   - started on multivitamin, folate, thiamine

## 2023-04-25 NOTE — PATIENT PROFILE ADULT - FUNCTIONAL ASSESSMENT - BASIC MOBILITY 6.
3-calculated by average/Not able to assess (calculate score using [facility] averaging method)  2-calculated by average/Not able to assess (calculate score using [facility] averaging method)

## 2023-04-25 NOTE — CONSULT NOTE ADULT - SUBJECTIVE AND OBJECTIVE BOX
Interventional Radiology    Evaluate for Procedure: mediastinal/hilar LAD biopsy    HPI: 77y Male with a PMH of ETOH use disorder, no other known PMH as hasn't seen doctors in decades, presents with Right sided weakness/numbness x 2 day, found to have Left parietal mass with vasogenic edema. Per neuro MR brain is c/f metastatic disease. CT chest shows Mediastinal and right hilar lymphadenopathy. Nonspecific clustered nodules in the left lower lobe. IR consulted for mediastinal/hilar LAD biopsy.    Allergies: No Known Allergies    Medications (Abx/Cardiac/Anticoagulation/Blood Products)      Data:  55  T(C): 36.7  HR: 66  BP: 110/71  RR: 18  SpO2: 97%    -WBC 6.79 / HgB 12.8 / Hct 36.4 / Plt 167  -Na 140 / Cl 106 / BUN 16 / Glucose 115  -K 3.9 / CO2 24 / Cr 1.20  -ALT 13 / Alk Phos 86 / T.Bili 0.5  -INR 1.13 / PTT 30.7    Radiology: < from: MR Brain Stereotactic w/wo IV Cont (04.24.23 @ 14:49) >    ACC: [id number] EXAM:  MR BRAIN WAW IC FOR SRS   ORDERED BY:  MICA MANZANO     PROCEDURE DATE:  04/24/2023      INTERPRETATION:  MR brain with and without gadolinium    CLINICAL INFORMATION: Brain masses    TECHNIQUE:   Sagittal and axial T1-weighted images, axial FLAIR images,   axial T2-weighted images, axial gradient echo images and axial diffusion   weighted images of the brain were obtained. Following 6.5 cc of Gadavist   administration, 1.0 cc discarded, isotropic volumetric and fast spin echo   T1-weighted images were obtained; this data was reformatted using image   post processing software in multiple imaging planes.    FINDINGS:   CT head dated 04/23/2023 available for review.    Patient motion degrades image quality.    The brain demonstrates multiple enhancing masses within the brain, the   largest ring enhancing mass measuring 2.9 x 2.7 x 3.0 cm in the LEFT   parietal lobe with moderate surrounding edema which demonstrates mass   effect on the posterior horn of the LEFT lateral ventricle. A 1.4 cm   lesion is medially with mild surrounding edema and a 9 mm ring-enhancing   lesion is seen in the RIGHT occipital cortex with minimal edema. 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 No acute cerebral cortical infarct is found.   No   intracranial hemorrhage is recognized.  No mass effect is found in the   brain.    The ventricles, sulci and basal cisterns appear otherwise unremarkable.    The vertebral and internal carotid arteries demonstrate expected flow   voids indicating their patency.    The orbits are unremarkable.  The paranasal sinuses are clear.  The nasal   cavity appears intact.  The nasopharynx is symmetric.  The central skull   base and petrous temporal bones are intact.  The calvarium appears   unremarkable.      IMPRESSION:  Multiple enhancing masses within the brain, the largest ring   enhancing mass measuring 2.9 x 2.7 x 3.0 cm in the LEFT parietal lobe   with moderate surrounding edema which demonstrates mass effect on the   posterior horn of the LEFT lateral ventricle. A 1.4 cm lesion is medially   with mild surrounding edema and a 9 mm ring-enhancing lesion is seen in   the RIGHT occipital cortex with minimal edema. 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        Assessment/Plan: 77y Male with a PMH of ETOH use disorder, no other known PMH as hasn't seen doctors in decades, presents with Right sided weakness/numbness x 2 day, found to have Left parietal mass with vasogenic edema. Per neuro MR brain is c/f metastatic disease. CT chest shows Mediastinal and right hilar lymphadenopathy. Nonspecific clustered nodules in the left lower lobe. IR consulted for mediastinal/hilar LAD biopsy.    - Imaging reviewed with Attending Dr. Halaibeh  - Recommend mediastinal/hilar LAD biopsy by Interventional Pulmonology  - Reconsult as needed

## 2023-04-25 NOTE — PATIENT PROFILE ADULT - FALL HARM RISK - HARM RISK INTERVENTIONS
Assistance with ambulation/Assistance OOB with selected safe patient handling equipment/Communicate Risk of Fall with Harm to all staff/Monitor for mental status changes/Monitor gait and stability/Move patient closer to nurses' station/Provide patient with walking aids - walker, cane, crutches/Reinforce activity limits and safety measures with patient and family/Reorient to person, place and time as needed/Tailored Fall Risk Interventions/Toileting schedule using arm’s reach rule for commode and bathroom/Use of alarms - bed, chair and/or voice tab/Visual Cue: Yellow wristband and red socks/Bed in lowest position, wheels locked, appropriate side rails in place/Call bell, personal items and telephone in reach/Instruct patient to call for assistance before getting out of bed or chair/Non-slip footwear when patient is out of bed/Physically safe environment - no spills, clutter or unnecessary equipment/Purposeful Proactive Rounding Assistance with ambulation/Assistance OOB with selected safe patient handling equipment/Communicate Risk of Fall with Harm to all staff/Monitor for mental status changes/Monitor gait and stability/Move patient closer to nurses' station/Reinforce activity limits and safety measures with patient and family/Reorient to person, place and time as needed/Tailored Fall Risk Interventions/Toileting schedule using arm’s reach rule for commode and bathroom/Use of alarms - bed, chair and/or voice tab/Visual Cue: Yellow wristband and red socks/Bed in lowest position, wheels locked, appropriate side rails in place/Call bell, personal items and telephone in reach/Instruct patient to call for assistance before getting out of bed or chair/Non-slip footwear when patient is out of bed/[place] to call system/Physically safe environment - no spills, clutter or unnecessary equipment/Purposeful Proactive Rounding/Room/bathroom lighting operational, light cord in reach

## 2023-04-25 NOTE — CONSULT NOTE ADULT - SUBJECTIVE AND OBJECTIVE BOX
CHIEF COMPLAINT: Weakness    HPI:  78YO Male active smoker PMH EtOH use disorder and no other known PMH as hasn't seen doctors in decades, who was admitted 4/24 w/ R sided weakness/numbness x 2 days.     He states that he woke up woke up 2 days ago and did not have sensation in his right arm or leg, states he fell over.  Did not want to seek medical attention at that time. He states that right leg function has gradually improved but right arm function is still weak. Since the symptoms didn't get better, he came to ED for further evaluatios. Denies head trauma, LOC, fever/chills, syncope.    He states that he has been drinking a bottle of rum everyday. Denies hx of withdrawal seizures, hallucinations or DTs. No hospitalizations for EtOH withdrawals as per patient. He lives with his sister and is not interested in seeing doctor as outpatient.    In ED, he was afebrile, hemodynamically stable, saturating well on room air. Labs without leukocytosis, no platelet or LFT abnormalities and normal coags. CT head was performed showing Left parietal mass w/ vasogenic edema, no sig MLS. As there was concern for metastasis CT Chest/Abd/Pelvis was performed showing emphysema, mediastinal and hilar adenopathy as well as lumbar and cervical osseous changes which may represent metastatic disease vs degenerative changes. Distal tracheal secretions and LLL nodular opacities were also noted.    Pulmonary consulted for evaluation of Mediastinal and hilar adenopathy.     Since admission pt has been on standing Ativan for prevention of ETOH withdrawal and does have significant ETOH history. Pt has otherwise been stable.     PAST MEDICAL & SURGICAL HISTORY:  Alcohol abuse, daily use  No significant past surgical history    FAMILY HISTORY:  No pertinent family history in first degree relatives        SOCIAL HISTORY:  Smoking: [ ] Never Smoked [ ] Former Smoker (__ packs x ___ years) [ ] Current Smoker  (__ packs x ___ years)  Substance Use: [ ] Never Used [ ] Used ____  EtOH Use:  Marital Status: [ ] Single [ ]  [ ]  [ ]   Sexual History:   Occupation:  Recent Travel:  Country of Birth:  Advance Directives:    Allergies    No Known Allergies    Intolerances        HOME MEDICATIONS:  Home Medications:  Centrum oral tablet: 1 tab(s) orally once a day (24 Apr 2023 12:29)      REVIEW OF SYSTEMS:  Constitutional: [ ] negative [ ] fevers [ ] chills [ ] weight loss [ ] weight gain  HEENT: [ ] negative [ ] dry eyes [ ] eye irritation [ ] postnasal drip [ ] nasal congestion  CV: [ ] negative  [ ] chest pain [ ] orthopnea [ ] palpitations [ ] murmur  Resp: [ ] negative [ ] cough [ ] shortness of breath [ ] dyspnea [ ] wheezing [ ] sputum [ ] hemoptysis  GI: [ ] negative [ ] nausea [ ] vomiting [ ] diarrhea [ ] constipation [ ] abd pain [ ] dysphagia   : [ ] negative [ ] dysuria [ ] nocturia [ ] hematuria [ ] increased urinary frequency  Musculoskeletal: [ ] negative [ ] back pain [ ] myalgias [ ] arthralgias [ ] fracture  Skin: [ ] negative [ ] rash [ ] itch  Neurological: [ ] negative [ ] headache [ ] dizziness [ ] syncope [ ] weakness [ ] numbness  Psychiatric: [ ] negative [ ] anxiety [ ] depression  Endocrine: [ ] negative [ ] diabetes [ ] thyroid problem  Hematologic/Lymphatic: [ ] negative [ ] anemia [ ] bleeding problem  Allergic/Immunologic: [ ] negative [ ] itchy eyes [ ] nasal discharge [ ] hives [ ] angioedema  [ ] All other systems negative  [ ] Unable to assess ROS because ________    OBJECTIVE:  ICU Vital Signs Last 24 Hrs  T(C): 36.7 (25 Apr 2023 11:45), Max: 37.1 (24 Apr 2023 16:33)  T(F): 98 (25 Apr 2023 11:45), Max: 98.7 (24 Apr 2023 16:33)  HR: 77 (25 Apr 2023 11:45) (60 - 80)  BP: 118/62 (25 Apr 2023 11:45) (102/58 - 136/69)  BP(mean): --  ABP: --  ABP(mean): --  RR: 18 (25 Apr 2023 11:45) (18 - 20)  SpO2: 97% (25 Apr 2023 11:45) (96% - 100%)    O2 Parameters below as of 25 Apr 2023 11:45  Patient On (Oxygen Delivery Method): room air              04-25 @ 07:01  -  04-25 @ 13:26  --------------------------------------------------------  IN: 150 mL / OUT: 0 mL / NET: 150 mL      CAPILLARY BLOOD GLUCOSE      POCT Blood Glucose.: 132 mg/dL (23 Apr 2023 19:39)      PHYSICAL EXAM:  General:   HEENT:   Lymph Nodes:  Neck:   Respiratory:   Cardiovascular:   Abdomen:   Extremities:   Skin:   Neurological:  Psychiatry:    LINES:     HOSPITAL MEDICATIONS:  Standing Meds:  dexAMETHasone     Tablet 4 milliGRAM(s) Oral every 6 hours  folic acid 1 milliGRAM(s) Oral daily  levETIRAcetam 500 milliGRAM(s) Oral two times a day  multivitamin 1 Tablet(s) Oral daily  nicotine -   7 mG/24Hr(s) Patch 1 Patch Transdermal daily  thiamine IVPB 500 milliGRAM(s) IV Intermittent daily      PRN Meds:  acetaminophen     Tablet .. 650 milliGRAM(s) Oral every 6 hours PRN  aluminum hydroxide/magnesium hydroxide/simethicone Suspension 30 milliLiter(s) Oral every 4 hours PRN  LORazepam     Tablet 1 milliGRAM(s) Oral every 6 hours PRN  melatonin 3 milliGRAM(s) Oral at bedtime PRN  ondansetron Injectable 4 milliGRAM(s) IV Push every 8 hours PRN      LABS:                        12.8   6.79  )-----------( 167      ( 24 Apr 2023 04:10 )             36.4     Hgb Trend: 12.8<--, 13.0<--  04-24    140  |  106  |  16  ----------------------------<  115<H>  3.9   |  24  |  1.20    Ca    8.6      24 Apr 2023 04:10    TPro  6.8  /  Alb  3.7  /  TBili  0.5  /  DBili  x   /  AST  26  /  ALT  13  /  AlkPhos  86  04-24    Creatinine Trend: 1.20<--, 1.30<--  PT/INR - ( 24 Apr 2023 04:10 )   PT: 13.1 sec;   INR: 1.13 ratio         PTT - ( 24 Apr 2023 04:10 )  PTT:30.7 sec          MICROBIOLOGY:       RADIOLOGY:  [ ] Reviewed and interpreted by me    PULMONARY FUNCTION TESTS:    EKG: CHIEF COMPLAINT: Weakness    HPI:  76YO Male active smoker PMH EtOH use disorder and no other known PMH as hasn't seen doctors in decades, who was admitted 4/24 w/ R sided weakness/numbness x 2 days.     He states that he woke up woke up 2 days ago and did not have sensation in his right arm or leg, states he fell over.  Did not want to seek medical attention at that time. He states that right leg function has gradually improved but right arm function is still weak. Since the symptoms didn't get better, he came to ED for further evaluatios. Denies head trauma, LOC, fever/chills, syncope.    He states that he has been drinking a bottle of rum everyday. Denies hx of withdrawal seizures, hallucinations or DTs. No hospitalizations for EtOH withdrawals as per patient. He lives with his sister and is not interested in seeing doctor as outpatient.    In ED, he was afebrile, hemodynamically stable, saturating well on room air. Labs without leukocytosis, no platelet or LFT abnormalities and normal coags. CT head was performed showing Left parietal mass w/ vasogenic edema, no sig MLS. As there was concern for metastasis CT Chest/Abd/Pelvis was performed showing emphysema, mediastinal and hilar adenopathy as well as lumbar and cervical osseous changes which may represent metastatic disease vs degenerative changes. Distal tracheal secretions and LLL nodular opacities were also noted.    Pulmonary consulted for evaluation of Mediastinal and hilar adenopathy.     Since admission pt has been on standing Ativan for prevention of ETOH withdrawal and does have significant ETOH history. Pt has otherwise been stable.     PAST MEDICAL & SURGICAL HISTORY:  Alcohol abuse, daily use  No significant past surgical history    FAMILY HISTORY:  No pertinent family history in first degree relatives        SOCIAL HISTORY:  Smoking: [ ] Never Smoked [ ] Former Smoker (__ packs x ___ years) [x ] Current Smoker  (__ packs x ___ years)  Substance Use: [ ] Never Used [ ] Used ____  EtOH Use:  Marital Status: [ ] Single [ ]  [ ]  [ ]   Sexual History:   Occupation:  Recent Travel:  Country of Birth:  Advance Directives:    Allergies    No Known Allergies    Intolerances        HOME MEDICATIONS:  Home Medications:  Centrum oral tablet: 1 tab(s) orally once a day (24 Apr 2023 12:29)      REVIEW OF SYSTEMS:  [x ] Unable to assess ROS because lethargic post ativan    OBJECTIVE:  ICU Vital Signs Last 24 Hrs  T(C): 36.7 (25 Apr 2023 11:45), Max: 37.1 (24 Apr 2023 16:33)  T(F): 98 (25 Apr 2023 11:45), Max: 98.7 (24 Apr 2023 16:33)  HR: 77 (25 Apr 2023 11:45) (60 - 80)  BP: 118/62 (25 Apr 2023 11:45) (102/58 - 136/69)  BP(mean): --  ABP: --  ABP(mean): --  RR: 18 (25 Apr 2023 11:45) (18 - 20)  SpO2: 97% (25 Apr 2023 11:45) (96% - 100%)    O2 Parameters below as of 25 Apr 2023 11:45  Patient On (Oxygen Delivery Method): room air              04-25 @ 07:01  -  04-25 @ 13:26  --------------------------------------------------------  IN: 150 mL / OUT: 0 mL / NET: 150 mL      CAPILLARY BLOOD GLUCOSE      POCT Blood Glucose.: 132 mg/dL (23 Apr 2023 19:39)      PHYSICAL EXAM:  General: Male laying in bed, somnolent but arousable  Respiratory: CTA bilaterally  Cardiovascular: Regular rate and rhythm no m/r/g  Abdomen: Nontender nondistended  Extremities: No peripheral edema  Neurological: Somnolent but arrousable    LINES:     HOSPITAL MEDICATIONS:  Standing Meds:  dexAMETHasone     Tablet 4 milliGRAM(s) Oral every 6 hours  folic acid 1 milliGRAM(s) Oral daily  levETIRAcetam 500 milliGRAM(s) Oral two times a day  multivitamin 1 Tablet(s) Oral daily  nicotine -   7 mG/24Hr(s) Patch 1 Patch Transdermal daily  thiamine IVPB 500 milliGRAM(s) IV Intermittent daily      PRN Meds:  acetaminophen     Tablet .. 650 milliGRAM(s) Oral every 6 hours PRN  aluminum hydroxide/magnesium hydroxide/simethicone Suspension 30 milliLiter(s) Oral every 4 hours PRN  LORazepam     Tablet 1 milliGRAM(s) Oral every 6 hours PRN  melatonin 3 milliGRAM(s) Oral at bedtime PRN  ondansetron Injectable 4 milliGRAM(s) IV Push every 8 hours PRN      LABS:                        12.8   6.79  )-----------( 167      ( 24 Apr 2023 04:10 )             36.4     Hgb Trend: 12.8<--, 13.0<--  04-24    140  |  106  |  16  ----------------------------<  115<H>  3.9   |  24  |  1.20    Ca    8.6      24 Apr 2023 04:10    TPro  6.8  /  Alb  3.7  /  TBili  0.5  /  DBili  x   /  AST  26  /  ALT  13  /  AlkPhos  86  04-24    Creatinine Trend: 1.20<--, 1.30<--  PT/INR - ( 24 Apr 2023 04:10 )   PT: 13.1 sec;   INR: 1.13 ratio         PTT - ( 24 Apr 2023 04:10 )  PTT:30.7 sec          MICROBIOLOGY:       RADIOLOGY:  [ ] Reviewed and interpreted by me    PULMONARY FUNCTION TESTS:    EKG:

## 2023-04-25 NOTE — PROGRESS NOTE ADULT - PROBLEM SELECTOR PLAN 1
R sided weakness/numbness x 2 day, found to have Left parietal mass w/ vasogenic edema  - CTA neck with Left vertebral stenosis but good basilar filling.  - appreciate NSG recs: recommend MRI  - MRI ordered; pt would need to think about it  - continue decadron 4mg q6hr  - continue keppra 500mg BID  - pt currently refusing any surgical intervention    - CT chest/abd/pelv without signs of other malignancy or mets except lymphdenopathy R sided weakness/numbness x 2 day, found to have Left parietal mass w/ vasogenic edema  - CTA neck with Left vertebral stenosis but good basilar filling.  - appreciate NSG recs: recommend MRI  MRI head with multiple masses, concerning for mets  CT chest/abd/pelv without signs of other malignancy, does have hilar lymphadenopathy   - pulm consulted for biopsy of lymph node   - will need onc consult eventually   - continue decadron 4mg q6hr  - continue keppra 500mg BID

## 2023-04-25 NOTE — PROGRESS NOTE ADULT - ATTENDING COMMENTS
This is a 77M with EtOH use disorder, hasn't seen doctors in decades, presents with R sided weakness/numbness x 2 day and AMS. MAR brain shows multiple enhancing masses within the brain, the largest ring enhancing mass measuring 2.9 x 2.7 x 3.0 cm in the LEFT parietal lobe with moderate surrounding edema which demonstrates mass effect on the posterior horn of the LEFT lateral ventricle. A 1.4 cm lesion is medially with mild surrounding edema and a 9 mm ring-enhancing lesion is seen in the RIGHT occipital cortex with minimal edema. 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    1. 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 Mediastinal and hilar adenopathy  3. Alcohol use    - Afebrile, VSS, still encephalopathic, noted RUE mild weakness, no s/s of ETOH withdrawal  - Reviewed MRI brain and CT c/a/p personally- spoke to NeuroSx- no plans for brain biopsy but rec- Pul or IR for tissue biopsy and started on Decadron 4mg q 6 hrs, AED for seizure ppx  - IR input noted- rec Pulmonary for mediastinal/Hilar LN biopsy, will call Pulmonary  - May need more info from family  - Oncology input  - Neurocheck q 4 hrs

## 2023-04-25 NOTE — PROGRESS NOTE ADULT - SUBJECTIVE AND OBJECTIVE BOX
PATIENT: MARY JARVIS, MRN: [id number]    CHIEF COMPLAINT: Patient is a 77y old  Male who presents with a chief complaint of R sided weakness/numbness (24 Apr 2023 12:41)      INTERVAL HISTORY/OVERNIGHT EVENTS: No overnight events.      MEDICATIONS:  MEDICATIONS  (STANDING):  dexAMETHasone     Tablet 4 milliGRAM(s) Oral every 6 hours  folic acid 1 milliGRAM(s) Oral daily  levETIRAcetam 500 milliGRAM(s) Oral two times a day  LORazepam     Tablet   Oral   LORazepam     Tablet 1.5 milliGRAM(s) Oral every 4 hours  LORazepam     Tablet 2 milliGRAM(s) Oral every 4 hours  multivitamin 1 Tablet(s) Oral daily  nicotine -   7 mG/24Hr(s) Patch 1 Patch Transdermal daily  thiamine IVPB 500 milliGRAM(s) IV Intermittent daily    MEDICATIONS  (PRN):  acetaminophen     Tablet .. 650 milliGRAM(s) Oral every 6 hours PRN Temp greater or equal to 38C (100.4F), Mild Pain (1 - 3)  aluminum hydroxide/magnesium hydroxide/simethicone Suspension 30 milliLiter(s) Oral every 4 hours PRN Dyspepsia  LORazepam   Injectable 2 milliGRAM(s) IV Push every 2 hours PRN Symptom-triggered: 2 point increase in CIWA -Ar score and a total score of 7 or LESS  melatonin 3 milliGRAM(s) Oral at bedtime PRN Insomnia  ondansetron Injectable 4 milliGRAM(s) IV Push every 8 hours PRN Nausea and/or Vomiting      ALLERGIES: Allergies    No Known Allergies    Intolerances        OBJECTIVE:  ICU Vital Signs Last 24 Hrs  T(C): 36.6 (25 Apr 2023 04:20), Max: 37.1 (24 Apr 2023 16:33)  T(F): 97.9 (25 Apr 2023 04:20), Max: 98.7 (24 Apr 2023 16:33)  HR: 60 (25 Apr 2023 04:20) (60 - 80)  BP: 136/69 (25 Apr 2023 04:20) (111/74 - 136/69)  BP(mean): --  ABP: --  ABP(mean): --  RR: 18 (25 Apr 2023 04:20) (18 - 20)  SpO2: 96% (25 Apr 2023 04:20) (96% - 100%)    O2 Parameters below as of 25 Apr 2023 04:20  Patient On (Oxygen Delivery Method): room air            Adult Advanced Hemodynamics Last 24 Hrs  CVP(mm Hg): --  CVP(cm H2O): --  CO: --  CI: --  PA: --  PA(mean): --  PCWP: --  SVR: --  SVRI: --  PVR: --  PVRI: --  CAPILLARY BLOOD GLUCOSE        CAPILLARY BLOOD GLUCOSE      POCT Blood Glucose.: 132 mg/dL (23 Apr 2023 19:39)    I&O's Summary    Daily     Daily     PHYSICAL EXAMINATION:  GENERAL: NAD, well-developed  HEAD:  Atraumatic, Normocephalic  EYES: EOMI, PERRLA, conjunctiva and sclera clear  MOUTH: no oral thrush  NECK: Supple, No JVD  CHEST/LUNG: Clear to auscultation bilaterally; No wheeze  HEART: Regular rate and rhythm; No murmurs, rubs, or gallops  ABDOMEN: Soft, Nontender, Nondistended; Bowel sounds present  EXTREMITIES:  2+ Peripheral Pulses, No clubbing, cyanosis, or edema  NEUROLOGY: AAOx3, RUE strength 4/5, RLE strength 4/5, sensation intact, (+) R drift  SKIN: No rashes or lesions    LABS:                          12.8   6.79  )-----------( 167      ( 24 Apr 2023 04:10 )             36.4     04-24    140  |  106  |  16  ----------------------------<  115<H>  3.9   |  24  |  1.20    Ca    8.6      24 Apr 2023 04:10    TPro  6.8  /  Alb  3.7  /  TBili  0.5  /  DBili  x   /  AST  26  /  ALT  13  /  AlkPhos  86  04-24    LIVER FUNCTIONS - ( 24 Apr 2023 04:10 )  Alb: 3.7 g/dL / Pro: 6.8 g/dL / ALK PHOS: 86 U/L / ALT: 13 U/L / AST: 26 U/L / GGT: x           PT/INR - ( 24 Apr 2023 04:10 )   PT: 13.1 sec;   INR: 1.13 ratio         PTT - ( 24 Apr 2023 04:10 )  PTT:30.7 sec       PATIENT: MARY JARVIS, MRN: [id number]    CHIEF COMPLAINT: Patient is a 77y old  Male who presents with a chief complaint of R sided weakness/numbness (24 Apr 2023 12:41)      INTERVAL HISTORY/OVERNIGHT EVENTS: Patient appears lethargic this morning, had been started on ativan taper on admission -likely oversedated. When seen 2 hours later, patient is following commands however, is AOx1. Seems confused, per sister he does have trouble remembering things at baseline and reports that he has always been like this. Independent at home, lives alone and last time sister saw him (2 weeks ago), he was at baseline.       MEDICATIONS:  MEDICATIONS  (STANDING):  dexAMETHasone     Tablet 4 milliGRAM(s) Oral every 6 hours  folic acid 1 milliGRAM(s) Oral daily  levETIRAcetam 500 milliGRAM(s) Oral two times a day  LORazepam     Tablet   Oral   LORazepam     Tablet 1.5 milliGRAM(s) Oral every 4 hours  LORazepam     Tablet 2 milliGRAM(s) Oral every 4 hours  multivitamin 1 Tablet(s) Oral daily  nicotine -   7 mG/24Hr(s) Patch 1 Patch Transdermal daily  thiamine IVPB 500 milliGRAM(s) IV Intermittent daily    MEDICATIONS  (PRN):  acetaminophen     Tablet .. 650 milliGRAM(s) Oral every 6 hours PRN Temp greater or equal to 38C (100.4F), Mild Pain (1 - 3)  aluminum hydroxide/magnesium hydroxide/simethicone Suspension 30 milliLiter(s) Oral every 4 hours PRN Dyspepsia  LORazepam   Injectable 2 milliGRAM(s) IV Push every 2 hours PRN Symptom-triggered: 2 point increase in CIWA -Ar score and a total score of 7 or LESS  melatonin 3 milliGRAM(s) Oral at bedtime PRN Insomnia  ondansetron Injectable 4 milliGRAM(s) IV Push every 8 hours PRN Nausea and/or Vomiting      ALLERGIES: Allergies    No Known Allergies    Intolerances        OBJECTIVE:  ICU Vital Signs Last 24 Hrs  T(C): 36.6 (25 Apr 2023 04:20), Max: 37.1 (24 Apr 2023 16:33)  T(F): 97.9 (25 Apr 2023 04:20), Max: 98.7 (24 Apr 2023 16:33)  HR: 60 (25 Apr 2023 04:20) (60 - 80)  BP: 136/69 (25 Apr 2023 04:20) (111/74 - 136/69)  BP(mean): --  ABP: --  ABP(mean): --  RR: 18 (25 Apr 2023 04:20) (18 - 20)  SpO2: 96% (25 Apr 2023 04:20) (96% - 100%)    O2 Parameters below as of 25 Apr 2023 04:20  Patient On (Oxygen Delivery Method): room air            Adult Advanced Hemodynamics Last 24 Hrs  CVP(mm Hg): --  CVP(cm H2O): --  CO: --  CI: --  PA: --  PA(mean): --  PCWP: --  SVR: --  SVRI: --  PVR: --  PVRI: --  CAPILLARY BLOOD GLUCOSE        CAPILLARY BLOOD GLUCOSE      POCT Blood Glucose.: 132 mg/dL (23 Apr 2023 19:39)    I&O's Summary    Daily     Daily     PHYSICAL EXAMINATION:  GENERAL: NAD, well-developed  HEAD:  Atraumatic, Normocephalic  EYES: EOMI, PERRLA, conjunctiva and sclera clear  MOUTH: no oral thrush  NECK: Supple, No JVD  CHEST/LUNG: Clear to auscultation bilaterally; No wheeze  HEART: Regular rate and rhythm; No murmurs, rubs, or gallops  ABDOMEN: Soft, Nontender, Nondistended; Bowel sounds present  EXTREMITIES:  2+ Peripheral Pulses, No clubbing, cyanosis, or edema  NEUROLOGY: AAOx3, RUE and LE 5/5 strength, no deficits on exam   SKIN: No rashes or lesions    LABS:                          12.8   6.79  )-----------( 167      ( 24 Apr 2023 04:10 )             36.4     04-24    140  |  106  |  16  ----------------------------<  115<H>  3.9   |  24  |  1.20    Ca    8.6      24 Apr 2023 04:10    TPro  6.8  /  Alb  3.7  /  TBili  0.5  /  DBili  x   /  AST  26  /  ALT  13  /  AlkPhos  86  04-24    LIVER FUNCTIONS - ( 24 Apr 2023 04:10 )  Alb: 3.7 g/dL / Pro: 6.8 g/dL / ALK PHOS: 86 U/L / ALT: 13 U/L / AST: 26 U/L / GGT: x           PT/INR - ( 24 Apr 2023 04:10 )   PT: 13.1 sec;   INR: 1.13 ratio         PTT - ( 24 Apr 2023 04:10 )  PTT:30.7 sec

## 2023-04-26 NOTE — DISCHARGE NOTE PROVIDER - HOSPITAL COURSE
77M with EtOH use disorder, no known PMHx as hasn't seen doctors in decades, presents with R sided weakness/numbness x 2 day after being found down by a friend, found to have left parietal mass w/ vasogenic edema admitted for further eval.    Hospital course:  Vitals WNL, patient did not remember last drink. He was started on ativan taper c/b oversedation and was taken off. MRI of brain with multiple enhancing lesions with surrounding vasogenic edema. Neurosurgery consulted, pt was started on decadron and keppra seizure ppx. CT CAP with Mediastinal and right hilar lymphadenopathy, nonspecific clustered nodules in the left lower lobe. Pulm was consulted for biopsy of lymph node. D/w sister HCP about further plans     Seen by PT who recommended HO for balance/coordination issues. Pt deemed medically stable for discharge. 77M with EtOH use disorder, no known PMHx as hasn't seen doctors in decades, presents with R sided weakness/numbness x 2 day after being found down by a friend, found to have left parietal mass w/ vasogenic edema admitted for further eval.    Hospital course:  Vitals WNL, patient did not remember last drink. He was started on ativan taper c/b oversedation and was taken off. MRI of brain with multiple enhancing lesions with surrounding vasogenic edema. Neurosurgery consulted, pt was started on decadron and keppra seizure ppx. CT CAP with Mediastinal and right hilar lymphadenopathy, nonspecific clustered nodules in the left lower lobe. GOC discussion with palliative and ethics, pt surrogate decision maker is spouse, wanted to pursue biopsy as patient without capacity. Pulm was consulted for biopsy of lymph node, EBUS performed 5/5.     Oncology was consulted    Seen by PT who recommended HO for balance/coordination issues. Pt deemed medically stable for discharge. 77M smoker with PMH of alcohol use disorder and dementia (A&Ox1-2 at baseline) with poor medical follow up presented after being found down by a friend. A/w right sided weakness and numbness, found to have brain mets CT head. MRI of brain with multiple enhancing lesions with surrounding vasogenic edema. Neurosurgery was consulted and recommended dexamethasone and keppra with plans for radiation down the line. CT scan showed mediastinal and right hilar lymphadenopathy and nonspecific clustered nodules in the left lower lobe. Patient underwent EBUS biopsy on 5/5, which showed lung adenocarcinoma. Oncology was consulted and recommended Rad onc consult. Rad onc recommended transfer to [facility] for brain radiation.     Patient hemodynamically stable for discharge to [facility]. During hospitalization, psych was consulted for patient's capacity and it was determined that patient's sister Michelle will be his HCP and aide in medical decision making. During hospitalization, R-sided deficits improved and dexamethasone was started to be tapered on 5/11.     PT recommended HO after discharge. Patient currently lives alone so further discussions regarding LTC facility vs safer living arrangement will need to be discussed prior to discharge.

## 2023-04-26 NOTE — DISCHARGE NOTE PROVIDER - NSDCMRMEDTOKEN_GEN_ALL_CORE_FT
Centrum oral tablet: 1 tab(s) orally once a day   acetaminophen 325 mg oral tablet: 2 tab(s) orally every 6 hours As needed Temp greater or equal to 38C (100.4F), Mild Pain (1 - 3)  budesonide-formoterol 80 mcg-4.5 mcg/inh inhalation aerosol: 2 puff(s) inhaled 2 times a day  dexAMETHasone 2 mg oral tablet: 1 tab(s) orally every 8 hours  folic acid 1 mg oral tablet: 1 tab(s) orally once a day  levETIRAcetam 500 mg oral tablet: 1 tab(s) orally 2 times a day  Multiple Vitamins oral tablet: 1 tab(s) orally once a day  nicotine 7 mg/24 hr transdermal film, extended release: 1 patch transdermal once a day  pantoprazole 40 mg oral delayed release tablet: 1 tab(s) orally once a day (before a meal)  tiotropium 2.5 mcg/inh inhalation aerosol: 2 puff(s) inhaled once a day

## 2023-04-26 NOTE — PHYSICAL THERAPY INITIAL EVALUATION ADULT - TRANSFER TRAINING, PT EVAL
GOAL: Patient will perform sit to stand transfers with supervision with least restrictive assistive device with proper hand placement in 2 weeks.

## 2023-04-26 NOTE — DISCHARGE NOTE PROVIDER - NSFOLLOWUPCLINICS_GEN_ALL_ED_FT
[facility] Medicine Specialties at [place]  Internal Medicine  [address] [place] [address]  Phone: [phone]  Fax: [phone]  Follow Up Time: 1 week    [facility]  Hematology/Oncology  [address] [place] [id number]  Phone: [phone]  Fax:   Follow Up Time: 2 weeks    [facility] Neurosurgery  Neurosurgery  Referral Assistance Program  [place]   Phone:   Fax:   Follow Up Time: 2 weeks

## 2023-04-26 NOTE — PHYSICAL THERAPY INITIAL EVALUATION ADULT - PERTINENT HX OF CURRENT PROBLEM, REHAB EVAL
77M with EtOH use disorder, no known PMHx as hasn't seen doctors in decades, presents with R sided weakness/numbness x 2 days. He states that he woke up woke up 2 days ago and did not have sensation in his right arm or leg, states he fell over.  Did not want to seek medical attention at that time. He states that right leg function has gradually improved but right arm function is still weak. Since the symptoms didn't get better, he came to ED for further evaluations. Denies head trauma, LOC, fever/chills, syncope.  He states that he has been drinking a bottle of rum everyday and smokes 4-5 cigarettes/day forever. Denies hx of withdrawal seizures, hallucinations or DTs. No hospitalizations for EtOH withdrawals as per patient. He lives with his sister and is not interested in seeing doctor as outpatient. Per neuro MR brain is c/f metastatic disease. CT chest shows Mediastinal and right hilar lymphadenopathy. Nonspecific clustered nodules in the left lower lobe. IR consulted for mediastinal/hilar LAD biopsy. 77M with EtOH use disorder, no known PMHx as hasn't seen doctors in decades, presents with R sided weakness/numbness x 2 days. He states that he woke up woke up 2 days ago and did not have sensation in his right arm or leg, states he fell over.  Did not want to seek medical attention at that time. He states that right leg function has gradually improved but right arm function is still weak. Since the symptoms didn't get better, he came to ED for further evaluations. Denies head trauma, LOC, fever/chills, syncope.  He states that he has been drinking a bottle of rum everyday and smokes 4-5 cigarettes/day forever. Denies hx of withdrawal seizures, hallucinations or DTs. No hospitalizations for EtOH withdrawals as per patient. He lives with his sister and is not interested in seeing doctor as outpatient. Per neuro MR brain is c/f metastatic disease. CT chest shows Mediastinal and right hilar lymphadenopathy. Nonspecific clustered nodules in the left lower lobe. IR consulted for mediastinal/hilar LAD biopsy.  4/23 CT Head: Left frontal parietal mass with marked vasogenic edema. Further evaluation with contrast-enhanced MRI is recommended. 4/23 CTA Head/Neck: Left vertebral artery is not visualized until approximately C4 level, and is diminutive in size and attenuated, likely related to occlusion/severe stenosis. No CTA evidence of aneurysm, or dissection. 4/24 CT A/P/Chest: Mediastinal and right hilar lymphadenopathy. Nonspecific clustered nodules in the left lower lobe. Otherwise no evidence for metastatic disease within the chest or abdomen. Heterogeneous vertebral body appearance which could be due to underlying degenerative changes. Correlation with nuclear medicine bone scan may be of benefit. MR Brain Stereotactic: Multiple enhancing masses within the brain, the largest ring enhancing mass measuring 2.9 x 2.7 x 3.0 cm in the LEFT parietal lobe with moderate surrounding edema which demonstrates mass effect on the posterior horn of the LEFT lateral ventricle. A 1.4 cm lesion is medially with mild surrounding edema and a 9 mm ring-enhancing lesion is seen in the RIGHT occipital cortex with minimal edema. 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

## 2023-04-26 NOTE — DISCHARGE NOTE PROVIDER - NSDCCPCAREPLAN_GEN_ALL_CORE_FT
PRINCIPAL DISCHARGE DIAGNOSIS  Diagnosis: Brain mass  Assessment and Plan of Treatment: You were found to have multiple brain masses, concerning for metastatic (spreading) cancer. You were started on steroids to decrease inflammation and anti-seizure medications. Your CT scan of the abdomen/pelvis was negative for primary source. CT chest did show enlarged lymph nodes for which biopsy was performed to obtain tissue diagnosis. Neurosurgery saw you and recommended outpatient radiation to the brain. You were also seen by the cancer doctors, however, diagnosis is necessary to guide your treatment.

## 2023-04-26 NOTE — PROGRESS NOTE ADULT - PROBLEM SELECTOR PLAN 1
R sided weakness/numbness x 2 day, found to have Left parietal mass w/ vasogenic edema  - CTA neck with Left vertebral stenosis but good basilar filling.  - appreciate NSG recs: recommend MRI  MRI head with multiple masses, concerning for mets  CT chest/abd/pelv without signs of other malignancy, does have hilar lymphadenopathy   - pulm consulted for biopsy of lymph node   - will need onc consult eventually   - continue decadron 4mg q6hr  - continue keppra 500mg BID R sided weakness/numbness x 2 day, found to have Left parietal mass w/ vasogenic edema  - CTA neck with Left vertebral stenosis but good basilar filling.  - appreciate NSG recs: recommend MRI  MRI head with multiple masses, concerning for mets  CT chest/abd/pelv without signs of other malignancy, does have hilar lymphadenopathy   - pulm consulted for biopsy of lymph node, d/w sister about ultimate goals    - will need onc consult eventually   - continue decadron 4mg q6hr  - continue keppra 500mg BID

## 2023-04-26 NOTE — PROGRESS NOTE ADULT - SUBJECTIVE AND OBJECTIVE BOX
PATIENT: MARY JARVIS, MRN: [id number]    CHIEF COMPLAINT: Patient is a 77y old  Male who presents with a chief complaint of R sided weakness/numbness  Brain mets (25 Apr 2023 16:28)      INTERVAL HISTORY/OVERNIGHT EVENTS: No overnight events.     MEDICATIONS:  MEDICATIONS  (STANDING):  dexAMETHasone     Tablet 4 milliGRAM(s) Oral every 6 hours  folic acid 1 milliGRAM(s) Oral daily  levETIRAcetam 500 milliGRAM(s) Oral two times a day  multivitamin 1 Tablet(s) Oral daily  nicotine -   7 mG/24Hr(s) Patch 1 Patch Transdermal daily  thiamine IVPB 500 milliGRAM(s) IV Intermittent daily    MEDICATIONS  (PRN):  acetaminophen     Tablet .. 650 milliGRAM(s) Oral every 6 hours PRN Temp greater or equal to 38C (100.4F), Mild Pain (1 - 3)  aluminum hydroxide/magnesium hydroxide/simethicone Suspension 30 milliLiter(s) Oral every 4 hours PRN Dyspepsia  melatonin 3 milliGRAM(s) Oral at bedtime PRN Insomnia  ondansetron Injectable 4 milliGRAM(s) IV Push every 8 hours PRN Nausea and/or Vomiting      ALLERGIES: Allergies    No Known Allergies    Intolerances        OBJECTIVE:  ICU Vital Signs Last 24 Hrs  T(C): 36.5 (26 Apr 2023 05:55), Max: 36.8 (25 Apr 2023 07:43)  T(F): 97.7 (26 Apr 2023 05:55), Max: 98.2 (25 Apr 2023 07:43)  HR: 56 (26 Apr 2023 05:55) (55 - 95)  BP: 131/78 (26 Apr 2023 05:55) (102/58 - 135/59)  BP(mean): --  ABP: --  ABP(mean): --  RR: 18 (26 Apr 2023 05:55) (18 - 18)  SpO2: 97% (26 Apr 2023 05:55) (96% - 98%)    O2 Parameters below as of 26 Apr 2023 05:55  Patient On (Oxygen Delivery Method): room air            Adult Advanced Hemodynamics Last 24 Hrs  CVP(mm Hg): --  CVP(cm H2O): --  CO: --  CI: --  PA: --  PA(mean): --  PCWP: --  SVR: --  SVRI: --  PVR: --  PVRI: --  CAPILLARY BLOOD GLUCOSE        CAPILLARY BLOOD GLUCOSE        I&O's Summary    25 Apr 2023 07:01  -  26 Apr 2023 07:00  --------------------------------------------------------  IN: 250 mL / OUT: 0 mL / NET: 250 mL      Daily     Daily     PHYSICAL EXAMINATION:  GENERAL: NAD, well-developed  HEAD:  Atraumatic, Normocephalic  EYES: EOMI, PERRLA, conjunctiva and sclera clear  MOUTH: no oral thrush  NECK: Supple, No JVD  CHEST/LUNG: Clear to auscultation bilaterally; No wheeze  HEART: Regular rate and rhythm; No murmurs, rubs, or gallops  ABDOMEN: Soft, Nontender, Nondistended; Bowel sounds present  EXTREMITIES:  2+ Peripheral Pulses, No clubbing, cyanosis, or edema  NEUROLOGY: AAOx1, RUE and LE 5/5 strength, no deficits on exam   SKIN: No rashes or lesions      LABS:                   PATIENT: MARY JARVIS, MRN: [id number]    CHIEF COMPLAINT: Patient is a 77y old  Male who presents with a chief complaint of R sided weakness/numbness  Brain mets (25 Apr 2023 16:28)      INTERVAL HISTORY/OVERNIGHT EVENTS: No overnight events. Did not require anything for agitation.     MEDICATIONS:  MEDICATIONS  (STANDING):  dexAMETHasone     Tablet 4 milliGRAM(s) Oral every 6 hours  folic acid 1 milliGRAM(s) Oral daily  levETIRAcetam 500 milliGRAM(s) Oral two times a day  multivitamin 1 Tablet(s) Oral daily  nicotine -   7 mG/24Hr(s) Patch 1 Patch Transdermal daily  thiamine IVPB 500 milliGRAM(s) IV Intermittent daily    MEDICATIONS  (PRN):  acetaminophen     Tablet .. 650 milliGRAM(s) Oral every 6 hours PRN Temp greater or equal to 38C (100.4F), Mild Pain (1 - 3)  aluminum hydroxide/magnesium hydroxide/simethicone Suspension 30 milliLiter(s) Oral every 4 hours PRN Dyspepsia  melatonin 3 milliGRAM(s) Oral at bedtime PRN Insomnia  ondansetron Injectable 4 milliGRAM(s) IV Push every 8 hours PRN Nausea and/or Vomiting      ALLERGIES: Allergies    No Known Allergies    Intolerances        OBJECTIVE:  ICU Vital Signs Last 24 Hrs  T(C): 36.5 (26 Apr 2023 05:55), Max: 36.8 (25 Apr 2023 07:43)  T(F): 97.7 (26 Apr 2023 05:55), Max: 98.2 (25 Apr 2023 07:43)  HR: 56 (26 Apr 2023 05:55) (55 - 95)  BP: 131/78 (26 Apr 2023 05:55) (102/58 - 135/59)  BP(mean): --  ABP: --  ABP(mean): --  RR: 18 (26 Apr 2023 05:55) (18 - 18)  SpO2: 97% (26 Apr 2023 05:55) (96% - 98%)    O2 Parameters below as of 26 Apr 2023 05:55  Patient On (Oxygen Delivery Method): room air            Adult Advanced Hemodynamics Last 24 Hrs  CVP(mm Hg): --  CVP(cm H2O): --  CO: --  CI: --  PA: --  PA(mean): --  PCWP: --  SVR: --  SVRI: --  PVR: --  PVRI: --  CAPILLARY BLOOD GLUCOSE        CAPILLARY BLOOD GLUCOSE        I&O's Summary    25 Apr 2023 07:01  -  26 Apr 2023 07:00  --------------------------------------------------------  IN: 250 mL / OUT: 0 mL / NET: 250 mL      Daily     Daily     PHYSICAL EXAMINATION:  GENERAL: NAD, well-developed  HEAD:  Atraumatic, Normocephalic  EYES: EOMI, PERRLA, conjunctiva and sclera clear  MOUTH: no oral thrush  NECK: Supple, No JVD  CHEST/LUNG: Clear to auscultation bilaterally; No wheeze  HEART: Regular rate and rhythm; No murmurs, rubs, or gallops  ABDOMEN: Soft, Nontender, Nondistended; Bowel sounds present  EXTREMITIES:  2+ Peripheral Pulses, No clubbing, cyanosis, or edema  NEUROLOGY: AAOx1, RUE and LE 5/5 strength, no deficits on exam   SKIN: No rashes or lesions      LABS:                   PATIENT: MARY JARVIS, MRN: [id number]    CHIEF COMPLAINT: Patient is a 77y old  Male who presents with a chief complaint of R sided weakness/numbness  Brain mets (25 Apr 2023 16:28)      INTERVAL HISTORY/OVERNIGHT EVENTS: No overnight events. Did not require anything for agitation. Patient appears lethargic in the morning, more awake in the afternoon. Patient sister at bedside, was informed of updates and ultimate plans for patient regarding potential biopsy.     MEDICATIONS:  MEDICATIONS  (STANDING):  dexAMETHasone     Tablet 4 milliGRAM(s) Oral every 6 hours  folic acid 1 milliGRAM(s) Oral daily  levETIRAcetam 500 milliGRAM(s) Oral two times a day  multivitamin 1 Tablet(s) Oral daily  nicotine -   7 mG/24Hr(s) Patch 1 Patch Transdermal daily  thiamine IVPB 500 milliGRAM(s) IV Intermittent daily    MEDICATIONS  (PRN):  acetaminophen     Tablet .. 650 milliGRAM(s) Oral every 6 hours PRN Temp greater or equal to 38C (100.4F), Mild Pain (1 - 3)  aluminum hydroxide/magnesium hydroxide/simethicone Suspension 30 milliLiter(s) Oral every 4 hours PRN Dyspepsia  melatonin 3 milliGRAM(s) Oral at bedtime PRN Insomnia  ondansetron Injectable 4 milliGRAM(s) IV Push every 8 hours PRN Nausea and/or Vomiting      ALLERGIES: Allergies    No Known Allergies    Intolerances        OBJECTIVE:  ICU Vital Signs Last 24 Hrs  T(C): 36.5 (26 Apr 2023 05:55), Max: 36.8 (25 Apr 2023 07:43)  T(F): 97.7 (26 Apr 2023 05:55), Max: 98.2 (25 Apr 2023 07:43)  HR: 56 (26 Apr 2023 05:55) (55 - 95)  BP: 131/78 (26 Apr 2023 05:55) (102/58 - 135/59)  BP(mean): --  ABP: --  ABP(mean): --  RR: 18 (26 Apr 2023 05:55) (18 - 18)  SpO2: 97% (26 Apr 2023 05:55) (96% - 98%)    O2 Parameters below as of 26 Apr 2023 05:55  Patient On (Oxygen Delivery Method): room air            Adult Advanced Hemodynamics Last 24 Hrs  CVP(mm Hg): --  CVP(cm H2O): --  CO: --  CI: --  PA: --  PA(mean): --  PCWP: --  SVR: --  SVRI: --  PVR: --  PVRI: --  CAPILLARY BLOOD GLUCOSE        CAPILLARY BLOOD GLUCOSE        I&O's Summary    25 Apr 2023 07:01  -  26 Apr 2023 07:00  --------------------------------------------------------  IN: 250 mL / OUT: 0 mL / NET: 250 mL      Daily     Daily     PHYSICAL EXAMINATION:  GENERAL: NAD, well-developed  HEAD:  Atraumatic, Normocephalic  EYES: EOMI, PERRLA, conjunctiva and sclera clear  MOUTH: no oral thrush  NECK: Supple, No JVD  CHEST/LUNG: Clear to auscultation bilaterally; No wheeze  HEART: Regular rate and rhythm; No murmurs, rubs, or gallops  ABDOMEN: Soft, Nontender, Nondistended; Bowel sounds present  EXTREMITIES:  2+ Peripheral Pulses, No clubbing, cyanosis, or edema  NEUROLOGY: AAOx1, RUE and LE 5/5 strength, no deficits on exam   SKIN: No rashes or lesions      LABS:

## 2023-04-26 NOTE — DISCHARGE NOTE PROVIDER - DETAILS OF MALNUTRITION DIAGNOSIS/DIAGNOSES
This patient has been assessed with a concern for Malnutrition and was treated during this hospitalization for the following Nutrition diagnosis/diagnoses:     -  05/01/2023: Severe protein-calorie malnutrition   -  05/01/2023: Underweight (BMI < 19)

## 2023-04-26 NOTE — PROGRESS NOTE ADULT - PROBLEM SELECTOR PLAN 3
Daily consumption of a bottle of rum, unclear when last drink was - pt does not remember   No hx of withdrawal seizures, hallucinations, DTs or hospitalizations  S/p ativan taper overnight, appeared lethargic this AM, d/c   - discontinued ativan for lethargy   - started on multivitamin, folate, thiamine

## 2023-04-26 NOTE — PHYSICAL THERAPY INITIAL EVALUATION ADULT - ADDITIONAL COMMENTS
Pt unable to provide social history 2/2 decreased cognition, per chart review pt lives alone and was independent with ADLs and functional mobility.

## 2023-04-26 NOTE — PROGRESS NOTE ADULT - ASSESSMENT
77M with EtOH use disorder, no known PMHx as hasn't seen doctors in decades, presents with R sided weakness/numbness x 2 day, found to have Left parietal mass w/ vasogenic edema.  77M with EtOH use disorder, no known PMHx as hasn't seen doctors in decades, presents with R sided weakness/numbness x 2 day, found to have Left parietal mass w/ vasogenic edema. Was placed on ativan taper for EtOH withdrawal however became very lethargic, was discontinued.

## 2023-04-26 NOTE — DISCHARGE NOTE PROVIDER - NSDCCPTREATMENT_GEN_ALL_CORE_FT
PRINCIPAL PROCEDURE  Procedure: Brain MRI  Findings and Treatment:   < end of copied text >  enhancing mass measuring 2.9 x 2.7 x 3.0 cm in the LEFT parietal lobe   with moderate surrounding edema which demonstrates mass effect on the   posterior horn of the LEFT lateral ventricle. A 1.4 cm lesion is medially   with mild surrounding edema and a 9 mm ring-enhancing lesion is seen in   the RIGHT occipital cortex with minimal edema. 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

## 2023-04-26 NOTE — PROGRESS NOTE ADULT - ATTENDING COMMENTS
This is a 77M with EtOH use disorder, hasn't seen doctors in decades, presents with R sided weakness/numbness x 2 day and AMS. MAR brain shows multiple enhancing masses within the brain, the largest ring enhancing mass measuring 2.9 x 2.7 x 3.0 cm in the LEFT parietal lobe with moderate surrounding edema which demonstrates mass effect on the posterior horn of the LEFT lateral ventricle. A 1.4 cm lesion is medially with mild surrounding edema and a 9 mm ring-enhancing lesion is seen in the RIGHT occipital cortex with minimal edema. 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    1. 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 Mediastinal and hilar adenopathy  3. Alcohol use    - Afebrile, VSS, still encephalopathic, noted RUE mild weakness, no s/s of ETOH withdrawal  - Reviewed MRI brain and CT c/a/p personally- spoke to NeuroSx- no plans for brain biopsy but rec- Pul or IR for tissue biopsy and started on Decadron 4mg q 6 hrs, AED for seizure ppx  - IR input noted- rec Pulmonary for mediastinal/Hilar LN biopsy  - spoke to SisterMichelle [phone] at bedside, will talk to other family members and make decision on biopsy of LN  - appreciated pulmonary plans- awaiting to hear from family for final decision  - Oncology input if biopsy  - Neurocheck q 4 hrs.

## 2023-04-27 NOTE — PROGRESS NOTE ADULT - ATTENDING COMMENTS
This is a 77M with EtOH use disorder, hasn't seen doctors in decades, presents with R sided weakness/numbness x 2 day and AMS. MAR brain shows multiple enhancing masses within the brain, the largest ring enhancing mass measuring 2.9 x 2.7 x 3.0 cm in the LEFT parietal lobe with moderate surrounding edema which demonstrates mass effect on the posterior horn of the LEFT lateral ventricle. A 1.4 cm lesion is medially with mild surrounding edema and a 9 mm ring-enhancing lesion is seen in the RIGHT occipital cortex with minimal edema. 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    1. 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 Mediastinal and hilar adenopathy  3. Alcohol use    - Afebrile, VSS, still encephalopathic, noted RUE mild weakness, no s/s of ETOH withdrawal  - Reviewed MRI brain and CT c/a/p personally- spoke to NeuroSx- no plans for brain biopsy but rec- Pul or IR for tissue biopsy and started on Decadron 4mg q 6 hrs, AED for seizure ppx  - IR input noted- rec Pulmonary for mediastinal/Hilar LN biopsy  - spoke to Michelle Moyer [phone] at bedside, no one is a HCP now, he has 7 children and mother, sister. Will ask Ethics consult   - appreciated pulmonary plans- awaiting to hear from family for final decision  - Oncology input if biopsy  - Neurocheck q 4 hrs

## 2023-04-27 NOTE — OCCUPATIONAL THERAPY INITIAL EVALUATION ADULT - ADL RETRAINING, OT EVAL
Patient will dress lower body (S) AE as needed in 4 weeks. Patient will dress upper body (s) in 4 weeks

## 2023-04-27 NOTE — PROGRESS NOTE ADULT - SUBJECTIVE AND OBJECTIVE BOX
PATIENT: MARY JARVIS, MRN: [id number]    CHIEF COMPLAINT: Patient is a 77y old  Male who presents with a chief complaint of R sided weakness/numbness (26 Apr 2023 14:41)      INTERVAL HISTORY/OVERNIGHT EVENTS: No overnight events.       MEDICATIONS:  MEDICATIONS  (STANDING):  chlorhexidine 4% Liquid 1 Application(s) Topical daily  dexAMETHasone     Tablet 4 milliGRAM(s) Oral every 6 hours  folic acid 1 milliGRAM(s) Oral daily  levETIRAcetam 500 milliGRAM(s) Oral two times a day  multivitamin 1 Tablet(s) Oral daily  nicotine -   7 mG/24Hr(s) Patch 1 Patch Transdermal daily    MEDICATIONS  (PRN):  acetaminophen     Tablet .. 650 milliGRAM(s) Oral every 6 hours PRN Temp greater or equal to 38C (100.4F), Mild Pain (1 - 3)  aluminum hydroxide/magnesium hydroxide/simethicone Suspension 30 milliLiter(s) Oral every 4 hours PRN Dyspepsia  melatonin 3 milliGRAM(s) Oral at bedtime PRN Insomnia  ondansetron Injectable 4 milliGRAM(s) IV Push every 8 hours PRN Nausea and/or Vomiting      ALLERGIES: Allergies    No Known Allergies    Intolerances        OBJECTIVE:  ICU Vital Signs Last 24 Hrs  T(C): 36.4 (27 Apr 2023 05:17), Max: 36.8 (26 Apr 2023 10:00)  T(F): 97.5 (27 Apr 2023 05:17), Max: 98.2 (26 Apr 2023 10:00)  HR: 65 (27 Apr 2023 05:17) (62 - 83)  BP: 117/69 (27 Apr 2023 05:17) (115/65 - 127/70)  BP(mean): --  ABP: --  ABP(mean): --  RR: 18 (27 Apr 2023 05:17) (16 - 18)  SpO2: 94% (27 Apr 2023 05:17) (94% - 100%)    O2 Parameters below as of 27 Apr 2023 01:54  Patient On (Oxygen Delivery Method): room air            Adult Advanced Hemodynamics Last 24 Hrs  CVP(mm Hg): --  CVP(cm H2O): --  CO: --  CI: --  PA: --  PA(mean): --  PCWP: --  SVR: --  SVRI: --  PVR: --  PVRI: --  CAPILLARY BLOOD GLUCOSE        CAPILLARY BLOOD GLUCOSE        I&O's Summary    Daily     Daily     PHYSICAL EXAMINATION:  GENERAL: NAD, well-developed  HEAD:  Atraumatic, Normocephalic  EYES: EOMI, PERRLA, conjunctiva and sclera clear  MOUTH: no oral thrush  NECK: Supple, No JVD  CHEST/LUNG: Clear to auscultation bilaterally; No wheeze  HEART: Regular rate and rhythm; No murmurs, rubs, or gallops  ABDOMEN: Soft, Nontender, Nondistended; Bowel sounds present  EXTREMITIES:  2+ Peripheral Pulses, No clubbing, cyanosis, or edema  NEUROLOGY: AAOx1, RUE and LE 5/5 strength, no deficits on exam   SKIN: No rashes or lesions    LABS:                          15.1   10.19 )-----------( 187      ( 26 Apr 2023 07:12 )             42.3     04-26    141  |  103  |  17  ----------------------------<  99  3.4<L>   |  25  |  1.07    Ca    9.7      26 Apr 2023 07:13  Phos  3.8     04-26  Mg     2.1     04-26             PATIENT: MARY JARVIS, MRN: [id number]    CHIEF COMPLAINT: Patient is a 77y old  Male who presents with a chief complaint of R sided weakness/numbness (26 Apr 2023 14:41)      INTERVAL HISTORY/OVERNIGHT EVENTS: No overnight events. Patient reports that he feels that he needs to spit and coughed up yellow sputum, otherwise no coughing, fever, chills. Still appears confused and does not realize he is in a hospital or why he is here.       MEDICATIONS:  MEDICATIONS  (STANDING):  chlorhexidine 4% Liquid 1 Application(s) Topical daily  dexAMETHasone     Tablet 4 milliGRAM(s) Oral every 6 hours  folic acid 1 milliGRAM(s) Oral daily  levETIRAcetam 500 milliGRAM(s) Oral two times a day  multivitamin 1 Tablet(s) Oral daily  nicotine -   7 mG/24Hr(s) Patch 1 Patch Transdermal daily    MEDICATIONS  (PRN):  acetaminophen     Tablet .. 650 milliGRAM(s) Oral every 6 hours PRN Temp greater or equal to 38C (100.4F), Mild Pain (1 - 3)  aluminum hydroxide/magnesium hydroxide/simethicone Suspension 30 milliLiter(s) Oral every 4 hours PRN Dyspepsia  melatonin 3 milliGRAM(s) Oral at bedtime PRN Insomnia  ondansetron Injectable 4 milliGRAM(s) IV Push every 8 hours PRN Nausea and/or Vomiting      ALLERGIES: Allergies    No Known Allergies    Intolerances        OBJECTIVE:  ICU Vital Signs Last 24 Hrs  T(C): 36.4 (27 Apr 2023 05:17), Max: 36.8 (26 Apr 2023 10:00)  T(F): 97.5 (27 Apr 2023 05:17), Max: 98.2 (26 Apr 2023 10:00)  HR: 65 (27 Apr 2023 05:17) (62 - 83)  BP: 117/69 (27 Apr 2023 05:17) (115/65 - 127/70)  BP(mean): --  ABP: --  ABP(mean): --  RR: 18 (27 Apr 2023 05:17) (16 - 18)  SpO2: 94% (27 Apr 2023 05:17) (94% - 100%)    O2 Parameters below as of 27 Apr 2023 01:54  Patient On (Oxygen Delivery Method): room air            Adult Advanced Hemodynamics Last 24 Hrs  CVP(mm Hg): --  CVP(cm H2O): --  CO: --  CI: --  PA: --  PA(mean): --  PCWP: --  SVR: --  SVRI: --  PVR: --  PVRI: --  CAPILLARY BLOOD GLUCOSE        CAPILLARY BLOOD GLUCOSE        I&O's Summary    Daily     Daily     PHYSICAL EXAMINATION:  GENERAL: NAD, well-developed  HEAD:  Atraumatic, Normocephalic  EYES: EOMI, PERRLA, conjunctiva and sclera clear  MOUTH: no oral thrush  NECK: Supple, No JVD  CHEST/LUNG: Clear to auscultation bilaterally; No wheeze  HEART: Regular rate and rhythm; No murmurs, rubs, or gallops  ABDOMEN: Soft, Nontender, Nondistended; Bowel sounds present  EXTREMITIES:  2+ Peripheral Pulses, No clubbing, cyanosis, or edema  NEUROLOGY: AAOx1, RUE and LE 5/5 strength, no deficits on exam   SKIN: No rashes or lesions    LABS:                          15.1   10.19 )-----------( 187      ( 26 Apr 2023 07:12 )             42.3     04-26    141  |  103  |  17  ----------------------------<  99  3.4<L>   |  25  |  1.07    Ca    9.7      26 Apr 2023 07:13  Phos  3.8     04-26  Mg     2.1     04-26

## 2023-04-27 NOTE — OCCUPATIONAL THERAPY INITIAL EVALUATION ADULT - LIVES WITH, PROFILE
Pt lives alone in an apt, +elevator, no steps to enter. (I) ADLs and functional transfers without AD/DME. (-) /alone

## 2023-04-27 NOTE — OCCUPATIONAL THERAPY INITIAL EVALUATION ADULT - PLANNED THERAPY INTERVENTIONS, OT EVAL
ADL retraining/balance training/cognitive, visual perceptual/fine motor coordination training/neuromuscular re-education/ROM/strengthening/transfer training

## 2023-04-27 NOTE — OCCUPATIONAL THERAPY INITIAL EVALUATION ADULT - TRANSFER TRAINING, PT EVAL
Patient will transfer to toilet with DME as needed (S) in 4 weeks. Pt will perform SPT (S) within 4 weeks.

## 2023-04-27 NOTE — PROGRESS NOTE ADULT - ASSESSMENT
77M with EtOH use disorder, no known PMHx as hasn't seen doctors in decades, presents with R sided weakness/numbness x 2 day, found to have Left parietal mass w/ vasogenic edema. Was placed on ativan taper for EtOH withdrawal however became very lethargic, was discontinued.  77M with EtOH use disorder, no known PMHx as hasn't seen doctors in decades, presents with R sided weakness/numbness x 2 day, found to have Left parietal mass w/ vasogenic edema. Was placed on ativan taper for EtOH withdrawal however became very lethargic, was discontinued. Pending GOC with family to decide about biopsy.

## 2023-04-27 NOTE — OCCUPATIONAL THERAPY INITIAL EVALUATION ADULT - RANGE OF MOTION EXAMINATION, UPPER EXTREMITY
Left UE Active ROM was WNL (within normal limits)/Right UE Active Assistive ROM was WNL (within normal limits)

## 2023-04-27 NOTE — OCCUPATIONAL THERAPY INITIAL EVALUATION ADULT - PERTINENT HX OF CURRENT PROBLEM, REHAB EVAL
77M with EtOH use disorder, no known PMHx as hasn't seen doctors in decades, presents with R sided weakness/numbness x 2 days. He states that he woke up woke up 2 days ago and did not have sensation in his right arm or leg, states he fell over.  Did not want to seek medical attention at that time. He states that right leg function has gradually improved but right arm function is still weak. Since the symptoms didn't get better, he came to ED for further evaluations. Denies head trauma, LOC, fever/chills, syncope.  He states that he has been drinking a bottle of rum everyday and smokes 4-5 cigarettes/day forever. Denies hx of withdrawal seizures, hallucinations or DTs. No hospitalizations for EtOH withdrawals as per patient. He lives with his sister and is not interested in seeing doctor as outpatient. In ED, CT shows Left parietal mass w/ vasogenic edema, no sig MLS. CTA w/ Left vertebral stenosis but good basilar filling  CT chest/abdomen-Mediastinal and right hilar lymphadenopathy. Nonspecific clustered nodules in the left lower lobe. Otherwise no evidence for metastatic disease within the chest or abdomen. Heterogeneous vertebral body appearance which could be due to underlying degenerative changes. Correlation with nuclear medicine bone scan may be of benefit.  CT head 4/23-Left frontal parietal mass with marked vasogenic edema. Further evaluation with contrast-enhanced MRI is recommended.  MR brain 4/24-Multiple enhancing masses within the brain, the largest ring enhancing mass measuring 2.9 x 2.7 x 3.0 cm in the LEFT parietal lobe with moderate surrounding edema which demonstrates mass effect on the posterior horn of the LEFT lateral ventricle. A 1.4 cm lesion is medially with mild surrounding edema and a 9 mm ring-enhancing lesion is seen in the RIGHT occipital cortex with minimal edema. 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

## 2023-04-27 NOTE — CHART NOTE - NSCHARTNOTEFT_GEN_A_CORE
Ethics was consulted to assist in the dilemma of an unknown surrogate for a patient who is currently lacking capacity requiring consent for biopsy and ongoing procedures. The patient and his domestic partner, Zoe Jones have over 30+ years of partnership with one biological child, Mikey ([phone]). Under the Family Healthcare Decisions Act, she is the legal surrogate who can be contacted for interventions going forward. She requests that the medical team leave a voice message if she is unable to reach the phone.     Full consult to follow.    Irma Segal, OTR/L  Medical Ethics  [phone] Ethics was consulted to assist in identifying a surrogate for a patient who currently does not have the capacity to consent for biopsy and ongoing procedures. The patient and his domestic partner, Zoe Jones have over 30+ years of partnership with one biological child, Mikey ([phone]). Under the Family Healthcare Decisions Act, she is the legal surrogate who can be contacted for interventions going forward. She requests that the medical team leave a voice message if she is unable to reach the phone.     Full consult to follow.    Irma Segal, OTR/L  Medical Ethics  [phone]

## 2023-04-27 NOTE — PROGRESS NOTE ADULT - PROBLEM SELECTOR PLAN 1
R sided weakness/numbness x 2 day, found to have Left parietal mass w/ vasogenic edema  - CTA neck with Left vertebral stenosis but good basilar filling.  - appreciate NSG recs: recommend MRI  MRI head with multiple masses, concerning for mets  CT chest/abd/pelv without signs of other malignancy, does have hilar lymphadenopathy   - pulm consulted for biopsy of lymph node, d/w sister about ultimate goals    - will need onc consult eventually   - continue decadron 4mg q6hr  - continue keppra 500mg BID R sided weakness/numbness x 2 day, found to have Left parietal mass w/ vasogenic edema  - CTA neck with Left vertebral stenosis but good basilar filling.  - appreciate NSG recs: recommend MRI  MRI head with multiple masses, concerning for mets  CT chest/abd/pelv without signs of other malignancy, does have hilar lymphadenopathy   Pulm consulted for biopsy of lymph node  Discussed with sister about ultimate goals, reported that she would not be able to make decisions alone, will attempt contact with pt's significant other/mother of children. If HCP not assigned, children would be primary decision makers.    - will need onc consult eventually   - continue decadron 4mg q6hr  - continue keppra 500mg BID

## 2023-04-28 NOTE — PROGRESS NOTE ADULT - SUBJECTIVE AND OBJECTIVE BOX
PATIENT: MARY JARVIS, MRN: [id number]    CHIEF COMPLAINT: Patient is a 77y old  Male who presents with a chief complaint of R sided weakness/numbness (27 Apr 2023 07:11)      INTERVAL HISTORY/OVERNIGHT EVENTS: No overnight events.     MEDICATIONS:  MEDICATIONS  (STANDING):  chlorhexidine 4% Liquid 1 Application(s) Topical daily  dexAMETHasone     Tablet 4 milliGRAM(s) Oral every 6 hours  folic acid 1 milliGRAM(s) Oral daily  levETIRAcetam 500 milliGRAM(s) Oral two times a day  multivitamin 1 Tablet(s) Oral daily  nicotine -   7 mG/24Hr(s) Patch 1 Patch Transdermal daily    MEDICATIONS  (PRN):  acetaminophen     Tablet .. 650 milliGRAM(s) Oral every 6 hours PRN Temp greater or equal to 38C (100.4F), Mild Pain (1 - 3)  aluminum hydroxide/magnesium hydroxide/simethicone Suspension 30 milliLiter(s) Oral every 4 hours PRN Dyspepsia  melatonin 3 milliGRAM(s) Oral at bedtime PRN Insomnia  ondansetron Injectable 4 milliGRAM(s) IV Push every 8 hours PRN Nausea and/or Vomiting      ALLERGIES: Allergies    No Known Allergies    Intolerances        OBJECTIVE:  ICU Vital Signs Last 24 Hrs  T(C): 36.5 (28 Apr 2023 05:15), Max: 36.8 (27 Apr 2023 18:00)  T(F): 97.7 (28 Apr 2023 05:15), Max: 98.2 (27 Apr 2023 18:00)  HR: 70 (28 Apr 2023 05:15) (70 - 91)  BP: 105/60 (28 Apr 2023 05:15) (105/60 - 122/81)  BP(mean): --  ABP: --  ABP(mean): --  RR: 18 (28 Apr 2023 05:15) (17 - 18)  SpO2: 98% (28 Apr 2023 05:15) (96% - 100%)    O2 Parameters below as of 28 Apr 2023 05:15  Patient On (Oxygen Delivery Method): room air            Adult Advanced Hemodynamics Last 24 Hrs  CVP(mm Hg): --  CVP(cm H2O): --  CO: --  CI: --  PA: --  PA(mean): --  PCWP: --  SVR: --  SVRI: --  PVR: --  PVRI: --  CAPILLARY BLOOD GLUCOSE        CAPILLARY BLOOD GLUCOSE        I&O's Summary    Daily     Daily     PHYSICAL EXAMINATION:  HEAD:  Atraumatic, Normocephalic  EYES: EOMI, PERRLA, conjunctiva and sclera clear  MOUTH: no oral thrush  NECK: Supple, No JVD  CHEST/LUNG: Clear to auscultation bilaterally; No wheeze  HEART: Regular rate and rhythm; No murmurs, rubs, or gallops  ABDOMEN: Soft, Nontender, Nondistended; Bowel sounds present  EXTREMITIES:  2+ Peripheral Pulses, No clubbing, cyanosis, or edema  NEUROLOGY: AAOx1, RUE and LE 5/5 strength, no deficits on exam   SKIN: No rashes or lesions    LABS:                          15.1   10.53 )-----------( 198      ( 27 Apr 2023 06:58 )             43.0     04-27    139  |  103  |  18  ----------------------------<  89  4.3   |  22  |  1.00    Ca    9.4      27 Apr 2023 06:58  Phos  3.8     04-26  Mg     2.1     04-26           PATIENT: MARY JARVIS, MRN: [id number]    CHIEF COMPLAINT: Patient is a 77y old  Male who presents with a chief complaint of R sided weakness/numbness (27 Apr 2023 07:11)      INTERVAL HISTORY/OVERNIGHT EVENTS: No overnight events. Patient wants to talk to his family on the phone, was able to contact his sister. Denies acute complaints at this time - denies CP, SOB, palpitations.     MEDICATIONS:  MEDICATIONS  (STANDING):  chlorhexidine 4% Liquid 1 Application(s) Topical daily  dexAMETHasone     Tablet 4 milliGRAM(s) Oral every 6 hours  folic acid 1 milliGRAM(s) Oral daily  levETIRAcetam 500 milliGRAM(s) Oral two times a day  multivitamin 1 Tablet(s) Oral daily  nicotine -   7 mG/24Hr(s) Patch 1 Patch Transdermal daily    MEDICATIONS  (PRN):  acetaminophen     Tablet .. 650 milliGRAM(s) Oral every 6 hours PRN Temp greater or equal to 38C (100.4F), Mild Pain (1 - 3)  aluminum hydroxide/magnesium hydroxide/simethicone Suspension 30 milliLiter(s) Oral every 4 hours PRN Dyspepsia  melatonin 3 milliGRAM(s) Oral at bedtime PRN Insomnia  ondansetron Injectable 4 milliGRAM(s) IV Push every 8 hours PRN Nausea and/or Vomiting      ALLERGIES: Allergies    No Known Allergies    Intolerances        OBJECTIVE:  ICU Vital Signs Last 24 Hrs  T(C): 36.5 (28 Apr 2023 05:15), Max: 36.8 (27 Apr 2023 18:00)  T(F): 97.7 (28 Apr 2023 05:15), Max: 98.2 (27 Apr 2023 18:00)  HR: 70 (28 Apr 2023 05:15) (70 - 91)  BP: 105/60 (28 Apr 2023 05:15) (105/60 - 122/81)  BP(mean): --  ABP: --  ABP(mean): --  RR: 18 (28 Apr 2023 05:15) (17 - 18)  SpO2: 98% (28 Apr 2023 05:15) (96% - 100%)    O2 Parameters below as of 28 Apr 2023 05:15  Patient On (Oxygen Delivery Method): room air            Adult Advanced Hemodynamics Last 24 Hrs  CVP(mm Hg): --  CVP(cm H2O): --  CO: --  CI: --  PA: --  PA(mean): --  PCWP: --  SVR: --  SVRI: --  PVR: --  PVRI: --  CAPILLARY BLOOD GLUCOSE        CAPILLARY BLOOD GLUCOSE        I&O's Summary    Daily     Daily     PHYSICAL EXAMINATION:  HEAD:  Atraumatic, Normocephalic  EYES: EOMI, PERRLA, conjunctiva and sclera clear  MOUTH: no oral thrush  NECK: Supple, No JVD  CHEST/LUNG: Clear to auscultation bilaterally; No wheeze  HEART: Regular rate and rhythm; No murmurs, rubs, or gallops  ABDOMEN: Soft, Nontender, Nondistended; Bowel sounds present  EXTREMITIES:  2+ Peripheral Pulses, No clubbing, cyanosis, or edema  NEUROLOGY: AAOx1, RUE and LE 5/5 strength, no deficits on exam   SKIN: No rashes or lesions    LABS:                          15.1   10.53 )-----------( 198      ( 27 Apr 2023 06:58 )             43.0     04-27    139  |  103  |  18  ----------------------------<  89  4.3   |  22  |  1.00    Ca    9.4      27 Apr 2023 06:58  Phos  3.8     04-26  Mg     2.1     04-26

## 2023-04-28 NOTE — PROGRESS NOTE ADULT - ASSESSMENT
77M with EtOH use disorder, no known PMHx as hasn't seen doctors in decades, presents with R sided weakness/numbness x 2 day, found to have Left parietal mass w/ vasogenic edema. Was placed on ativan taper for EtOH withdrawal however became very lethargic, was discontinued. Pending biopsy of lymph node  77M with EtOH use disorder, no known PMHx as hasn't seen doctors in decades, presents with R sided weakness/numbness x 2 day, found to have Left parietal mass w/ vasogenic edema. Was placed on ativan taper for EtOH withdrawal however became very lethargic, was discontinued. Pending biopsy of lymph node, however need palliative recs.

## 2023-04-28 NOTE — PROGRESS NOTE ADULT - PROBLEM SELECTOR PLAN 1
R sided weakness/numbness x 2 day, found to have Left parietal mass w/ vasogenic edema  - CTA neck with Left vertebral stenosis but good basilar filling.  - appreciate NSG recs: recommend MRI  MRI head with multiple masses, concerning for mets  CT chest/abd/pelv without signs of other malignancy, does have hilar lymphadenopathy   Pulm consulted for biopsy of lymph node  Ethics consulted for complicated GOC, spouse is the legal decision maker.    - will need onc consult eventually   - continue decadron 4mg q6hr  - continue keppra 500mg BID R sided weakness/numbness x 2 day, found to have Left parietal mass w/ vasogenic edema  - CTA neck with Left vertebral stenosis but good basilar filling.  - appreciate NSG recs: recommend MRI  MRI head with multiple masses, concerning for mets  CT chest/abd/pelv without signs of other malignancy, does have hilar lymphadenopathy   Pulm consulted for biopsy of lymph node  Ethics consulted for complicated GOC, spouse is the legal decision maker  - f/u palliative recs   - will need onc consult eventually   - continue decadron 4mg q6hr  - continue keppra 500mg BID

## 2023-04-28 NOTE — CONSULT NOTE ADULT - SUBJECTIVE AND OBJECTIVE BOX
Patient is a 77 male with EtOH use disorder, with unknown previous medical history  presents with right sided weakness/numbness for two days. He states that he woke up 2 days ago and did not have sensation in his right arm or leg, reporting that he fell. He initially did not seek medical care, however he decided to go to the ER after unresolved weakness and sensation. It was found that patient has left parietal mass with vasogenic edema with MRI demonstrating multiple masses with concern for mets. At the time of consult request, patient required consent for lymph node biopsy and ongoing intervention and lacked capacity to make informed decisions. Ethics was consulted to identify a surrogate for ongoing care.     Prognosis Estimate (survival in days, wks, mos, yrs): Guarded					  Patient Decision-Making Capacity:  0Has capacity    1 Lacks capacity due to current medical condition  Patient Aware of:  Diagnosis:  0 Yes   0 No  1 Unknown    Prognosis:  0 Yes   0 No  1 Unknown       Name of medical decision-maker should patient lack capacity:  Zoe Jones   [phone]               Relationship:  Domestic Partner  Role:  0 Health Care Agent     1 Legal Surrogate   		  Other ‘stakeholders’:  Michelle (sister) [phone]  Mikey Garvin (biological son) [phone] 							  Other Decision-Maker (i.e., HCA or Surrogate) Aware of:  Diagnosis: 1Yes   0No      Prognosis:  0 Yes    1 No Unclear prognosis at present.  Evidence of Patient’s Preference of Life-Sustaining Treatment (Written or Oral):				               Resuscitation status:   DNR:  0Yes   1No      DNI:  0Yes   1No        Discussion:   4/27/2023 6:15 pm Discussion between Rowena Freeman (Resident) and Irma Segal (Ethics Fellow): Clarified consult request  4/27/2023 6:16 pm – 6:26 pm Discussion between Michelle Joseph (Sister) and Irma Segal (Ethics Fellow): Fellow introduced self and role of ethics. Michelle discussed that she understands she is not the decision maker as there is no legal documentation regarding her proxy appointment. She reports that she knows her brother and his domestic partner, Zoe have had over a 30+ year relationship together with biological children. She stated, “I know Zoe lives in [place] and wanted him to move there with her but he did not want to because he wouldn’t able to continue his horse racing hobby.”  4/27/2023 6:35 pm – 6:45 pm Discussion between Zoe Jones (Domestic Partner) and Irma Segal (Ethics Fellow): Fellow introduced self and the role of ethics. Zoe Jones confirmed relationship and that they have one biological son, Edward. She expressed willingness to make the patient’s healthcare decisions. Fellow explained the Family Healthcare Decisions Act if she wanted to defer her surrogacy to their son at in the future. She was agreeable and provided phone number for Edward [phone]. She requests that the medical team leave a voice message if she is unable to reach her phone.   4/27/2023 6:45 pm – 6:50 pm Discussion between Rowena Freeman (Resident) and Irma Segal (Ethics Fellow): Provided handoff regarding legal surrogate.

## 2023-04-28 NOTE — CONSULT NOTE ADULT - PROBLEM SELECTOR RECOMMENDATION 9
The patient does not appear to be having a good understanding about his illness.   As per Ethics, the patient's partner, Zoe Jones [phone] is his surrogate decision maker   As per the primary team, the patient's partner is deciding for or against a LN Bx.   I tried calling his partner; however, she did not answer. On Monday, I will try to call back, so GOC and ACP can be further addressed

## 2023-04-28 NOTE — CONSULT NOTE ADULT - ASSESSMENT
77M with EtOH use disorder, no known PMHx as hasn't seen doctors in decades, presents with R sided weakness/numbness x 2 day, found to have Left parietal mass w/ vasogenic edema. Also with Mediastinal and hilar adenopathy He was placed on ativan taper for EtOH withdrawal however became very lethargic, so it was discontinued. Pending biopsy of lymph node. Geriatrics and Palliative Medicine ([facility]) Team was called for goals of care.

## 2023-04-28 NOTE — CONSULT NOTE ADULT - ASSESSMENT
Bioethical Analysis: The ethical issue is patient autonomy and provider beneficence/non-maleficence.     Autonomy is the capacity of self-governance such as understanding, reasoning, deliberating, managing, and independent choosing (Galina & Luis Alfredo, 2009). As the patient currently lacks decision making capacity, it is the provider’s duty of beneficence to ensure they seek a legal surrogate for this patient to ensure that informed decisions are made for consult regarding Bharathi’s care. To respect autonomous agents is to acknowledge their right to hold views, to make choices, and to take actions based on their values and beliefs (Galina & Luis Alfredo, 2009).   Under the Family Healthcare Decisions Act, the patient’s domestic partner of 30+ years where they are interdependent of each other and share a child in common. As Zoe Jones qualifies as a domestic partner, she is able to provide substituted judgement for Bharathi as he currently lacks the capacity to make his own healthcare decisions at present.     Conclusion:  The patient and his domestic partner, Zoe Jones have over 30+ years of partnership with one biological child, Mikey ([phone]). Under the Family Healthcare Decisions Act, she is the legal surrogate who can be contacted for interventions going forward. She requests that the medical team leave a voice message if she is unable to reach the phone.    CASE DISCUSSED WITH MEDICAL ETHICIST LYNDSAY MONTIEL, TITI, [facility] (DIRECTOR OF MEDICAL ETHICS)	  MORE THAN 50% OF THE TIME OF THIS CONSULTATION WAS SPENT IN COORDINATION OF CARE OF PATIENT										  References			  JP Mojica., & Luis Alfredo, J. F. (2009). Principles of Biomedical Ethics. [employer].							                  								   [place] [facility] § 2994-A (2014)    Irma Segal OTR/L  Medical Ethics Fellow   [phone]

## 2023-04-28 NOTE — PROGRESS NOTE ADULT - ATTENDING COMMENTS
This is a 77M with EtOH use disorder, hasn't seen doctors in decades, presents with R sided weakness/numbness x 2 day and AMS. MAR brain shows multiple enhancing masses within the brain, the largest ring enhancing mass measuring 2.9 x 2.7 x 3.0 cm in the LEFT parietal lobe with moderate surrounding edema which demonstrates mass effect on the posterior horn of the LEFT lateral ventricle. A 1.4 cm lesion is medially with mild surrounding edema and a 9 mm ring-enhancing lesion is seen in the RIGHT occipital cortex with minimal edema. 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    1. 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 Mediastinal and hilar adenopathy  3. Alcohol use    - Afebrile, VSS, confused at time with speech difficulty & poor historian, noted RUE mild weakness, no s/s of ETOH withdrawal  - Reviewed MRI brain and CT c/a/p personally- spoke to NeuroSx- no plans for brain biopsy but rec- Pul or IR for tissue biopsy and started on Decadron 4mg q 6 hrs, AED for seizure ppx  - IR input noted- rec Pulmonary for mediastinal/Hilar LN biopsy  - Appreciated Ethic eval- per rec his domestic partner, Zoe Jones (over 30+ years of partnership with one biological child, will be the egal surrogate who can be contacted for interventions going forward. His sister, Michelle [phone] has been seeing his also.   - appreciated pulmonary plans- awaiting to hear from family for final decision  - Palliative care consult requested  - Oncology input if going forward with biopsy  - Neurocheck q 4 hrs .  - seizure precaution This is a 77M with EtOH use disorder, hasn't seen doctors in decades, presents with R sided weakness/numbness x 2 day and AMS. MAR brain shows multiple enhancing masses within the brain, the largest ring enhancing mass measuring 2.9 x 2.7 x 3.0 cm in the LEFT parietal lobe with moderate surrounding edema which demonstrates mass effect on the posterior horn of the LEFT lateral ventricle. A 1.4 cm lesion is medially with mild surrounding edema and a 9 mm ring-enhancing lesion is seen in the RIGHT occipital cortex with minimal edema. 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    1. 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 Mediastinal and hilar adenopathy  3. Alcohol use    - Afebrile, VSS, confused at time with speech difficulty & poor historian, noted RUE mild weakness, no s/s of ETOH withdrawal  - Reviewed MRI brain and CT c/a/p personally- spoke to NeuroSx- no plans for brain biopsy but rec- Pul or IR for tissue biopsy and started on Decadron 4mg q 6 hrs, AED for seizure ppx  - IR input noted- rec Pulmonary for mediastinal/Hilar LN biopsy  - Appreciated Ethic eval- per rec his domestic partner, Zoe Jones- ([phone]), over 30+ years of partnership with one biological child, will be the egal surrogate who can be contacted for interventions going forward. His sister, Michelle [phone] has been seeing his also.   - appreciated pulmonary plans- awaiting to hear from family for final decision  - Palliative care consult requested  - Oncology input if going forward with biopsy  - Neurocheck q 4 hrs .  - seizure precaution

## 2023-04-28 NOTE — CONSULT NOTE ADULT - PROBLEM SELECTOR RECOMMENDATION 2
Will continue to f/u for GOC and ACP.         Vaughn Flanagan MD  Associate Chief Geriatrics and Palliative Care (GaP) [facility]   GaP Consult Service   , [place], [employer] [facility]      Please contact me via Teams from Monday through Friday between 9am-5pm. If not answering, please call the palliative care pager [phone]    After 5pm and on weekends, please see the contact information below:    In the event of newly developing, evolving, or worsening symptoms, please contact the Palliative Medicine team via pager (if the patient is at [facility] #[phone] or if the patient is at [facility] #[phone]) The Geriatric and Palliative Medicine service has coverage 24 hours a day/ 7 days a week to provide medical recommendations regarding symptom management needs via telephone

## 2023-04-28 NOTE — CONSULT NOTE ADULT - SUBJECTIVE AND OBJECTIVE BOX
HPI:  77M with EtOH use disorder, no known PMHx as hasn't seen doctors in decades, presents with R sided weakness/numbness x 2 days. He states that he woke up woke up 2 days ago and did not have sensation in his right arm or leg, states he fell over.  Did not want to seek medical attention at that time. He states that right leg function has gradually improved but right arm function is still weak. Since the symptoms didn't get better, he came to ED for further evaluations. Denies head trauma, LOC, fever/chills, syncope.    He states that he has been drinking a bottle of rum everyday and smokes 4-5 cigarettes/day forever. Denies hx of withdrawal seizures, hallucinations or DTs. No hospitalizations for EtOH withdrawals as per patient. He lives with his sister and is not interested in seeing doctor as outpatient.    In ED,  CT shows Left parietal mass w/ vasogenic edema, no sig MLS. CTA w/ Left vertebral stenosis but good basilar filling (24 Apr 2023 12:41)    Patient was admitted on 4/24, found to have left parietal mass, seen today, denies pain, weakness, [facility]     REVIEW OF SYSTEMS  Constitutional - No fever, No weight loss, No fatigue  HEENT - No eye pain, No visual disturbances, No difficulty hearing, No tinnitus, No vertigo, No neck pain  Respiratory - No cough, No wheezing, No shortness of breath  Cardiovascular - No chest pain, No palpitations  Gastrointestinal - No abdominal pain, No nausea, No vomiting, No diarrhea, No constipation  Genitourinary - No dysuria, No frequency, No hematuria, No incontinence  Musculoskeletal - No joint pain, No joint swelling, No muscle pain  Psychiatric - No depression, No anxiety    VITALS  T(C): 36.5 (04-28-23 @ 05:15), Max: 36.8 (04-27-23 @ 18:00)  HR: 70 (04-28-23 @ 05:15) (70 - 91)  BP: 105/60 (04-28-23 @ 05:15) (105/60 - 122/81)  RR: 18 (04-28-23 @ 05:15) (17 - 18)  SpO2: 98% (04-28-23 @ 05:15) (96% - 100%)  Wt(kg): --    PAST MEDICAL & SURGICAL HISTORY  Alcohol abuse, daily use    No significant past surgical history        SOCIAL HISTORY  Smoking - Denied  EtOH - see HPI   Drugs - Denied    FUNCTIONAL HISTORY  Independent AMB and ADLs PTA     CURRENT FUNCTIONAL STATUS  4/26 PT  lethargic, following 50% commands  bed mobility min assist  transfers min to mod assist with RW  gait mod assist x 2 steps with RW    4/27 OT  bed mobility min assist  transfers min assist   LB dressing max assist     FAMILY HISTORY   No pertinent family history in first degree relatives        RECENT LABS/IMAGING  CBC Full  -  ( 27 Apr 2023 06:58 )  WBC Count : 10.53 K/uL  RBC Count : 5.24 M/uL  Hemoglobin : 15.1 g/dL  Hematocrit : 43.0 %  Platelet Count - Automated : 198 K/uL  Mean Cell Volume : 82.1 fl  Mean Cell Hemoglobin : 28.8 pg  Mean Cell Hemoglobin Concentration : 35.1 gm/dL  Auto Neutrophil # : x  Auto Lymphocyte # : x  Auto Monocyte # : x  Auto Eosinophil # : x  Auto Basophil # : x  Auto Neutrophil % : x  Auto Lymphocyte % : x  Auto Monocyte % : x  Auto Eosinophil % : x  Auto Basophil % : x    04-27    139  |  103  |  18  ----------------------------<  89  4.3   |  22  |  1.00    Ca    9.4      27 Apr 2023 06:58    < from: CT Angio Head w/ IV Cont (04.23.23 @ 22:28) >    IMPRESSION:  CT brain perfusion: Abnormal perfusion in the left cerebral white matter,   most likely related to vasogenic edema.      CTA head and neck:  -Left vertebral artery is not visualized until approximately C4 level,   and is diminutive in size and attenuated, likely related to   occlusion/severe stenosis.  -No CTA evidence of aneurysm, or dissection.      < end of copied text >    < from: MR Brain Stereotactic w/wo IV Cont (04.24.23 @ 14:49) >    IMPRESSION:  Multiple enhancing masses within the brain, the largest ring   enhancing mass measuring 2.9 x 2.7 x 3.0 cm in the LEFT parietal lobe   with moderate surrounding edema which demonstrates mass effect on the   posterior horn of the LEFT lateral ventricle. A 1.4 cm lesion is medially   with mild surrounding edema and a 9 mm ring-enhancing lesion is seen in   the RIGHT occipital cortex with minimal edema. 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    < end of copied text >      ALLERGIES  No Known Allergies      MEDICATIONS   acetaminophen     Tablet .. 650 milliGRAM(s) Oral every 6 hours PRN  aluminum hydroxide/magnesium hydroxide/simethicone Suspension 30 milliLiter(s) Oral every 4 hours PRN  chlorhexidine 4% Liquid 1 Application(s) Topical daily  dexAMETHasone     Tablet 4 milliGRAM(s) Oral every 6 hours  folic acid 1 milliGRAM(s) Oral daily  levETIRAcetam 500 milliGRAM(s) Oral two times a day  melatonin 3 milliGRAM(s) Oral at bedtime PRN  multivitamin 1 Tablet(s) Oral daily  nicotine -   7 mG/24Hr(s) Patch 1 Patch Transdermal daily  ondansetron Injectable 4 milliGRAM(s) IV Push every 8 hours PRN      ----------------------------------------------------------------------------------------  PHYSICAL EXAM  Constitutional - NAD, Comfortable, in bed   Chest - Breathing comfortably  Cardiovascular - S1S2   Abdomen - Soft   Extremities - No C/C/E, No calf tenderness   Neurologic Exam -                    Cognitive - Awake, Alert, AAO to self, place: hospital, month: April, Year: 1974, not sure with choices, situation     Communication - Fluent, +dysarthria        Motor - moves all ext      Sensory - Intact to LT     Psychiatric - Mood stable, Affect WNL  ----------------------------------------------------------------------------------------  ASSESSMENT/PLAN  77yMale h/o ETOH, cigarettes with functional deficits due to brain mass/mets with right sided weakness  on decadron, keppra, awaiting LN biopsy  palliative care consulted for goals of care  Pain - Tylenol  DVT PPX - SCDs  Rehab - Will continue to follow for ongoing rehab needs and recommendations.   continue bedside therapy  out of bed to chair   requires min assist with functional mobility, +confusion/cognitive deficits    patient will need inpatient rehabilitation, depending on treatment plan/radiation, may be better if he goes to [facility] to allow office visits/outpatient treatment

## 2023-04-28 NOTE — CONSULT NOTE ADULT - SUBJECTIVE AND OBJECTIVE BOX
Date of Service:04-28-23 @ 09:43  HPI:  77M with EtOH use disorder, no known PMHx as hasn't seen doctors in decades, presents with R sided weakness/numbness x 2 day, found to have Left parietal mass w/ vasogenic edema. Also with Mediastinal and hilar adenopathy He was placed on ativan taper for EtOH withdrawal however became very lethargic, so it was discontinued. Pending biopsy of lymph node. Geriatrics and Palliative Medicine (GaP) Team was called for goals of care.     PERTINENT PM/SXH:   No pertinent past medical history    Alcohol abuse, daily use      No significant past surgical history      FAMILY HISTORY:  No pertinent family history in first degree relatives      Family Hx substance abuse [ ]yes [ ]no  ITEMS NOT CHECKED ARE NOT PRESENT    SOCIAL HISTORY:   Significant other/partner[ ]  Children[ ]  Yazidism/Spirituality:  Substance hx:  [ ]   Tobacco hx:  [ ]   Alcohol hx: [ ]   Home Opioid hx:  [ ] I-Stop Reference No:  Living Situation: [ ]Home  [ ]Long term care  [ ]Rehab [ ]Other    ADVANCE DIRECTIVES:    DNR/MOLST  [ ]  Living Will  [ ]   DECISION MAKER(s):  [ ] Health Care Proxy(s)  [ ] Surrogate(s)  [ ] Guardian           Name(s): Phone Number(s):  As per Ethics notes: Ethics was consulted to assist in identifying a surrogate for a patient who currently does not have the capacity to consent for biopsy and ongoing procedures. The patient and his domestic partner, Zoe Jones have over 30+ years of partnership with one biological child, Mikey ([phone]). Under the Family Healthcare Decisions Act, she is the legal surrogate who can be contacted for interventions going forward. She requests that the medical team leave a voice message if she is unable to reach the phone.   As per Care coordination notes: Found to have Left parietal mass w/ vasogenic  edema. Patient transferred from [facility] to [facility] for neurosurgical evaluation.  Patient states that he has been drinking a bottle of rum every day and smokes  4-5 cigarettes/day forever. Denies hx of withdrawal seizures, hallucinations or  DTs. No hospitalizations for EtOH withdrawals as per patient. He lives with his  sister and is not interested in seeing doctor as outpatient. Per neuro MR brain  is c/f metastatic disease. CT chest shows Mediastinal and right hilar  lymphadenopathy. Nonspecific clustered nodules in the left lower lobe. IR  consulted for mediastinal/hilar LAD biopsy.   BASELINE (I)ADL(s) (prior to admission):  [place]: [ ]Total  [ ] Moderate [ ]Dependent    Allergies    No Known Allergies    Intolerances    MEDICATIONS  (STANDING):  chlorhexidine 4% Liquid 1 Application(s) Topical daily  dexAMETHasone     Tablet 4 milliGRAM(s) Oral every 6 hours  folic acid 1 milliGRAM(s) Oral daily  levETIRAcetam 500 milliGRAM(s) Oral two times a day  multivitamin 1 Tablet(s) Oral daily  nicotine -   7 mG/24Hr(s) Patch 1 Patch Transdermal daily    MEDICATIONS  (PRN):  acetaminophen     Tablet .. 650 milliGRAM(s) Oral every 6 hours PRN Temp greater or equal to 38C (100.4F), Mild Pain (1 - 3)  aluminum hydroxide/magnesium hydroxide/simethicone Suspension 30 milliLiter(s) Oral every 4 hours PRN Dyspepsia  melatonin 3 milliGRAM(s) Oral at bedtime PRN Insomnia  ondansetron Injectable 4 milliGRAM(s) IV Push every 8 hours PRN Nausea and/or Vomiting    PRESENT SYMPTOMS: [ ]Unable to self-report  [ ] CPOT [ ] PAINADs [ ] RDOS  Source if other than patient:  [ ]Family   [ ]Team     Pain: [ ]yes [ ]no  QOL impact -   Location -                    Aggravating factors -  Quality -  Radiation -  Timing-  Severity (0-10 scale):  Minimal acceptable level (0-10 scale):     CPOT:    https://www.[facility].org/getattachment/[email or web address]/Critical-Care-Pain-Observation-Tool-(CPOT)    PAINAD Score: See PAINAD tool and score below     Dyspnea:                           [ ]Mild [ ]Moderate [ ]Severe    RDOS: See RDOS tool and score below   0 to 2  minimal or no respiratory distress   3  mild distress  4 to 6 moderate distress  >7 severe distress      Anxiety:                             [ ]Mild [ ]Moderate [ ]Severe  Fatigue:                             [ ]Mild [ ]Moderate [ ]Severe  Nausea:                             [ ]Mild [ ]Moderate [ ]Severe  Loss of appetite:              [ ]Mild [ ]Moderate [ ]Severe  Constipation:                    [ ]Mild [ ]Moderate [ ]Severe    PCSSQ[Palliative Care Spiritual Screening Question]   Severity (0-10):  Score of 4 or > indicate consideration of Chaplaincy referral.  Chaplaincy Referral: [ ] yes [ ] refused [ ] following [ ] Deferred     Caregiver [place]? : [ ] yes [ ] no [ ] Deferred [ ] Declined             Social work referral [ ] Patient & Family Centered Care Referral [ ]     Anticipatory Grief present?:  [ ] yes [ ] no  [ ] Deferred                  Social work referral [ ] Chaplaincy Referral [ ]    		  Other Symptoms:  [ ]All other review of systems negative     Palliative Performance Status Version 2:   See PPSv2 tool and score below          PHYSICAL EXAM:  Vital Signs Last 24 Hrs  T(C): 36.5 (28 Apr 2023 05:15), Max: 36.8 (27 Apr 2023 18:00)  T(F): 97.7 (28 Apr 2023 05:15), Max: 98.2 (27 Apr 2023 18:00)  HR: 70 (28 Apr 2023 05:15) (70 - 91)  BP: 105/60 (28 Apr 2023 05:15) (105/60 - 122/81)  BP(mean): --  RR: 18 (28 Apr 2023 05:15) (17 - 18)  SpO2: 98% (28 Apr 2023 05:15) (96% - 100%)    Parameters below as of 28 Apr 2023 05:15  Patient On (Oxygen Delivery Method): room air     I&O's Summary    GENERAL: [ ]Cachexia    [ ]Alert  [ ]Oriented x   [ ]Lethargic  [ ]Unarousable  [ ]Verbal  [ ]Non-Verbal  Behavioral:   [ ] Anxiety  [ ] Delirium [ ] Agitation [ ] Other  HEENT:  [ ]Normal   [ ]Dry mouth   [ ]ET Tube/Trach  [ ]Oral lesions  PULMONARY:   [ ]Clear [ ]Tachypnea  [ ]Audible excessive secretions   [ ]Rhonchi        [ ]Right [ ]Left [ ]Bilateral  [ ]Crackles        [ ]Right [ ]Left [ ]Bilateral  [ ]Wheezing     [ ]Right [ ]Left [ ]Bilateral  [ ]Diminished breath sounds [ ]right [ ]left [ ]bilateral  CARDIOVASCULAR:    [ ]Regular [ ]Irregular [ ]Tachy  [ ]Ab [ ]Murmur [ ]Other  GASTROINTESTINAL:  [ ]Soft  [ ]Distended   [ ]+BS  [ ]Non tender [ ]Tender  [ ]Other [ ]PEG [ ]OGT/ NGT  Last BM:  GENITOURINARY:  [ ]Normal [ ] Incontinent   [ ]Oliguria/Anuria   [ ]Cole  MUSCULOSKELETAL:   [ ]Normal   [ ]Weakness  [ ]Bed/Wheelchair bound [ ]Edema  NEUROLOGIC:   [ ]No focal deficits  [ ]Cognitive impairment  [ ]Dysphagia [ ]Dysarthria [ ]Paresis [ ]Other   SKIN:   [ ]Normal  [ ]Rash  [ ]Other  [ ]Pressure ulcer(s)       Present on admission [ ]y [ ]n    CRITICAL CARE:  [ ] Shock Present  [ ]Septic [ ]Cardiogenic [ ]Neurologic [ ]Hypovolemic  [ ]  Vasopressors [ ]  Inotropes   [ ]Respiratory failure present [ ]Mechanical ventilation [ ]Non-invasive ventilatory support [ ]High flow    [ ]Acute  [ ]Chronic [ ]Hypoxic  [ ]Hypercarbic [ ]Other  [ ]Other organ failure     LABS:                        15.1   10.53 )-----------( 198      ( 27 Apr 2023 06:58 )             43.0   04-27    139  |  103  |  18  ----------------------------<  89  4.3   |  22  |  1.00    Ca    9.4      27 Apr 2023 06:58          RADIOLOGY & ADDITIONAL STUDIES:    PROTEIN CALORIE MALNUTRITION PRESENT: [ ]mild [ ]moderate [ ]severe [ ]underweight [ ]morbid obesity  https://www.andeal.org/vault/2440/web/files/ONC/Table_Clinical%20Characteristics%20to%20Document%20Malnutrition-White%20JV%20et%20al%202012.pdf    Height (cm): 170.2 (04-24-23 @ 03:18), 170.2 (04-23-23 @ 19:34)  Weight (kg): 55 (04-24-23 @ 22:00), 68 (04-23-23 @ 19:34)  BMI (kg/m2): 19 (04-24-23 @ 22:00), 23.5 (04-24-23 @ 03:18), 23.5 (04-23-23 @ 19:34)    [ ]PPSV2 < or = to 30% [ ]significant weight loss  [ ]poor nutritional intake  [ ]anasarca[ ]Artificial Nutrition      Other REFERRALS:  [ ]Hospice  [ ]Child Life  [ ]Social Work  [ ]Case management [ ]Holistic Therapy     Goals of Care Document:  Date of Service:04-28-23 @ 09:43  HPI:  77M with EtOH use disorder, no known PMHx as hasn't seen doctors in decades, presents with R sided weakness/numbness x 2 day, found to have Left parietal mass w/ vasogenic edema. Also with Mediastinal and hilar adenopathy He was placed on ativan taper for EtOH withdrawal however became very lethargic, so it was discontinued. Pending biopsy of lymph node. Geriatrics and Palliative Medicine (GaP) Team was called for goals of care.     PERTINENT PM/SXH:   No pertinent past medical history    Alcohol abuse, daily use      No significant past surgical history      FAMILY HISTORY:  No pertinent family history in first degree relatives      Family Hx substance abuse [ ]yes [ ]no  ITEMS NOT CHECKED ARE NOT PRESENT    SOCIAL HISTORY:   Significant other/partner[ ]  Children[ ]  Uatsdin/Spirituality:  Substance hx:  [ ]   Tobacco hx:  [ ]   Alcohol hx: [ ]   Home Opioid hx:  [ ] I-Stop Reference No:  Living Situation: [ ]Home  [ ]Long term care  [ ]Rehab [ ]Other    ADVANCE DIRECTIVES:    DNR/MOLST  [ ]  Living Will  [ ]   DECISION MAKER(s):  [ ] Health Care Proxy(s)  [ ] Surrogate(s)  [ ] Guardian           Name(s): Phone Number(s):  As per Ethics notes: Ethics was consulted to assist in identifying a surrogate for a patient who currently does not have the capacity to consent for biopsy and ongoing procedures. The patient and his domestic partner, Zoe Jones have over 30+ years of partnership with one biological child, Mikey ([phone]). Under the Family Healthcare Decisions Act, she is the legal surrogate who can be contacted for interventions going forward. She requests that the medical team leave a voice message if she is unable to reach the phone.   As per Care coordination notes: Found to have Left parietal mass w/ vasogenic  edema. Patient transferred from [facility] to [facility] for neurosurgical evaluation.  Patient states that he has been drinking a bottle of rum every day and smokes  4-5 cigarettes/day forever. Denies hx of withdrawal seizures, hallucinations or  DTs. No hospitalizations for EtOH withdrawals as per patient. He lives with his  sister and is not interested in seeing doctor as outpatient. Per neuro MR brain  is c/f metastatic disease. CT chest shows Mediastinal and right hilar  lymphadenopathy. Nonspecific clustered nodules in the left lower lobe. IR  consulted for mediastinal/hilar LAD biopsy.   BASELINE (I)ADL(s) (prior to admission):  [place]: [ ]Total  [ ] Moderate [ ]Dependent    Allergies    No Known Allergies    Intolerances    MEDICATIONS  (STANDING):  chlorhexidine 4% Liquid 1 Application(s) Topical daily  dexAMETHasone     Tablet 4 milliGRAM(s) Oral every 6 hours  folic acid 1 milliGRAM(s) Oral daily  levETIRAcetam 500 milliGRAM(s) Oral two times a day  multivitamin 1 Tablet(s) Oral daily  nicotine -   7 mG/24Hr(s) Patch 1 Patch Transdermal daily    MEDICATIONS  (PRN):  acetaminophen     Tablet .. 650 milliGRAM(s) Oral every 6 hours PRN Temp greater or equal to 38C (100.4F), Mild Pain (1 - 3)  aluminum hydroxide/magnesium hydroxide/simethicone Suspension 30 milliLiter(s) Oral every 4 hours PRN Dyspepsia  melatonin 3 milliGRAM(s) Oral at bedtime PRN Insomnia  ondansetron Injectable 4 milliGRAM(s) IV Push every 8 hours PRN Nausea and/or Vomiting    PRESENT SYMPTOMS: [ ]Unable to self-report  [ ] CPOT [ ] PAINADs [ ] RDOS  Source if other than patient:  [ ]Family   [ ]Team     Pain: [ ]yes [ ]no  QOL impact -   Location -                    Aggravating factors -  Quality -  Radiation -  Timing-  Severity (0-10 scale):  Minimal acceptable level (0-10 scale):     CPOT:    https://www.[facility].org/getattachment/[email or web address]-1e6u-[email or web address]/Critical-Care-Pain-Observation-Tool-(CPOT)    PAINAD Score: See PAINAD tool and score below     Dyspnea:                           [ ]Mild [ ]Moderate [ ]Severe    RDOS: See RDOS tool and score below   0 to 2  minimal or no respiratory distress   3  mild distress  4 to 6 moderate distress  >7 severe distress      Anxiety:                             [ ]Mild [ ]Moderate [ ]Severe  Fatigue:                             [ ]Mild [ ]Moderate [ ]Severe  Nausea:                             [ ]Mild [ ]Moderate [ ]Severe  Loss of appetite:              [ ]Mild [ ]Moderate [ ]Severe  Constipation:                    [ ]Mild [ ]Moderate [ ]Severe    PCSSQ[Palliative Care Spiritual Screening Question]   Severity (0-10):  Score of 4 or > indicate consideration of Chaplaincy referral.  Chaplaincy Referral: [ ] yes [ ] refused [ ] following [ ] Deferred     Caregiver [place]? : [ ] yes [ ] no [ ] Deferred [ ] Declined             Social work referral [ ] Patient & Family Centered Care Referral [ ]     Anticipatory Grief present?:  [ ] yes [ ] no  [ ] Deferred                  Social work referral [ ] Chaplaincy Referral [ ]    		  Other Symptoms:  [ ]All other review of systems negative     Palliative Performance Status Version 2:   See PPSv2 tool and score below          PHYSICAL EXAM:  Vital Signs Last 24 Hrs  T(C): 36.5 (28 Apr 2023 05:15), Max: 36.8 (27 Apr 2023 18:00)  T(F): 97.7 (28 Apr 2023 05:15), Max: 98.2 (27 Apr 2023 18:00)  HR: 70 (28 Apr 2023 05:15) (70 - 91)  BP: 105/60 (28 Apr 2023 05:15) (105/60 - 122/81)  BP(mean): --  RR: 18 (28 Apr 2023 05:15) (17 - 18)  SpO2: 98% (28 Apr 2023 05:15) (96% - 100%)    Parameters below as of 28 Apr 2023 05:15  Patient On (Oxygen Delivery Method): room air     I&O's Summary    GENERAL: [ ]Cachexia    [ ]Alert  [ ]Oriented x   [ ]Lethargic  [ ]Unarousable  [ ]Verbal  [ ]Non-Verbal  Behavioral:   [ ] Anxiety  [ ] Delirium [ ] Agitation [ ] Other  HEENT:  [ ]Normal   [ ]Dry mouth   [ ]ET Tube/Trach  [ ]Oral lesions  PULMONARY:   [ ]Clear [ ]Tachypnea  [ ]Audible excessive secretions   [ ]Rhonchi        [ ]Right [ ]Left [ ]Bilateral  [ ]Crackles        [ ]Right [ ]Left [ ]Bilateral  [ ]Wheezing     [ ]Right [ ]Left [ ]Bilateral  [ ]Diminished breath sounds [ ]right [ ]left [ ]bilateral  CARDIOVASCULAR:    [ ]Regular [ ]Irregular [ ]Tachy  [ ]Ab [ ]Murmur [ ]Other  GASTROINTESTINAL:  [ ]Soft  [ ]Distended   [ ]+BS  [ ]Non tender [ ]Tender  [ ]Other [ ]PEG [ ]OGT/ NGT  Last BM:  GENITOURINARY:  [ ]Normal [ ] Incontinent   [ ]Oliguria/Anuria   [ ]Cole  MUSCULOSKELETAL:   [ ]Normal   [ ]Weakness  [ ]Bed/Wheelchair bound [ ]Edema  NEUROLOGIC:   [ ]No focal deficits  [ ]Cognitive impairment  [ ]Dysphagia [ ]Dysarthria [ ]Paresis [ ]Other   SKIN:   [ ]Normal  [ ]Rash  [ ]Other  [ ]Pressure ulcer(s)       Present on admission [ ]y [ ]n    CRITICAL CARE:  [ ] Shock Present  [ ]Septic [ ]Cardiogenic [ ]Neurologic [ ]Hypovolemic  [ ]  Vasopressors [ ]  Inotropes   [ ]Respiratory failure present [ ]Mechanical ventilation [ ]Non-invasive ventilatory support [ ]High flow    [ ]Acute  [ ]Chronic [ ]Hypoxic  [ ]Hypercarbic [ ]Other  [ ]Other organ failure     LABS:                        15.1   10.53 )-----------( 198      ( 27 Apr 2023 06:58 )             43.0   04-27    139  |  103  |  18  ----------------------------<  89  4.3   |  22  |  1.00    Ca    9.4      27 Apr 2023 06:58          RADIOLOGY & ADDITIONAL STUDIES:  < from: MR Brain Stereotactic w/wo IV Cont (04.24.23 @ 14:49) >    IMPRESSION:  Multiple enhancing masses within the brain, the largest ring   enhancing mass measuring 2.9 x 2.7 x 3.0 cm in the LEFT parietal lobe   with moderate surrounding edema which demonstrates mass effect on the   posterior horn of the LEFT lateral ventricle. A 1.4 cm lesion is medially   with mild surrounding edema and a 9 mm ring-enhancing lesion is seen in   the RIGHT occipital cortex with minimal edema. 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    --- End of Report ---            KATINA RANDALL MD; Attending Radiologist  This document has been electronically signed. Apr 24 2023  4:10PM    < end of copied text >  < from: CT Chest w/ IV Cont (04.24.23 @ 05:34) >  IMPRESSION:  Mediastinal and right hilar lymphadenopathy. Nonspecific clustered   nodules in the left lower lobe.    Otherwise no evidence for metastatic disease within the chest or abdomen.   Heterogeneous vertebral body appearance which could be due to underlying   degenerative changes. Correlation with nuclear medicine bone scan may be   of benefit.        --- End of Report ---          ELOISA CHAVEZ MD; Resident Radiologist  This document has been electronically signed.  APRIL TOLENTINO MD; Attending Radiologist  This document hasbeen electronically signed. Apr 24 2023  6:07AM    < end of copied text >    PROTEIN CALORIE MALNUTRITION PRESENT: [ ]mild [ ]moderate [ ]severe [ ]underweight [ ]morbid obesity  https://www.andeal.org/vault/2440/web/files/ONC/Table_Clinical%20Characteristics%20to%20Document%20Malnutrition-White%20JV%20et%20al%202012.pdf    Height (cm): 170.2 (04-24-23 @ 03:18), 170.2 (04-23-23 @ 19:34)  Weight (kg): 55 (04-24-23 @ 22:00), 68 (04-23-23 @ 19:34)  BMI (kg/m2): 19 (04-24-23 @ 22:00), 23.5 (04-24-23 @ 03:18), 23.5 (04-23-23 @ 19:34)    [ ]PPSV2 < or = to 30% [ ]significant weight loss  [ ]poor nutritional intake  [ ]anasarca[ ]Artificial Nutrition      Other REFERRALS:  [ ]Hospice  [ ]Child Life  [ ]Social Work  [ ]Case management [ ]Holistic Therapy     Goals of Care Document:  Date of Service:04-28-23 @ 09:43  HPI:  77M with EtOH use disorder, no known PMHx as hasn't seen doctors in decades, presents with R sided weakness/numbness x 2 day, found to have Left parietal mass w/ vasogenic edema. Also with Mediastinal and hilar adenopathy He was placed on ativan taper for EtOH withdrawal however became very lethargic, so it was discontinued. Pending biopsy of lymph node. Geriatrics and Palliative Medicine (GaP) Team was called for goals of care.     4/28 The patient was slightly confused. Initially, he believed he was in a hotel; however, he was able to be re-oriented and recalling he was in the hospital. In simple terms I tried to help him to understand he has large LN near his lungs and that we needed to take a sample; however, he was not able to register that info and stating his lungs were well and that he did not have any medical problems. He denied pain, respiratory distress, or any other complaints. "They just want to keep me here."     PERTINENT PM/SXH:   No pertinent past medical history    Alcohol abuse, daily use      No significant past surgical history      FAMILY HISTORY:  No pertinent family history in first degree relatives      Family Hx substance abuse [ ]yes [ ]no  ITEMS NOT CHECKED ARE NOT PRESENT    SOCIAL HISTORY:   Significant other/partner[ ]  Children[ ]  Mandaeism/Spirituality:  Substance hx:  [ ]   Tobacco hx:  [ ]   Alcohol hx: [ ]   Home Opioid hx:  [ ] I-Stop Reference No:  Living Situation: [ ]Home  [ ]Long term care  [ ]Rehab [ ]Other    ADVANCE DIRECTIVES:    DNR/MOLST  [ ]  Living Will  [ ]   DECISION MAKER(s):  [ ] Health Care Proxy(s)  [x ] Surrogate(s)  [ ] Guardian           Name(s): Phone Number(s):  As per Ethics notes: Ethics was consulted to assist in identifying a surrogate for a patient who currently does not have the capacity to consent for biopsy and ongoing procedures. The patient and his domestic partner, Zoe Jones have over 30+ years of partnership with one biological child, Mikey ([phone]). Under the Family Healthcare Decisions Act, she is the legal surrogate who can be contacted for interventions going forward. She requests that the medical team leave a voice message if she is unable to reach the phone.     As per Care coordination notes: Found to have Left parietal mass w/ vasogenic  edema. Patient transferred from [facility] to [facility] for neurosurgical evaluation.  Patient states that he has been drinking a bottle of rum every day and smokes  4-5 cigarettes/day forever. Denies hx of withdrawal seizures, hallucinations or  DTs. No hospitalizations for EtOH withdrawals as per patient. He lives with his  sister and is not interested in seeing doctor as outpatient. Per neuro MR brain  is c/f metastatic disease. CT chest shows Mediastinal and right hilar  lymphadenopathy. Nonspecific clustered nodules in the left lower lobe. IR  consulted for mediastinal/hilar LAD biopsy.   BASELINE (I)ADL(s) (prior to admission):  [place]: [ ]Total  [ ] Moderate [ ]Dependent    Allergies    No Known Allergies    Intolerances    MEDICATIONS  (STANDING):  chlorhexidine 4% Liquid 1 Application(s) Topical daily  dexAMETHasone     Tablet 4 milliGRAM(s) Oral every 6 hours  folic acid 1 milliGRAM(s) Oral daily  levETIRAcetam 500 milliGRAM(s) Oral two times a day  multivitamin 1 Tablet(s) Oral daily  nicotine -   7 mG/24Hr(s) Patch 1 Patch Transdermal daily    MEDICATIONS  (PRN):  acetaminophen     Tablet .. 650 milliGRAM(s) Oral every 6 hours PRN Temp greater or equal to 38C (100.4F), Mild Pain (1 - 3)  aluminum hydroxide/magnesium hydroxide/simethicone Suspension 30 milliLiter(s) Oral every 4 hours PRN Dyspepsia  melatonin 3 milliGRAM(s) Oral at bedtime PRN Insomnia  ondansetron Injectable 4 milliGRAM(s) IV Push every 8 hours PRN Nausea and/or Vomiting    PRESENT SYMPTOMS: [ ]Unable to self-report  [ ] CPOT [ ] PAINADs [ ] RDOS  Source if other than patient:  [ ]Family   [ ]Team     Pain: [ ]yes [x ]no  QOL impact -   Location -                    Aggravating factors -  Quality -  Radiation -  Timing-  Severity (0-10 scale):  Minimal acceptable level (0-10 scale):     CPOT:    https://www.[facility].org/getattachment/jkj35n57-8v5p-4d2x-0d7y-6480v0237d0s/Critical-Care-Pain-Observation-Tool-(CPOT)    PAINAD Score: See PAINAD tool and score below     Dyspnea:                           [ ]Mild [ ]Moderate [ ]Severe    RDOS: See RDOS tool and score below   0 to 2  minimal or no respiratory distress   3  mild distress  4 to 6 moderate distress  >7 severe distress      Anxiety:                             [ ]Mild [ ]Moderate [ ]Severe  Fatigue:                             [ ]Mild [ ]Moderate [ ]Severe  Nausea:                             [ ]Mild [ ]Moderate [ ]Severe  Loss of appetite:              [ ]Mild [ ]Moderate [ ]Severe  Constipation:                    [ ]Mild [ ]Moderate [ ]Severe    PCSSQ[Palliative Care Spiritual Screening Question]   Severity (0-10):  Score of 4 or > indicate consideration of Chaplaincy referral.  Chaplaincy Referral: [ ] yes [ ] refused [ ] following [x ] Deferred     Caregiver [place]? : [ ] yes [ ] no [ x] Deferred [ ] Declined             Social work referral [ ] Patient & Family Centered Care Referral [ ]     Anticipatory Grief present?:  [ ] yes [ ] no  [ x] Deferred                  Social work referral [ ] Chaplaincy Referral [ ]    		  Other Symptoms:  [x ]All other review of systems negative     Palliative Performance Status Version 2:   See PPSv2 tool and score below          PHYSICAL EXAM:  Vital Signs Last 24 Hrs  T(C): 36.5 (28 Apr 2023 05:15), Max: 36.8 (27 Apr 2023 18:00)  T(F): 97.7 (28 Apr 2023 05:15), Max: 98.2 (27 Apr 2023 18:00)  HR: 70 (28 Apr 2023 05:15) (70 - 91)  BP: 105/60 (28 Apr 2023 05:15) (105/60 - 122/81)  BP(mean): --  RR: 18 (28 Apr 2023 05:15) (17 - 18)  SpO2: 98% (28 Apr 2023 05:15) (96% - 100%)    Parameters below as of 28 Apr 2023 05:15  Patient On (Oxygen Delivery Method): room air     I&O's Summary    GENERAL: [ ]Cachexia    [x ]Alert  [ x]Oriented x 2-3  [ ]Lethargic  [ ]Unarousable  [ x]Verbal  [ ]Non-Verbal  Behavioral:   [ ] Anxiety  [x ] Delirium [ ] Agitation [ ] Other  HEENT:  [ ]Normal   [ ]Dry mouth   [ ]ET Tube/Trach  [ ]Oral lesions [x] Dentures   PULMONARY:   [ ]Clear [ ]Tachypnea  [ ]Audible excessive secretions   [ ]Rhonchi        [ ]Right [ ]Left [ ]Bilateral  [ ]Crackles        [ ]Right [ ]Left [ ]Bilateral  [ ]Wheezing     [ ]Right [ ]Left [ ]Bilateral  [x ]Diminished breath sounds [ ]right [ ]left [x ]bilateral  CARDIOVASCULAR:    [x ]Regular [ ]Irregular [ ]Tachy  [ ]Ab [ ]Murmur [ ]Other  GASTROINTESTINAL:  [x ]Soft  [ ]Distended   [x ]+BS  [x ]Non tender [ ]Tender  [ ]Other [ ]PEG [ ]OGT/ NGT  Last BM:4/27  GENITOURINARY:  [x ]Normal [ ] Incontinent   [ ]Oliguria/Anuria   [ ]Cole  MUSCULOSKELETAL:   [ ]Normal   [x ]Weakness  [ ]Bed/Wheelchair bound [ ]Edema  NEUROLOGIC:   [ ]No focal deficits  [ ]Cognitive impairment  [ ]Dysphagia [ ]Dysarthria [ ]Paresis [x ]Other: Confused    SKIN:   [x ]Normal  [ ]Rash  [ ]Other  [ ]Pressure ulcer(s)       Present on admission [ ]y [ ]n    CRITICAL CARE:  [ ] Shock Present  [ ]Septic [ ]Cardiogenic [ ]Neurologic [ ]Hypovolemic  [ ]  Vasopressors [ ]  Inotropes   [ ]Respiratory failure present [ ]Mechanical ventilation [ ]Non-invasive ventilatory support [ ]High flow    [ ]Acute  [ ]Chronic [ ]Hypoxic  [ ]Hypercarbic [ ]Other  [ ]Other organ failure     LABS:                        15.1   10.53 )-----------( 198      ( 27 Apr 2023 06:58 )             43.0   04-27    139  |  103  |  18  ----------------------------<  89  4.3   |  22  |  1.00    Ca    9.4      27 Apr 2023 06:58    RADIOLOGY & ADDITIONAL STUDIES:  < from: MR Brain Stereotactic w/wo IV Cont (04.24.23 @ 14:49) >    IMPRESSION:  Multiple enhancing masses within the brain, the largest ring   enhancing mass measuring 2.9 x 2.7 x 3.0 cm in the LEFT parietal lobe   with moderate surrounding edema which demonstrates mass effect on the   posterior horn of the LEFT lateral ventricle. A 1.4 cm lesion is medially   with mild surrounding edema and a 9 mm ring-enhancing lesion is seen in   the RIGHT occipital cortex with minimal edema. 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    --- End of Report ---            KATINA RANDALL MD; Attending Radiologist  This document has been electronically signed. Apr 24 2023  4:10PM    < end of copied text >  < from: CT Chest w/ IV Cont (04.24.23 @ 05:34) >  IMPRESSION:  Mediastinal and right hilar lymphadenopathy. Nonspecific clustered   nodules in the left lower lobe.    Otherwise no evidence for metastatic disease within the chest or abdomen.   Heterogeneous vertebral body appearance which could be due to underlying   degenerative changes. Correlation with nuclear medicine bone scan may be   of benefit.        --- End of Report ---          ELOISA CHAVEZ MD; Resident Radiologist  This document has been electronically signed.  APRIL TOLENTINO MD; Attending Radiologist  This document hasbeen electronically signed. Apr 24 2023  6:07AM    < end of copied text >    PROTEIN CALORIE MALNUTRITION PRESENT: [ ]mild [ ]moderate [ ]severe [ ]underweight [ ]morbid obesity  https://www.andeal.org/vault/2440/web/files/ONC/Table_Clinical%20Characteristics%20to%20Document%20Malnutrition-White%20JV%20et%20al%903954.pdf    Height (cm): 170.2 (04-24-23 @ 03:18), 170.2 (04-23-23 @ 19:34)  Weight (kg): 55 (04-24-23 @ 22:00), 68 (04-23-23 @ 19:34)  BMI (kg/m2): 19 (04-24-23 @ 22:00), 23.5 (04-24-23 @ 03:18), 23.5 (04-23-23 @ 19:34)    [ ]PPSV2 < or = to 30% [ ]significant weight loss  [ ]poor nutritional intake  [ ]anasarca[ ]Artificial Nutrition      Other REFERRALS:  [ ]Hospice  [ ]Child Life  [ ]Social Work  [ ]Case management [ ]Holistic Therapy     Goals of Care Document:

## 2023-04-29 NOTE — PROGRESS NOTE ADULT - ATTENDING COMMENTS
This is a 77M with EtOH use disorder, hasn't seen doctors in decades, presents with R sided weakness/numbness x 2 day and AMS. MAR brain shows multiple enhancing masses within the brain, the largest ring enhancing mass measuring 2.9 x 2.7 x 3.0 cm in the LEFT parietal lobe with moderate surrounding edema which demonstrates mass effect on the posterior horn of the LEFT lateral ventricle. A 1.4 cm lesion is medially with mild surrounding edema and a 9 mm ring-enhancing lesion is seen in the RIGHT occipital cortex with minimal edema. 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    1. 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 Mediastinal and hilar adenopathy  3. Alcohol use    - Feels ok, no fever, HA, chest pain, N/V, VSS, confused at time with speech difficulty & poor historian, noted RUE mild weakness, no s/s of ETOH withdrawal  - Reviewed MRI brain and CT c/a/p personally- spoke to NeuroSx- no plans for brain biopsy but rec- Pul or IR for tissue biopsy and started on Decadron 4mg q 6 hrs, AED for seizure ppx  - IR input noted- rec Pulmonary for mediastinal/Hilar LN biopsy  - Appreciated Ethic eval- per rec his domestic partner, Zoe Jones- ([phone]), over 30+ years of partnership with one biological child, will be the legal surrogate who can be contacted for interventions going forward. His sister, Michelle [phone] has been seeing his also.   - appreciated pulmonary plans- awaiting to hear from family for final decision  - Palliative care will re-eval in 2 days and speak to wife  - Oncology input if going forward with biopsy  - Neurocheck q 4 hrs .  - seizure precaution.

## 2023-04-29 NOTE — PROGRESS NOTE ADULT - SUBJECTIVE AND OBJECTIVE BOX
PATIENT: MARY JARVIS, MRN: [id number]    CHIEF COMPLAINT: Patient is a 77y old  Male who presents with a chief complaint of R sided weakness/numbness (28 Apr 2023 17:07)      INTERVAL HISTORY/OVERNIGHT EVENTS: No overnight events.       MEDICATIONS:  MEDICATIONS  (STANDING):  chlorhexidine 4% Liquid 1 Application(s) Topical daily  dexAMETHasone     Tablet 4 milliGRAM(s) Oral every 6 hours  folic acid 1 milliGRAM(s) Oral daily  levETIRAcetam 500 milliGRAM(s) Oral two times a day  multivitamin 1 Tablet(s) Oral daily  nicotine -   7 mG/24Hr(s) Patch 1 Patch Transdermal daily    MEDICATIONS  (PRN):  acetaminophen     Tablet .. 650 milliGRAM(s) Oral every 6 hours PRN Temp greater or equal to 38C (100.4F), Mild Pain (1 - 3)  aluminum hydroxide/magnesium hydroxide/simethicone Suspension 30 milliLiter(s) Oral every 4 hours PRN Dyspepsia  melatonin 3 milliGRAM(s) Oral at bedtime PRN Insomnia  ondansetron Injectable 4 milliGRAM(s) IV Push every 8 hours PRN Nausea and/or Vomiting      ALLERGIES: Allergies    No Known Allergies    Intolerances        OBJECTIVE:  ICU Vital Signs Last 24 Hrs  T(C): 36.6 (29 Apr 2023 04:45), Max: 36.6 (28 Apr 2023 14:26)  T(F): 97.8 (29 Apr 2023 04:45), Max: 97.9 (28 Apr 2023 14:26)  HR: 68 (29 Apr 2023 04:45) (68 - 78)  BP: 104/60 (29 Apr 2023 04:45) (104/60 - 126/74)  BP(mean): --  ABP: --  ABP(mean): --  RR: 18 (29 Apr 2023 04:45) (18 - 18)  SpO2: 98% (29 Apr 2023 04:45) (95% - 98%)    O2 Parameters below as of 29 Apr 2023 04:45  Patient On (Oxygen Delivery Method): room air            Adult Advanced Hemodynamics Last 24 Hrs  CVP(mm Hg): --  CVP(cm H2O): --  CO: --  CI: --  PA: --  PA(mean): --  PCWP: --  SVR: --  SVRI: --  PVR: --  PVRI: --  CAPILLARY BLOOD GLUCOSE        CAPILLARY BLOOD GLUCOSE        I&O's Summary    28 Apr 2023 07:01  -  29 Apr 2023 07:00  --------------------------------------------------------  IN: 0 mL / OUT: 500 mL / NET: -500 mL      Daily     Daily     PHYSICAL EXAMINATION:  HEAD:  Atraumatic, Normocephalic  EYES: EOMI, PERRLA, conjunctiva and sclera clear  MOUTH: no oral thrush  NECK: Supple, No JVD  CHEST/LUNG: Clear to auscultation bilaterally; No wheeze  HEART: Regular rate and rhythm; No murmurs, rubs, or gallops  ABDOMEN: Soft, Nontender, Nondistended; Bowel sounds present  EXTREMITIES:  2+ Peripheral Pulses, No clubbing, cyanosis, or edema  NEUROLOGY: AAOx1, RUE and LE 5/5 strength, no deficits on exam   SKIN: No rashes or lesions             PATIENT: MARY JARVIS, MRN: [id number]    CHIEF COMPLAINT: Patient is a 77y old  Male who presents with a chief complaint of R sided weakness/numbness (28 Apr 2023 17:07)      INTERVAL HISTORY/OVERNIGHT EVENTS: No overnight events. Reports that he feels better compared to yesterday. Still AOx1. Does not remember why he came to hospital and had to re-explain his brain masses to him.       MEDICATIONS:  MEDICATIONS  (STANDING):  chlorhexidine 4% Liquid 1 Application(s) Topical daily  dexAMETHasone     Tablet 4 milliGRAM(s) Oral every 6 hours  folic acid 1 milliGRAM(s) Oral daily  levETIRAcetam 500 milliGRAM(s) Oral two times a day  multivitamin 1 Tablet(s) Oral daily  nicotine -   7 mG/24Hr(s) Patch 1 Patch Transdermal daily    MEDICATIONS  (PRN):  acetaminophen     Tablet .. 650 milliGRAM(s) Oral every 6 hours PRN Temp greater or equal to 38C (100.4F), Mild Pain (1 - 3)  aluminum hydroxide/magnesium hydroxide/simethicone Suspension 30 milliLiter(s) Oral every 4 hours PRN Dyspepsia  melatonin 3 milliGRAM(s) Oral at bedtime PRN Insomnia  ondansetron Injectable 4 milliGRAM(s) IV Push every 8 hours PRN Nausea and/or Vomiting      ALLERGIES: Allergies    No Known Allergies    Intolerances        OBJECTIVE:  ICU Vital Signs Last 24 Hrs  T(C): 36.6 (29 Apr 2023 04:45), Max: 36.6 (28 Apr 2023 14:26)  T(F): 97.8 (29 Apr 2023 04:45), Max: 97.9 (28 Apr 2023 14:26)  HR: 68 (29 Apr 2023 04:45) (68 - 78)  BP: 104/60 (29 Apr 2023 04:45) (104/60 - 126/74)  BP(mean): --  ABP: --  ABP(mean): --  RR: 18 (29 Apr 2023 04:45) (18 - 18)  SpO2: 98% (29 Apr 2023 04:45) (95% - 98%)    O2 Parameters below as of 29 Apr 2023 04:45  Patient On (Oxygen Delivery Method): room air            Adult Advanced Hemodynamics Last 24 Hrs  CVP(mm Hg): --  CVP(cm H2O): --  CO: --  CI: --  PA: --  PA(mean): --  PCWP: --  SVR: --  SVRI: --  PVR: --  PVRI: --  CAPILLARY BLOOD GLUCOSE        CAPILLARY BLOOD GLUCOSE        I&O's Summary    28 Apr 2023 07:01  -  29 Apr 2023 07:00  --------------------------------------------------------  IN: 0 mL / OUT: 500 mL / NET: -500 mL      Daily     Daily     PHYSICAL EXAMINATION:  HEAD:  Atraumatic, Normocephalic  EYES: EOMI, PERRLA, conjunctiva and sclera clear  MOUTH: no oral thrush  NECK: Supple, No JVD  CHEST/LUNG: Clear to auscultation bilaterally; No wheeze  HEART: Regular rate and rhythm; No murmurs, rubs, or gallops  ABDOMEN: Soft, Nontender, Nondistended; Bowel sounds present  EXTREMITIES:  2+ Peripheral Pulses, No clubbing, cyanosis, or edema  NEUROLOGY: AAOx1, RUE and LE 5/5 strength, no deficits on exam   SKIN: No rashes or lesions

## 2023-04-29 NOTE — PROGRESS NOTE ADULT - PROBLEM SELECTOR PLAN 3
Daily consumption of a bottle of rum, unclear when last drink was - pt does not remember   No hx of withdrawal seizures, hallucinations, DTs or hospitalizations  S/p ativan taper overnight, appeared lethargic this AM, d/c   - discontinued ativan for lethargy   - started on multivitamin, folate, thiamine Daily consumption of a bottle of rum, unclear when last drink was - pt does not remember   No hx of withdrawal seizures, hallucinations, DTs or hospitalizations  S/p ativan taper overnight, appeared lethargic this AM, d/c   - discontinued ativan for lethargy     Outside withdrawal window

## 2023-04-29 NOTE — PROGRESS NOTE ADULT - ASSESSMENT
77M with EtOH use disorder, no known PMHx as hasn't seen doctors in decades, presents with R sided weakness/numbness x 2 day, found to have Left parietal mass w/ vasogenic edema. Was placed on ativan taper for EtOH withdrawal however became very lethargic, was discontinued. Pending biopsy of lymph node, however need palliative recs.

## 2023-04-29 NOTE — PROGRESS NOTE ADULT - PROBLEM SELECTOR PLAN 1
R sided weakness/numbness x 2 day, found to have Left parietal mass w/ vasogenic edema  - CTA neck with Left vertebral stenosis but good basilar filling.  - appreciate NSG recs: recommend MRI  MRI head with multiple masses, concerning for mets  CT chest/abd/pelv without signs of other malignancy, does have hilar lymphadenopathy   Pulm consulted for biopsy of lymph node  Ethics consulted for complicated GOC, spouse is the legal decision maker  - f/u palliative recs   - will need onc consult eventually   - continue decadron 4mg q6hr  - continue keppra 500mg BID

## 2023-04-30 NOTE — PROGRESS NOTE ADULT - ATTENDING COMMENTS
This is a 77M with EtOH use disorder, hasn't seen doctors in decades, presents with R sided weakness/numbness x 2 day and AMS. MAR brain shows multiple enhancing masses within the brain, the largest ring enhancing mass measuring 2.9 x 2.7 x 3.0 cm in the LEFT parietal lobe with moderate surrounding edema which demonstrates mass effect on the posterior horn of the LEFT lateral ventricle. A 1.4 cm lesion is medially with mild surrounding edema and a 9 mm ring-enhancing lesion is seen in the RIGHT occipital cortex with minimal edema. 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    1. 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 Mediastinal and hilar adenopathy  3. Alcohol use    - AAOx 1-2, no c/o HA, chest pain, N/V, VSS, confused at time with speech difficulty & poor historian, noted RUE mild weakness, no s/s of ETOH withdrawal  - Reviewed MRI brain and CT c/a/p personally- spoke to NeuroSx- no plans for brain biopsy but rec- Pul or IR for tissue biopsy and started on Decadron 4mg q 6 hrs, AED for seizure ppx  - IR input noted- rec Pulmonary for mediastinal/Hilar LN biopsy  - Appreciated Ethic eval- per rec his domestic partner, Zoe Jones- ([phone]), over 30+ years of partnership with one biological child, will be the legal surrogate who can be contacted for interventions going forward. His sister, Michelle [phone] has been seeing his also.   - appreciated pulmonary plans- awaiting to hear from family for final decision  - Palliative care will re-eval in 2 days and speak to wife  - Oncology input if going forward with biopsy  - Neurocheck q 4 hrs .  - seizure precaution.

## 2023-04-30 NOTE — PROGRESS NOTE ADULT - SUBJECTIVE AND OBJECTIVE BOX
PROGRESS NOTE:   Authoted by Dr. Kathleen Barakat MD    Pager [phone] [facility], [id number] [address]     Patient is a 77y old  Male who presents with a chief complaint of R sided weakness/numbness (29 Apr 2023 07:35)    SUBJECTIVE / OVERNIGHT EVENTS: No significant overnight events. Pt denies chest pain, palpitations, SOB, dizziness/lightheadedhess, syncope, adominal pain, diarrhea/melena/BRBPR, dysuria/hematuria    MEDICATIONS  (STANDING):  chlorhexidine 4% Liquid 1 Application(s) Topical daily  dexAMETHasone     Tablet 4 milliGRAM(s) Oral every 6 hours  folic acid 1 milliGRAM(s) Oral daily  levETIRAcetam 500 milliGRAM(s) Oral two times a day  multivitamin 1 Tablet(s) Oral daily  nicotine -   7 mG/24Hr(s) Patch 1 Patch Transdermal daily    MEDICATIONS  (PRN):  acetaminophen     Tablet .. 650 milliGRAM(s) Oral every 6 hours PRN Temp greater or equal to 38C (100.4F), Mild Pain (1 - 3)  aluminum hydroxide/magnesium hydroxide/simethicone Suspension 30 milliLiter(s) Oral every 4 hours PRN Dyspepsia  melatonin 3 milliGRAM(s) Oral at bedtime PRN Insomnia  ondansetron Injectable 4 milliGRAM(s) IV Push every 8 hours PRN Nausea and/or Vomiting      CAPILLARY BLOOD GLUCOSE        I&O's Summary      PHYSICAL EXAM:  Vital Signs Last 24 Hrs  T(C): 36.4 (30 Apr 2023 05:32), Max: 36.6 (29 Apr 2023 14:45)  T(F): 97.5 (30 Apr 2023 05:32), Max: 97.9 (29 Apr 2023 19:36)  HR: 61 (30 Apr 2023 05:32) (59 - 68)  BP: 114/65 (30 Apr 2023 05:32) (114/65 - 122/78)  RR: 18 (30 Apr 2023 05:32) (18 - 18)  SpO2: 99% (30 Apr 2023 05:32) (96% - 99%)    Parameters below as of 30 Apr 2023 05:32  Patient On (Oxygen Delivery Method): room air    PHYSICAL EXAMINATION:  HEAD:  Atraumatic, Normocephalic  EYES: EOMI, PERRLA, conjunctiva and sclera clear  MOUTH: no oral thrush  NECK: Supple, No JVD  CHEST/LUNG: Clear to auscultation bilaterally; No wheeze  HEART: Regular rate and rhythm; No murmurs, rubs, or gallops  ABDOMEN: Soft, Nontender, Nondistended; Bowel sounds present  EXTREMITIES:  2+ Peripheral Pulses, No clubbing, cyanosis, or edema  NEUROLOGY: AAOx1, RUE and LE 5/5 strength, no deficits on exam   SKIN: No rashes or lesions        LABS:      RADIOLOGY & ADDITIONAL TESTS:  No new imaging or tests    COORDINATION OF CARE:  Care Discussed with Consultants/Other Providers [Y/N]:  Prior or Outpatient Records Reviewed [Y/N]:

## 2023-04-30 NOTE — PROGRESS NOTE ADULT - PROBLEM SELECTOR PLAN 3
Daily consumption of a bottle of rum, unclear when last drink was - pt does not remember   No hx of withdrawal seizures, hallucinations, DTs or hospitalizations  S/p ativan taper overnight, appeared lethargic this AM, d/c   - discontinued ativan for lethargy     Outside withdrawal window

## 2023-05-01 NOTE — PROGRESS NOTE ADULT - SUBJECTIVE AND OBJECTIVE BOX
PATIENT: MARY JARVIS, MRN: [id number]    CHIEF COMPLAINT: Patient is a 77y old  Male who presents with a chief complaint of R sided weakness/numbness (30 Apr 2023 07:39)      INTERVAL HISTORY/OVERNIGHT EVENTS: No overnight events. Patient reports no acute complaints, AOx1     MEDICATIONS:  MEDICATIONS  (STANDING):  chlorhexidine 4% Liquid 1 Application(s) Topical daily  dexAMETHasone     Tablet 4 milliGRAM(s) Oral every 8 hours  folic acid 1 milliGRAM(s) Oral daily  levETIRAcetam 500 milliGRAM(s) Oral two times a day  multivitamin 1 Tablet(s) Oral daily  nicotine -   7 mG/24Hr(s) Patch 1 Patch Transdermal daily    MEDICATIONS  (PRN):  acetaminophen     Tablet .. 650 milliGRAM(s) Oral every 6 hours PRN Temp greater or equal to 38C (100.4F), Mild Pain (1 - 3)  aluminum hydroxide/magnesium hydroxide/simethicone Suspension 30 milliLiter(s) Oral every 4 hours PRN Dyspepsia  melatonin 3 milliGRAM(s) Oral at bedtime PRN Insomnia  ondansetron Injectable 4 milliGRAM(s) IV Push every 8 hours PRN Nausea and/or Vomiting      ALLERGIES: Allergies    No Known Allergies    Intolerances        OBJECTIVE:  ICU Vital Signs Last 24 Hrs  T(C): 36.7 (01 May 2023 05:12), Max: 37.2 (30 Apr 2023 20:43)  T(F): 98 (01 May 2023 05:12), Max: 99 (30 Apr 2023 20:43)  HR: 59 (01 May 2023 05:12) (59 - 93)  BP: 126/76 (01 May 2023 05:12) (102/57 - 126/76)  BP(mean): --  ABP: --  ABP(mean): --  RR: 18 (01 May 2023 05:12) (18 - 18)  SpO2: 100% (01 May 2023 05:12) (98% - 100%)    O2 Parameters below as of 01 May 2023 05:12  Patient On (Oxygen Delivery Method): room air            Adult Advanced Hemodynamics Last 24 Hrs  CVP(mm Hg): --  CVP(cm H2O): --  CO: --  CI: --  PA: --  PA(mean): --  PCWP: --  SVR: --  SVRI: --  PVR: --  PVRI: --  CAPILLARY BLOOD GLUCOSE        CAPILLARY BLOOD GLUCOSE        I&O's Summary    30 Apr 2023 07:01  -  01 May 2023 07:00  --------------------------------------------------------  IN: 0 mL / OUT: 1125 mL / NET: -1125 mL      Daily     Daily     PHYSICAL EXAMINATION:  HEAD:  Atraumatic, Normocephalic  EYES: EOMI, PERRLA, conjunctiva and sclera clear  MOUTH: no oral thrush  NECK: Supple, No JVD  CHEST/LUNG: Clear to auscultation bilaterally; No wheeze  HEART: Regular rate and rhythm; No murmurs, rubs, or gallops  ABDOMEN: Soft, Nontender, Nondistended; Bowel sounds present  EXTREMITIES:  2+ Peripheral Pulses, No clubbing, cyanosis, or edema  NEUROLOGY: AAOx1, RUE and LE 5/5 strength, no deficits on exam   SKIN: No rashes or lesions  Tubes:    LABS:                          15.9   9.65  )-----------( 238      ( 01 May 2023 06:55 )             45.2

## 2023-05-01 NOTE — DIETITIAN INITIAL EVALUATION ADULT - ADD RECOMMEND
1) continue regular diet  2) recommend ensure plus high protein 2x/day vanilla (350 kcal, 20 g Pro/8oz) to supplement diet  3) verify wt and diet hx as able/feasible  4) continue MVI and folic acid supplementation  5) Malnutrition sticker placed, RD to follow-up as per protocol   6) Monitor PO intake, weight, labs, skin, GI status, diet

## 2023-05-01 NOTE — DIETITIAN INITIAL EVALUATION ADULT - REASON FOR ADMISSION
Per chart, Pt is a 76 y/o M with "no known PMH as he does not see a physician, daily alcohol use, "rum", was transferred to ED for evaluation of R hemiparesis x 2 days. Pt reports tingling of R arm prompting him to go to ED. Pt found to have left parietal mass w vasogenic edema. Ethics consulted 4/28 -> spouse would be primary decision maker, son + spouse want to pursue lymph node bx. Pt AOx1."

## 2023-05-01 NOTE — DIETITIAN INITIAL EVALUATION ADULT - ENERGY INTAKE
Adequate (%) Pt reports appetite is good. Observed Pt eating breakfast, consumed 100% of tray. No specific food preferences offered. Amenable to oral nutrition supplements, prefers vanilla.

## 2023-05-01 NOTE — PROGRESS NOTE ADULT - PROBLEM SELECTOR PLAN 1
R sided weakness/numbness x 2 day, found to have Left parietal mass w/ vasogenic edema  - CTA neck with Left vertebral stenosis but good basilar filling.  - appreciate NSG recs: recommend MRI  MRI head with multiple masses, concerning for mets  CT chest/abd/pelv without signs of other malignancy, does have hilar lymphadenopathy   Pulm consulted for biopsy of lymph node  Ethics consulted for complicated GOC, spouse is the legal decision maker  - f/u palliative recs   - will need onc consult eventually   - continue decadron 4mg q8hr, taper  - continue keppra 500mg BID

## 2023-05-01 NOTE — PROGRESS NOTE ADULT - SUBJECTIVE AND OBJECTIVE BOX
MEDICATIONS  (STANDING):  chlorhexidine 4% Liquid 1 Application(s) Topical daily  dexAMETHasone     Tablet 4 milliGRAM(s) Oral every 8 hours  folic acid 1 milliGRAM(s) Oral daily  levETIRAcetam 500 milliGRAM(s) Oral two times a day  multivitamin 1 Tablet(s) Oral daily  nicotine -   7 mG/24Hr(s) Patch 1 Patch Transdermal daily    MEDICATIONS  (PRN):  acetaminophen     Tablet .. 650 milliGRAM(s) Oral every 6 hours PRN Temp greater or equal to 38C (100.4F), Mild Pain (1 - 3)  aluminum hydroxide/magnesium hydroxide/simethicone Suspension 30 milliLiter(s) Oral every 4 hours PRN Dyspepsia  melatonin 3 milliGRAM(s) Oral at bedtime PRN Insomnia  ondansetron Injectable 4 milliGRAM(s) IV Push every 8 hours PRN Nausea and/or Vomiting      Vital Signs Last 24 Hrs  T(C): 36.8 (01 May 2023 14:47), Max: 37.2 (30 Apr 2023 20:43)  T(F): 98.3 (01 May 2023 14:47), Max: 99 (30 Apr 2023 20:43)  HR: 63 (01 May 2023 14:47) (59 - 89)  BP: 101/53 (01 May 2023 14:47) (101/53 - 126/76)  BP(mean): --  RR: 18 (01 May 2023 14:47) (18 - 18)  SpO2: 100% (01 May 2023 14:47) (98% - 100%)    Parameters below as of 01 May 2023 05:12  Patient On (Oxygen Delivery Method): room air                          15.9   9.65  )-----------( 238      ( 01 May 2023 06:55 )             45.2   05-01    135  |  102  |  30<H>  ----------------------------<  109<H>  4.6   |  22  |  1.16    Ca    9.4      01 May 2023 06:56  Phos  2.6     05-01  Mg     2.1     05-01     The chart was reviewed and there were not any acute interval events noticed.     MEDICATIONS  (STANDING):  chlorhexidine 4% Liquid 1 Application(s) Topical daily  dexAMETHasone     Tablet 4 milliGRAM(s) Oral every 8 hours  folic acid 1 milliGRAM(s) Oral daily  levETIRAcetam 500 milliGRAM(s) Oral two times a day  multivitamin 1 Tablet(s) Oral daily  nicotine -   7 mG/24Hr(s) Patch 1 Patch Transdermal daily    MEDICATIONS  (PRN):  acetaminophen     Tablet .. 650 milliGRAM(s) Oral every 6 hours PRN Temp greater or equal to 38C (100.4F), Mild Pain (1 - 3)  aluminum hydroxide/magnesium hydroxide/simethicone Suspension 30 milliLiter(s) Oral every 4 hours PRN Dyspepsia  melatonin 3 milliGRAM(s) Oral at bedtime PRN Insomnia  ondansetron Injectable 4 milliGRAM(s) IV Push every 8 hours PRN Nausea and/or Vomiting      Vital Signs Last 24 Hrs  T(C): 36.8 (01 May 2023 14:47), Max: 37.2 (30 Apr 2023 20:43)  T(F): 98.3 (01 May 2023 14:47), Max: 99 (30 Apr 2023 20:43)  HR: 63 (01 May 2023 14:47) (59 - 89)  BP: 101/53 (01 May 2023 14:47) (101/53 - 126/76)  BP(mean): --  RR: 18 (01 May 2023 14:47) (18 - 18)  SpO2: 100% (01 May 2023 14:47) (98% - 100%)    Parameters below as of 01 May 2023 05:12  Patient On (Oxygen Delivery Method): room air                          15.9   9.65  )-----------( 238      ( 01 May 2023 06:55 )             45.2   05-01    135  |  102  |  30<H>  ----------------------------<  109<H>  4.6   |  22  |  1.16    Ca    9.4      01 May 2023 06:56  Phos  2.6     05-01  Mg     2.1     05-01

## 2023-05-01 NOTE — DIETITIAN INITIAL EVALUATION ADULT - PERTINENT LABORATORY DATA
05-01    135  |  102  |  30<H>  ----------------------------<  109<H>  4.6   |  22  |  1.16    Ca    9.4      01 May 2023 06:56  Phos  2.6     05-01  Mg     2.1     05-01

## 2023-05-01 NOTE — DIETITIAN INITIAL EVALUATION ADULT - NSFNSGIIOFT_GEN_A_CORE
Pt denies any N/V/D/C. No BMs documented on RN flowsheets. Pt does not remember when he last had a BM. No documentation of constipation in MD notes. Not ordered for bowel regimen.

## 2023-05-01 NOTE — DIETITIAN INITIAL EVALUATION ADULT - PERTINENT MEDS FT
MEDICATIONS  (STANDING):  chlorhexidine 4% Liquid 1 Application(s) Topical daily  dexAMETHasone     Tablet 4 milliGRAM(s) Oral every 8 hours  folic acid 1 milliGRAM(s) Oral daily  levETIRAcetam 500 milliGRAM(s) Oral two times a day  multivitamin 1 Tablet(s) Oral daily  nicotine -   7 mG/24Hr(s) Patch 1 Patch Transdermal daily    MEDICATIONS  (PRN):  acetaminophen     Tablet .. 650 milliGRAM(s) Oral every 6 hours PRN Temp greater or equal to 38C (100.4F), Mild Pain (1 - 3)  aluminum hydroxide/magnesium hydroxide/simethicone Suspension 30 milliLiter(s) Oral every 4 hours PRN Dyspepsia  melatonin 3 milliGRAM(s) Oral at bedtime PRN Insomnia  ondansetron Injectable 4 milliGRAM(s) IV Push every 8 hours PRN Nausea and/or Vomiting

## 2023-05-01 NOTE — DIETITIAN INITIAL EVALUATION ADULT - PROBLEM SELECTOR PLAN 1
R sided weakness/numbness x 2 day, found to have Left parietal mass w/ vasogenic edema  - CTA neck with Left vertebral stenosis but good basilar filling.  - appreciate NSG recs: recommend MRI  - MRI ordered; pt would need to think about it  - continue decadron 4mg q6hr  - continue keppra 500mg BID  - pt currently refusing any surgical intervention    - CT chest/abd/pelv without signs of other malignancy or mets except lymphdenopathy

## 2023-05-01 NOTE — PROGRESS NOTE ADULT - CONVERSATION DETAILS
full note to f/u.   In brief, I had a detailed d/w the patient's family about his illness (metastatic CA of unknown primary, with thoracic and brain lesions). I indicated that, at this point, a decision regarding a Bx needed to be done. I explained to them that even if a Dx was done, due to the patient's poor performance, nutritional state, and limited social support,  there was a chance DMT was not going to be possible. However, they still wanted a Bx to be performed so a Dx was define and Onc was to give a final say regarding Dx, prognosis, and treatment options, if any.     Jacky, I will try to address code status.         Vaughn Flanagan MD  Associate Chief Geriatrics and Palliative Care (GaP) [facility]   [place] [facility]   , [employer] at [facility]      Please contact me via Teams from Monday through Friday between 9am-5pm. If not answering, please call the palliative care pager [phone]    After 5pm and on weekends, please see the contact information below:    In the event of newly developing, evolving, or worsening symptoms, please contact the Palliative Medicine team via pager (if the patient is at [facility] #[phone] or if the patient is at [facility] #[phone]) The Geriatric and Palliative Medicine service has coverage 24 hours a day/ 7 days a week to provide medical recommendations regarding symptom management needs via telephone I had a detailed d/w the patient's family about his illness (metastatic CA of unknown primary, with thoracic and brain lesions. Also with cachexia and confusion). I indicated that, at this point, a decision regarding a Bx needed to be done. I explained to them that even if a Dx was done, due to the patient's poor performance, nutritional state, and limited social support,  there was a chance DMT was not going to be possible. However, they still wanted a Bx to be performed so a Dx was define and Onc was to give a final say regarding Dx, prognosis, and treatment options, if any.     The primary team was informed about this GOC meeting.     Will still need to address code status.     Vaughn Flanagan MD  Associate Chief Geriatrics and Palliative Care (GaP) [facility]   [place] [facility]   , [employer] at [facility]      Please contact me via Teams from Monday through Friday between 9am-5pm. If not answering, please call the palliative care pager [phone]    After 5pm and on weekends, please see the contact information below:    In the event of newly developing, evolving, or worsening symptoms, please contact the Palliative Medicine team via pager (if the patient is at [facility] #[phone] or if the patient is at [facility] #[phone]) The Geriatric and Palliative Medicine service has coverage 24 hours a day/ 7 days a week to provide medical recommendations regarding symptom management needs via telephone I had a detailed d/w the patient's family about his illness (metastatic CA of unknown primary, with thoracic and brain lesions. Also with cachexia and confusion). I indicated that, at this point, a decision regarding a Bx needed to be done. I explained to them that even if a Dx was done, due to the patient's poor performance, nutritional state, and limited social support,  there was a chance DMT was not going to be possible. However, they still wanted a Bx to be performed so a Dx was define and Onc was to give a final say regarding Dx, prognosis, and treatment options, if any.     The primary team was informed about this GOC meeting.     Will still need to address code status.

## 2023-05-01 NOTE — DIETITIAN INITIAL EVALUATION ADULT - ORAL INTAKE PTA/DIET HISTORY
Chart reviewed, events noted. Pt reports good appetite/PO intake PTA however Pt unable to elaborate on daily PO intake. Question accuracy of Pt's report as Pt AOx1 and hx of daily Etoh use. No family present at bedside at time of RD visit. No issues chewing/swallowing, Pt wears dentures. NKFA. Pt reports  pounds.

## 2023-05-01 NOTE — PROGRESS NOTE ADULT - ATTENDING COMMENTS
This is a 77M with EtOH use disorder, hasn't seen doctors in decades, presents with R sided weakness/numbness x 2 day and AMS. MAR brain shows multiple enhancing masses within the brain, the largest ring enhancing mass measuring 2.9 x 2.7 x 3.0 cm in the LEFT parietal lobe with moderate surrounding edema which demonstrates mass effect on the posterior horn of the LEFT lateral ventricle. A 1.4 cm lesion is medially with mild surrounding edema and a 9 mm ring-enhancing lesion is seen in the RIGHT occipital cortex with minimal edema. 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    1. 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 Mediastinal and hilar adenopathy  3. Alcohol use    - AAOx 1-2, no c/o HA, chest pain, N/V, VSS, confused at time with speech difficulty & poor historian, noted RUE mild weakness, no s/s of ETOH withdrawal  - Reviewed MRI brain and CT c/a/p personally- spoke to NeuroSx- no plans for brain biopsy but rec- Pul or IR for tissue biopsy and started on Decadron 4mg q 6 hrs, AED for seizure ppx  - IR input noted- rec Pulmonary for mediastinal/Hilar LN biopsy  - Appreciated Ethic eval- per rec his domestic partner, Zoe Jones- ([phone]), over 30+ years of partnership with one biological child, will be the legal surrogate who can be contacted for interventions going forward. His sister, Michelle [phone] has been seeing his also.   - appreciated pulmonary plans- awaiting to hear from family for final decision  - Palliative care spoke to his wife, she will speak to other family members and decide on Bronch & EBUS  - Oncology input if going forward with biopsy  - Neurocheck q 4 hrs .  - seizure precaution.

## 2023-05-02 NOTE — PROGRESS NOTE ADULT - SUBJECTIVE AND OBJECTIVE BOX
CHIEF COMPLAINT: Weakness    Interval Events:  Denies difficulty breathing. No Respiratory complaints. Requesting discussion with family regarding potential biopsy.     REVIEW OF SYSTEMS:  Constitutional: [x] negative [ ] fevers [ ] chills [ ] weight loss [ ] weight gain  HEENT: [x] negative [ ] dry eyes [ ] eye irritation [ ] postnasal drip [ ] nasal congestion  CV: [x] negative  [ ] chest pain [ ] orthopnea [ ] palpitations [ ] murmur  Resp: [x] negative [ ] cough [ ] shortness of breath [ ] dyspnea [ ] wheezing [ ] sputum [ ] hemoptysis  GI: [x] negative [ ] nausea [ ] vomiting [ ] diarrhea [ ] constipation [ ] abd pain [ ] dysphagia   : [x] negative [ ] dysuria [ ] nocturia [ ] hematuria [ ] increased urinary frequency  Musculoskeletal: [x] negative [ ] back pain [ ] myalgias [ ] arthralgias [ ] fracture  Skin: [x] negative [ ] rash [ ] itch  Neurological: [x] negative [ ] headache [ ] dizziness [ ] syncope [ ] weakness [ ] numbness  Psychiatric: [x] negative [ ] anxiety [ ] depression  Endocrine: [ ] negative [ ] diabetes [ ] thyroid problem  Hematologic/Lymphatic: [ ] negative [ ] anemia [ ] bleeding problem  Allergic/Immunologic: [ ] negative [ ] itchy eyes [ ] nasal discharge [ ] hives [ ] angioedema  [x] All other systems negative  [ ] Unable to assess ROS because ________    OBJECTIVE:  ICU Vital Signs Last 24 Hrs  T(C): 36.8 (02 May 2023 12:00), Max: 37 (01 May 2023 22:34)  T(F): 98.3 (02 May 2023 12:00), Max: 98.6 (01 May 2023 22:34)  HR: 65 (02 May 2023 12:00) (62 - 68)  BP: 118/64 (02 May 2023 12:00) (112/47 - 118/64)  BP(mean): --  ABP: --  ABP(mean): --  RR: 20 (02 May 2023 12:00) (18 - 20)  SpO2: 96% (02 May 2023 12:00) (96% - 100%)    O2 Parameters below as of 02 May 2023 12:00  Patient On (Oxygen Delivery Method): room air              05-01 @ 07:01  -  05-02 @ 07:00  --------------------------------------------------------  IN: 240 mL / OUT: 250 mL / NET: -10 mL    05-02 @ 07:01  - 05-02 @ 15:36  --------------------------------------------------------  IN: 0 mL / OUT: 550 mL / NET: -550 mL      CAPILLARY BLOOD GLUCOSE          PHYSICAL EXAM:  General: NAD  HEENT: EOMI, PERRL  Neck: Supple  Respiratory: Clear, no increased WOB, or wheezes  Cardiovascular: RRR no mrg  Abdomen: Soft, NT, ND, no rebound or guarding  Extremities: WWP, no edema  Skin: Intact, no rashes  Neurological: Awake and alert, +gait unsteadiness  Psychiatry: Normal affect    HOSPITAL MEDICATIONS:  MEDICATIONS  (STANDING):  chlorhexidine 4% Liquid 1 Application(s) Topical daily  dexAMETHasone     Tablet 4 milliGRAM(s) Oral every 12 hours  folic acid 1 milliGRAM(s) Oral daily  levETIRAcetam 500 milliGRAM(s) Oral two times a day  multivitamin 1 Tablet(s) Oral daily  nicotine -   7 mG/24Hr(s) Patch 1 Patch Transdermal daily    MEDICATIONS  (PRN):  acetaminophen     Tablet .. 650 milliGRAM(s) Oral every 6 hours PRN Temp greater or equal to 38C (100.4F), Mild Pain (1 - 3)  aluminum hydroxide/magnesium hydroxide/simethicone Suspension 30 milliLiter(s) Oral every 4 hours PRN Dyspepsia  melatonin 3 milliGRAM(s) Oral at bedtime PRN Insomnia  ondansetron Injectable 4 milliGRAM(s) IV Push every 8 hours PRN Nausea and/or Vomiting      LABS:                        15.9   9.65  )-----------( 238      ( 01 May 2023 06:55 )             45.2     Hgb Trend: 15.9<--, 15.1<--, 15.1<--  05-01    135  |  102  |  30<H>  ----------------------------<  109<H>  4.6   |  22  |  1.16    Ca    9.4      01 May 2023 06:56  Phos  2.6     05-01  Mg     2.1     05-01      Creatinine Trend: 1.16<--, 1.00<--, 1.07<--, 1.20<--, 1.30<--  PT/INR - ( 01 May 2023 06:57 )   PT: 11.5 sec;   INR: 1.00 ratio         PTT - ( 01 May 2023 06:57 )  PTT:24.3 sec          MICROBIOLOGY:       RADIOLOGY:  [ ] Reviewed and interpreted by me    PULMONARY FUNCTION TESTS:    EKG:

## 2023-05-02 NOTE — PROGRESS NOTE ADULT - ASSESSMENT
77M with EtOH use disorder, no known PMHx as hasn't seen doctors in decades, presents with R sided weakness/numbness x 2 day, found to have Left parietal mass w/ vasogenic edema. Was placed on ativan taper for EtOH withdrawal however became very lethargic, was discontinued. Pending biopsy of lymph node, family/surrogate agreed to pursue biopsy.

## 2023-05-02 NOTE — PROGRESS NOTE ADULT - ATTENDING COMMENTS
Patient seen and examined. Patient is a 77M active EtOH and tobacco use presenting with neurologic symptoms. He was found to have multiple brain lesions on imaging and mediastinal adenopathy on CT scan. Pulmonary has been re-consulted for evaluation of EBUS/Bronchoscopy now that patient has had clinical improvement and is no longer having signs/symptoms of withdrawal.    On discussion with patient he was unsure if he wanted to pursue any further procedure but asked to discuss with his family further. Ethics and Palliative notes reviewed from this hospitalization.    Patient tells me that he smokes only when he drinks but that he drinks daily when at home. He denies any history of lung disease and denies being on any pulmonary medications. He tells me that today he does not have any respiratory distress (no shortness of breath, cough, or sputum production). He denies weight loss or night sweats.     He has been started on Dexamethasone for his brain lesions.    His CT chest also notes significant emphysema, mucus in his airway, and a cluster of nonspecific nodules/tree-in-bud opacities. He denies aspiration symptoms and states that his appetite is good.    #Mediastinal Adenopathy  #Brain Masses  #Emphysema  #Abnormal CT Chest    - Will discuss with patient and tentatively plan for EBUS with Dr. Silva later this week - will need patient and/or family to provide consent (preferably both)  - There are no dominant masses or lesions in the lung  - Outpatient PET/CT for further malignancy workup - and management of brain lesions as per Neurosurgery. No plans for biopsy/intervention at this time per notes.  - Smoking cessation will be important but patient is not motivated to quit on our discussion today.  - Emphysema noted on CT scan and patient likely with obstructive lung disease. Should have outpatient PFTs  - Would consider starting Symbicort BID and Spiriva in hospital given suspicion for obstructive lung disease  - Continue nicotine patch  - Incentive spirometer and acapella to facilitate breathing and airway clearance. Chest PT  - Maintain O2 sat > 88%. Check ambulatory O2 saturation prior to discharge to determine if he requires home O2.  - DVT prophylaxis    Prognosis guarded.

## 2023-05-02 NOTE — PROGRESS NOTE ADULT - SUBJECTIVE AND OBJECTIVE BOX
PATIENT: MARY JARVIS, MRN: [id number]    CHIEF COMPLAINT: Patient is a 77y old  Male who presents with a chief complaint of R sided weakness/numbness (01 May 2023 19:46)      INTERVAL HISTORY/OVERNIGHT EVENTS: No overnight events.     MEDICATIONS:  MEDICATIONS  (STANDING):  chlorhexidine 4% Liquid 1 Application(s) Topical daily  dexAMETHasone     Tablet 4 milliGRAM(s) Oral every 8 hours  folic acid 1 milliGRAM(s) Oral daily  levETIRAcetam 500 milliGRAM(s) Oral two times a day  multivitamin 1 Tablet(s) Oral daily  nicotine -   7 mG/24Hr(s) Patch 1 Patch Transdermal daily    MEDICATIONS  (PRN):  acetaminophen     Tablet .. 650 milliGRAM(s) Oral every 6 hours PRN Temp greater or equal to 38C (100.4F), Mild Pain (1 - 3)  aluminum hydroxide/magnesium hydroxide/simethicone Suspension 30 milliLiter(s) Oral every 4 hours PRN Dyspepsia  melatonin 3 milliGRAM(s) Oral at bedtime PRN Insomnia  ondansetron Injectable 4 milliGRAM(s) IV Push every 8 hours PRN Nausea and/or Vomiting      ALLERGIES: Allergies    No Known Allergies    Intolerances        OBJECTIVE:  ICU Vital Signs Last 24 Hrs  T(C): 36.5 (02 May 2023 05:33), Max: 37 (01 May 2023 22:34)  T(F): 97.7 (02 May 2023 05:33), Max: 98.6 (01 May 2023 22:34)  HR: 62 (02 May 2023 05:33) (62 - 68)  BP: 116/65 (02 May 2023 05:33) (101/53 - 116/65)  BP(mean): --  ABP: --  ABP(mean): --  RR: 18 (02 May 2023 05:33) (18 - 18)  SpO2: 99% (02 May 2023 05:33) (99% - 100%)    O2 Parameters below as of 02 May 2023 05:33  Patient On (Oxygen Delivery Method): room air            Adult Advanced Hemodynamics Last 24 Hrs  CVP(mm Hg): --  CVP(cm H2O): --  CO: --  CI: --  PA: --  PA(mean): --  PCWP: --  SVR: --  SVRI: --  PVR: --  PVRI: --  CAPILLARY BLOOD GLUCOSE        CAPILLARY BLOOD GLUCOSE        I&O's Summary    30 Apr 2023 07:01  -  01 May 2023 07:00  --------------------------------------------------------  IN: 0 mL / OUT: 1125 mL / NET: -1125 mL    01 May 2023 07:01  -  02 May 2023 06:58  --------------------------------------------------------  IN: 240 mL / OUT: 250 mL / NET: -10 mL      Daily     Daily     PHYSICAL EXAMINATION:  HEAD:  Atraumatic, Normocephalic  EYES: EOMI, PERRLA, conjunctiva and sclera clear  MOUTH: no oral thrush  NECK: Supple, No JVD  CHEST/LUNG: Clear to auscultation bilaterally; No wheeze  HEART: Regular rate and rhythm; No murmurs, rubs, or gallops  ABDOMEN: Soft, Nontender, Nondistended; Bowel sounds present  EXTREMITIES:  2+ Peripheral Pulses, No clubbing, cyanosis, or edema  NEUROLOGY: AAOx1, RUE and LE 5/5 strength, no deficits on exam   SKIN: No rashes or lesions  LABS:                          15.9   9.65  )-----------( 238      ( 01 May 2023 06:55 )             45.2     05-01    135  |  102  |  30<H>  ----------------------------<  109<H>  4.6   |  22  |  1.16    Ca    9.4      01 May 2023 06:56  Phos  2.6     05-01  Mg     2.1     05-01        PT/INR - ( 01 May 2023 06:57 )   PT: 11.5 sec;   INR: 1.00 ratio         PTT - ( 01 May 2023 06:57 )  PTT:24.3 sec

## 2023-05-02 NOTE — PROGRESS NOTE ADULT - PROBLEM SELECTOR PLAN 4
Seen by palliative care  Surrogate/family want to pursue biopsy for pt for tissue dx  - contacted pulm for biopsy

## 2023-05-02 NOTE — CHART NOTE - NSCHARTNOTEFT_GEN_A_CORE
The neurosurgical plan will be for frameless hyperfractionated treatment - likely 3 fractions. This radiosurgical treatment will have to be done as outpatient so patient will need to be discharged after the lymph node biopsy.    Neurosurgery   [id number]

## 2023-05-02 NOTE — PROGRESS NOTE ADULT - ASSESSMENT
76YO Male PMH active tobacco and EtOH use disorder presented with R sided weakness/numbness x 2 days. Found to have CT head showing Left parietal mass w/ vasogenic edema, no sig MLS. Subsequent CT chest showing emphysema, mediastinal and hilar adenopathy. Pulmonary consulted for evaluation of Mediastinal and hilar adenopathy.     #Concern for metastatic disease  #CT findings of Emphysema  - Pt with Parietal mass w/ vasogenic edema with mediastinal and hilar adenopathy  - Paratracheal (station 4R) and Hilar (station 10R) nodes are amenable to EBUS guided bronchoscopic biopsy  - No active signs of withdrawal on exam, no longer on benzodiazepenes  - Pt preferring to discuss further with family regarding EBUS biopsy. Will tentatively schedule for Friday morning at 8AM pending further discussion. GOC and ethics note appreciated.  - pulmonary will follow    Wilmer Martinez MD  PGY-6  PCCM Fellow  Pager [phone]

## 2023-05-02 NOTE — PROGRESS NOTE ADULT - ATTENDING COMMENTS
This is a 77M with EtOH use disorder, hasn't seen doctors in decades, presents with R sided weakness/numbness x 2 day and AMS. MAR brain shows multiple enhancing masses within the brain, the largest ring enhancing mass measuring 2.9 x 2.7 x 3.0 cm in the LEFT parietal lobe with moderate surrounding edema which demonstrates mass effect on the posterior horn of the LEFT lateral ventricle. A 1.4 cm lesion is medially with mild surrounding edema and a 9 mm ring-enhancing lesion is seen in the RIGHT occipital cortex with minimal edema. 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    1. 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 Mediastinal and hilar adenopathy  3. Alcohol use    - Feels ok, no HA, AAOx 1-2, VSS, confused at time with speech difficulty & poor historian, noted RUE mild weakness, no s/s of ETOH withdrawal  - Reviewed MRI brain and CT c/a/p personally- spoke to NeuroSx- no plans for brain biopsy but rec- Pul or IR for tissue biopsy and decreased Decadron 4mg q 12hrs, AED for seizure ppx  - Palliative care spoke to his wife, patient will need Bronch & EBUS for Mediastinal and Hilar LN biopsy. Pulmonary f/u to be noted   - Appreciated Ethic eval- per rec his domestic partner, Zoe Jones- ([phone]), over 30+ years of partnership with one biological child, will be the legal surrogate who can be contacted for interventions going forward. His sister, Michelle [phone] has been seeing his also.   - appreciated pulmonary plans- awaiting to hear from family for final decision  - Oncology consult post biopsy  - Neurocheck q 4 hrs .  - seizure precaution  ** spoke to family

## 2023-05-03 NOTE — PROGRESS NOTE ADULT - ASSESSMENT
78YO Male PMH active tobacco and EtOH use disorder presented with R sided weakness/numbness x 2 days. Found to have CT head showing Left parietal mass w/ vasogenic edema, no sig MLS. Subsequent CT chest showing emphysema, mediastinal and hilar adenopathy. Pulmonary consulted for evaluation of Mediastinal and hilar adenopathy.     #Concern for metastatic disease  #CT findings of Emphysema  - Pt with Parietal mass w/ vasogenic edema with mediastinal and hilar adenopathy  - Paratracheal (station 4R) and Hilar (station 10R) nodes are amenable to EBUS guided bronchoscopic biopsy  - No active signs of withdrawal on exam, no longer on benzodiazepenes  - Will tentatively schedule for Friday morning at 8AM. To discuss with patient's surrogate today. GOC and ethics note appreciated.  - pulmonary will follow    Wilmer Martinez MD  PGY-6  PCCM Fellow  Pager [phone]

## 2023-05-03 NOTE — PROGRESS NOTE ADULT - SUBJECTIVE AND OBJECTIVE BOX
CHIEF COMPLAINT: Weakness, numbness    Interval Events:  No respiratory complaints today. Reporting that he is still pending discussion with family to finalize biopsy plan.     REVIEW OF SYSTEMS:  Constitutional: [x] negative [ ] fevers [ ] chills [ ] weight loss [ ] weight gain  HEENT: [x] negative [ ] dry eyes [ ] eye irritation [ ] postnasal drip [ ] nasal congestion  CV: [x] negative  [ ] chest pain [ ] orthopnea [ ] palpitations [ ] murmur  Resp: [x] negative [ ] cough [ ] shortness of breath [ ] dyspnea [ ] wheezing [ ] sputum [ ] hemoptysis  GI: [x] negative [ ] nausea [ ] vomiting [ ] diarrhea [ ] constipation [ ] abd pain [ ] dysphagia   : [x] negative [ ] dysuria [ ] nocturia [ ] hematuria [ ] increased urinary frequency  Musculoskeletal: [x] negative [ ] back pain [ ] myalgias [ ] arthralgias [ ] fracture  Skin: [x] negative [ ] rash [ ] itch  Neurological: [x] negative [ ] headache [ ] dizziness [ ] syncope [ ] weakness [ ] numbness  Psychiatric: [x] negative [ ] anxiety [ ] depression  Endocrine: [ ] negative [ ] diabetes [ ] thyroid problem  Hematologic/Lymphatic: [ ] negative [ ] anemia [ ] bleeding problem  Allergic/Immunologic: [ ] negative [ ] itchy eyes [ ] nasal discharge [ ] hives [ ] angioedema  [x] All other systems negative  [ ] Unable to assess ROS because ________    OBJECTIVE:  ICU Vital Signs Last 24 Hrs  T(C): 36.9 (03 May 2023 04:14), Max: 36.9 (03 May 2023 04:14)  T(F): 98.4 (03 May 2023 04:14), Max: 98.4 (03 May 2023 04:14)  HR: 66 (03 May 2023 04:14) (65 - 73)  BP: 103/64 (03 May 2023 04:14) (103/64 - 118/64)  BP(mean): --  ABP: --  ABP(mean): --  RR: 18 (03 May 2023 04:14) (18 - 20)  SpO2: 99% (03 May 2023 04:14) (92% - 99%)    O2 Parameters below as of 03 May 2023 04:14  Patient On (Oxygen Delivery Method): room air              05-02 @ 07:01  -  05-03 @ 07:00  --------------------------------------------------------  IN: 0 mL / OUT: 2150 mL / NET: -2150 mL      CAPILLARY BLOOD GLUCOSE          PHYSICAL EXAM:  General: NAD  HEENT: EOMI, PERRL  Neck: Supple  Respiratory: Clear, no increased WOB, or wheezes  Cardiovascular: RRR no mrg  Abdomen: Soft, NT, ND, no rebound or guarding  Extremities: WWP, no edema  Skin: Intact, no rashes  Neurological: Gait instability  Psychiatry: Normal affect    HOSPITAL MEDICATIONS:  MEDICATIONS  (STANDING):  budesonide  80 MICROgram(s)/formoterol 4.5 MICROgram(s) Inhaler 2 Puff(s) Inhalation two times a day  chlorhexidine 4% Liquid 1 Application(s) Topical daily  dexAMETHasone     Tablet 4 milliGRAM(s) Oral every 12 hours  folic acid 1 milliGRAM(s) Oral daily  levETIRAcetam 500 milliGRAM(s) Oral two times a day  multivitamin 1 Tablet(s) Oral daily  nicotine -   7 mG/24Hr(s) Patch 1 Patch Transdermal daily  tiotropium 2.5 MICROgram(s) Inhaler 2 Puff(s) Inhalation daily    MEDICATIONS  (PRN):  acetaminophen     Tablet .. 650 milliGRAM(s) Oral every 6 hours PRN Temp greater or equal to 38C (100.4F), Mild Pain (1 - 3)  aluminum hydroxide/magnesium hydroxide/simethicone Suspension 30 milliLiter(s) Oral every 4 hours PRN Dyspepsia  melatonin 3 milliGRAM(s) Oral at bedtime PRN Insomnia  ondansetron Injectable 4 milliGRAM(s) IV Push every 8 hours PRN Nausea and/or Vomiting      LABS:    Hgb Trend: 15.9<--, 15.1<--        Creatinine Trend: 1.16<--, 1.00<--, 1.07<--, 1.20<--, 1.30<--            MICROBIOLOGY:       RADIOLOGY:  [ ] Reviewed and interpreted by me    PULMONARY FUNCTION TESTS:    EKG:

## 2023-05-03 NOTE — PROGRESS NOTE ADULT - ATTENDING COMMENTS
This is a 77M with EtOH use disorder, hasn't seen doctors in decades, presents with R sided weakness/numbness x 2 day and AMS. MAR brain shows multiple enhancing masses within the brain, the largest ring enhancing mass measuring 2.9 x 2.7 x 3.0 cm in the LEFT parietal lobe with moderate surrounding edema which demonstrates mass effect on the posterior horn of the LEFT lateral ventricle. A 1.4 cm lesion is medially with mild surrounding edema and a 9 mm ring-enhancing lesion is seen in the RIGHT occipital cortex with minimal edema. 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    1. 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 Mediastinal and hilar adenopathy  3. Alcohol use    - Feels ok, no HA, AAOx 1-2, VSS, confused at time with speech difficulty & poor historian, noted RUE mild weakness, no s/s of ETOH withdrawal  - Reviewed MRI brain and CT c/a/p personally- spoke to NeuroSx- no plans for brain biopsy but rec- Pul or IR for tissue biopsy and decreased Decadron 4mg q 12hrs, AED for seizure ppx  - Palliative care spoke to his wife, patient will need Bronch & EBUS for Mediastinal and Hilar LN biopsy.   - Pulmonary f/u to be noted- Broncho & biopsy, EBUS in 2 days  - Appreciated Ethic eval- per rec his domestic partner, Zoe Jones- ([phone]), over 30+ years of partnership with one biological child, will be the legal surrogate who can be contacted for interventions going forward. His sister, Michelle [phone] has been seeing his also.   - appreciated pulmonary plans- awaiting to hear from family for final decision  - Oncology consult post biopsy  - Neurocheck q 4 hrs .  - seizure precaution  ** spoke to family

## 2023-05-03 NOTE — PROGRESS NOTE ADULT - SUBJECTIVE AND OBJECTIVE BOX
PATIENT: MARY JARVIS, MRN: [id number]    CHIEF COMPLAINT: Patient is a 77y old  Male who presents with a chief complaint of R sided weakness/numbness (02 May 2023 10:05)      INTERVAL HISTORY/OVERNIGHT EVENTS: No overnight events. Patient AOx1 this morning, no acute complaints at bedside today.     MEDICATIONS:  MEDICATIONS  (STANDING):  budesonide  80 MICROgram(s)/formoterol 4.5 MICROgram(s) Inhaler 2 Puff(s) Inhalation two times a day  chlorhexidine 4% Liquid 1 Application(s) Topical daily  dexAMETHasone     Tablet 4 milliGRAM(s) Oral every 12 hours  folic acid 1 milliGRAM(s) Oral daily  levETIRAcetam 500 milliGRAM(s) Oral two times a day  multivitamin 1 Tablet(s) Oral daily  nicotine -   7 mG/24Hr(s) Patch 1 Patch Transdermal daily  tiotropium 2.5 MICROgram(s) Inhaler 2 Puff(s) Inhalation daily    MEDICATIONS  (PRN):  acetaminophen     Tablet .. 650 milliGRAM(s) Oral every 6 hours PRN Temp greater or equal to 38C (100.4F), Mild Pain (1 - 3)  aluminum hydroxide/magnesium hydroxide/simethicone Suspension 30 milliLiter(s) Oral every 4 hours PRN Dyspepsia  melatonin 3 milliGRAM(s) Oral at bedtime PRN Insomnia  ondansetron Injectable 4 milliGRAM(s) IV Push every 8 hours PRN Nausea and/or Vomiting      ALLERGIES: Allergies    No Known Allergies    Intolerances        OBJECTIVE:  ICU Vital Signs Last 24 Hrs  T(C): 36.9 (03 May 2023 04:14), Max: 36.9 (03 May 2023 04:14)  T(F): 98.4 (03 May 2023 04:14), Max: 98.4 (03 May 2023 04:14)  HR: 66 (03 May 2023 04:14) (65 - 73)  BP: 103/64 (03 May 2023 04:14) (103/64 - 118/64)  BP(mean): --  ABP: --  ABP(mean): --  RR: 18 (03 May 2023 04:14) (18 - 20)  SpO2: 99% (03 May 2023 04:14) (92% - 99%)    O2 Parameters below as of 03 May 2023 04:14  Patient On (Oxygen Delivery Method): room air            Adult Advanced Hemodynamics Last 24 Hrs  CVP(mm Hg): --  CVP(cm H2O): --  CO: --  CI: --  PA: --  PA(mean): --  PCWP: --  SVR: --  SVRI: --  PVR: --  PVRI: --  CAPILLARY BLOOD GLUCOSE        CAPILLARY BLOOD GLUCOSE        I&O's Summary    02 May 2023 07:01  -  03 May 2023 07:00  --------------------------------------------------------  IN: 0 mL / OUT: 2150 mL / NET: -2150 mL      Daily     Daily     PHYSICAL EXAMINATION:  HEAD:  Atraumatic, Normocephalic  EYES: EOMI, PERRLA, conjunctiva and sclera clear  MOUTH: no oral thrush  NECK: Supple, No JVD  CHEST/LUNG: Clear to auscultation bilaterally; No wheeze  HEART: Regular rate and rhythm; No murmurs, rubs, or gallops  ABDOMEN: Soft, Nontender, Nondistended; Bowel sounds present  EXTREMITIES:  2+ Peripheral Pulses, No clubbing, cyanosis, or edema  NEUROLOGY: AAOx1, RUE and LE 5/5 strength, no deficits on exam   SKIN: No rashes or lesions    LABS:

## 2023-05-03 NOTE — PROGRESS NOTE ADULT - PROBLEM SELECTOR PLAN 1
R sided weakness/numbness x 2 day, found to have Left parietal mass w/ vasogenic edema  - CTA neck with Left vertebral stenosis but good basilar filling.  - appreciate NSG recs: recommend MRI  MRI head with multiple masses, concerning for mets  CT chest/abd/pelv without signs of other malignancy, does have hilar lymphadenopathy   Ethics consulted for complicated GOC, spouse is the legal decision maker  - Pulm plans for biopsy of lymph node this week   - f/u palliative recs   - will need onc consult eventually   - continue decadron 4mg q8hr, taper  - continue keppra 500mg BID

## 2023-05-03 NOTE — PROGRESS NOTE ADULT - ATTENDING COMMENTS
Patient seen and examined. Patient is a 77M active EtOH and tobacco use presenting with neurologic symptoms. He was found to have multiple brain lesions on imaging and mediastinal adenopathy on CT scan. Pulmonary has been re-consulted for evaluation of EBUS/Bronchoscopy now that patient has had clinical improvement and is no longer having signs/symptoms of withdrawal.    - Dr. Martinez discussed the case and plan for potential bronchoscopic procedure with patient's surrogate, Zoe, who was amenable to proceed and willing to provide consent if necessary.    He has been started on Dexamethasone for his brain lesions.    His CT chest also notes significant emphysema, mucus in his airway, and a cluster of nonspecific nodules/tree-in-bud opacities. He denies aspiration symptoms and states that his appetite is good.    #Mediastinal Adenopathy  #Brain Masses  #Emphysema  #Abnormal CT Chest    - Plan for EBUS with Dr. Silva later this week - tentatively on Friday  - There are no dominant masses or lesions in the lung  - Outpatient PET/CT for further malignancy workup - and management of brain lesions as per Neurosurgery. No plans for biopsy/intervention at this time per notes.  - Smoking cessation will be important but patient is not motivated to quit on our discussion today.  - Emphysema noted on CT scan and patient likely with obstructive lung disease. Should have outpatient PFTs  - Started on Symbicort BID and Spiriva in hospital given suspicion for obstructive lung disease  - Continue nicotine patch  - Incentive spirometer and acapella to facilitate breathing and airway clearance. Chest PT  - Maintain O2 sat > 88%. Check ambulatory O2 saturation prior to discharge to determine if he requires home O2.  - DVT prophylaxis    Prognosis guarded.

## 2023-05-04 NOTE — PROGRESS NOTE ADULT - ATTENDING COMMENTS
This is a 77M with EtOH use disorder, hasn't seen doctors in decades, presents with R sided weakness/numbness x 2 day and AMS. MAR brain shows multiple enhancing masses within the brain, the largest ring enhancing mass measuring 2.9 x 2.7 x 3.0 cm in the LEFT parietal lobe with moderate surrounding edema which demonstrates mass effect on the posterior horn of the LEFT lateral ventricle. A 1.4 cm lesion is medially with mild surrounding edema and a 9 mm ring-enhancing lesion is seen in the RIGHT occipital cortex with minimal edema. 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    1. 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 Mediastinal and hilar adenopathy  3. Alcohol use    - Feels ok, no HA, AAOx 1-2, VSS, confused at time with speech difficulty & poor historian, noted RUE mild weakness, no s/s of ETOH withdrawal  - Reviewed MRI brain and CT c/a/p personally- spoke to NeuroSx- no plans for brain biopsy but rec- Pul or IR for tissue biopsy and decreased Decadron 4mg q 12hrs, AED for seizure ppx  - Palliative care spoke to his wife, patient will need Bronch & EBUS for Mediastinal and Hilar LN biopsy.   - Pulmonary plans for Broncho & biopsy, EBUS tomorrow, NPO p MN  - Appreciated Ethic eval- per rec his domestic partner, Zoe Jones- ([phone]), over 30+ years of partnership with one biological child, will be the legal surrogate who can be contacted for interventions going forward. His sister, Michelle [phone] has been seeing his also.   - appreciated pulmonary plans- awaiting to hear from family for final decision  - Oncology consult post biopsy  - Neurocheck q 4 hrs .  - seizure precaution  ** Team spoke to family

## 2023-05-04 NOTE — PROGRESS NOTE ADULT - SUBJECTIVE AND OBJECTIVE BOX
CHIEF COMPLAINT: Weakness    Interval Events:  Feels well this morning, no complaints. Discussed plan for bronchoscopy for lymph node biopsy as part of diagnostic workup - patient amenable.     REVIEW OF SYSTEMS:  Constitutional: [x] negative [ ] fevers [ ] chills [ ] weight loss [ ] weight gain  HEENT: [x] negative [ ] dry eyes [ ] eye irritation [ ] postnasal drip [ ] nasal congestion  CV: [x] negative  [ ] chest pain [ ] orthopnea [ ] palpitations [ ] murmur  Resp: [x] negative [ ] cough [ ] shortness of breath [ ] dyspnea [ ] wheezing [ ] sputum [ ] hemoptysis  GI: [x] negative [ ] nausea [ ] vomiting [ ] diarrhea [ ] constipation [ ] abd pain [ ] dysphagia   : [x] negative [ ] dysuria [ ] nocturia [ ] hematuria [ ] increased urinary frequency  Musculoskeletal: [x] negative [ ] back pain [ ] myalgias [ ] arthralgias [ ] fracture  Skin: [x] negative [ ] rash [ ] itch  Neurological: [x] negative [ ] headache [ ] dizziness [ ] syncope [ ] weakness [ ] numbness  Psychiatric: [x] negative [ ] anxiety [ ] depression  Endocrine: [ ] negative [ ] diabetes [ ] thyroid problem  Hematologic/Lymphatic: [ ] negative [ ] anemia [ ] bleeding problem  Allergic/Immunologic: [ ] negative [ ] itchy eyes [ ] nasal discharge [ ] hives [ ] angioedema  [x] All other systems negative  [ ] Unable to assess ROS because ________    OBJECTIVE:  ICU Vital Signs Last 24 Hrs  T(C): 36.3 (04 May 2023 04:39), Max: 36.8 (03 May 2023 12:06)  T(F): 97.4 (04 May 2023 04:39), Max: 98.2 (03 May 2023 12:06)  HR: 60 (04 May 2023 04:39) (55 - 74)  BP: 113/63 (04 May 2023 04:39) (110/68 - 115/69)  BP(mean): --  ABP: --  ABP(mean): --  RR: 18 (04 May 2023 04:39) (18 - 18)  SpO2: 97% (04 May 2023 04:39) (97% - 100%)    O2 Parameters below as of 04 May 2023 04:39  Patient On (Oxygen Delivery Method): room air              05-03 @ 07:01  -  05-04 @ 07:00  --------------------------------------------------------  IN: 400 mL / OUT: 401 mL / NET: -1 mL      CAPILLARY BLOOD GLUCOSE          PHYSICAL EXAM:  General: Male, NAD  HEENT: EOMI, PERRL  Neck: Supple  Respiratory: Clear, no increased WOB, or wheezes  Cardiovascular: RRR no mrg  Abdomen: Soft, NT, ND, no rebound or guarding  Extremities: WWP, no edema  Skin: Intact, no rashes  Neurological: Unsteadiness on ambulation  Psychiatry: Normal affect    HOSPITAL MEDICATIONS:  MEDICATIONS  (STANDING):  budesonide  80 MICROgram(s)/formoterol 4.5 MICROgram(s) Inhaler 2 Puff(s) Inhalation two times a day  chlorhexidine 4% Liquid 1 Application(s) Topical daily  dexAMETHasone     Tablet 4 milliGRAM(s) Oral every 12 hours  folic acid 1 milliGRAM(s) Oral daily  levETIRAcetam 500 milliGRAM(s) Oral two times a day  multivitamin 1 Tablet(s) Oral daily  nicotine -   7 mG/24Hr(s) Patch 1 Patch Transdermal daily  tiotropium 2.5 MICROgram(s) Inhaler 2 Puff(s) Inhalation daily    MEDICATIONS  (PRN):  acetaminophen     Tablet .. 650 milliGRAM(s) Oral every 6 hours PRN Temp greater or equal to 38C (100.4F), Mild Pain (1 - 3)  aluminum hydroxide/magnesium hydroxide/simethicone Suspension 30 milliLiter(s) Oral every 4 hours PRN Dyspepsia  melatonin 3 milliGRAM(s) Oral at bedtime PRN Insomnia  ondansetron Injectable 4 milliGRAM(s) IV Push every 8 hours PRN Nausea and/or Vomiting      LABS:    Hgb Trend: 15.9<--        Creatinine Trend: 1.16<--, 1.00<--, 1.07<--, 1.20<--, 1.30<--            MICROBIOLOGY:       RADIOLOGY:  [ ] Reviewed and interpreted by me    PULMONARY FUNCTION TESTS:    EKG:

## 2023-05-04 NOTE — PROGRESS NOTE ADULT - ATTENDING COMMENTS
Patient seen and examined. Patient is a 77M active EtOH and tobacco use presenting with neurologic symptoms. He was found to have multiple brain lesions on imaging and mediastinal adenopathy on CT scan. Pulmonary has been re-consulted for evaluation of EBUS/Bronchoscopy now that patient has had clinical improvement and is no longer having signs/symptoms of withdrawal.    Patient is amenable for bronchoscopy tomorrow on discussion today. We discussed case with patient's surrogate (Zoe) on 5/3 who was also in agreement.    His CT chest also notes significant emphysema, mucus in his airway, and a cluster of nonspecific nodules/tree-in-bud opacities. He denies aspiration symptoms and states that his appetite is good.    #Mediastinal Adenopathy  #Brain Masses  #Emphysema  #Abnormal CT Chest    - Plan for EBUS with Dr. Silva tomorrow. Please keep NPO past midnight (patient aware of this)  - There are no dominant masses or lesions in the lung but given brain lesions small cell is a concerns  - Outpatient PET/CT for further malignancy workup - and management of brain lesions as per Neurosurgery. No plans for biopsy/intervention at this time per notes. Continue Decadron as per Neurosurgery.  - Smoking cessation will be important but patient is not motivated to quit on our discussion today.  - Emphysema noted on CT scan and patient likely with obstructive lung disease. Should have outpatient PFTs  - Started on Symbicort BID and Spiriva in hospital given suspicion for obstructive lung disease  - Continue nicotine patch  - Incentive spirometer and acapella to facilitate breathing and airway clearance. Chest PT  - Maintain O2 sat > 88%. Check ambulatory O2 saturation prior to discharge to determine if he requires home O2.  - DVT prophylaxis    Prognosis guarded.

## 2023-05-04 NOTE — PROGRESS NOTE ADULT - SUBJECTIVE AND OBJECTIVE BOX
Prashant Chang MD, MPH, PGY3  *Please page Night Float after 7 PM*    PROGRESS NOTE:     Patient is a 77y old  Male who presents with a chief complaint of R sided weakness/numbness (03 May 2023 07:57)      SUBJECTIVE / OVERNIGHT EVENTS: NAEO. Patient with no acute complaints, feeling well today. ROS otherwise negative.      MEDICATIONS  (STANDING):  budesonide  80 MICROgram(s)/formoterol 4.5 MICROgram(s) Inhaler 2 Puff(s) Inhalation two times a day  chlorhexidine 4% Liquid 1 Application(s) Topical daily  dexAMETHasone     Tablet 4 milliGRAM(s) Oral every 12 hours  folic acid 1 milliGRAM(s) Oral daily  levETIRAcetam 500 milliGRAM(s) Oral two times a day  multivitamin 1 Tablet(s) Oral daily  nicotine -   7 mG/24Hr(s) Patch 1 Patch Transdermal daily  tiotropium 2.5 MICROgram(s) Inhaler 2 Puff(s) Inhalation daily    MEDICATIONS  (PRN):  acetaminophen     Tablet .. 650 milliGRAM(s) Oral every 6 hours PRN Temp greater or equal to 38C (100.4F), Mild Pain (1 - 3)  aluminum hydroxide/magnesium hydroxide/simethicone Suspension 30 milliLiter(s) Oral every 4 hours PRN Dyspepsia  melatonin 3 milliGRAM(s) Oral at bedtime PRN Insomnia  ondansetron Injectable 4 milliGRAM(s) IV Push every 8 hours PRN Nausea and/or Vomiting      CAPILLARY BLOOD GLUCOSE        I&O's Summary    03 May 2023 07:01  -  04 May 2023 07:00  --------------------------------------------------------  IN: 400 mL / OUT: 401 mL / NET: -1 mL        PHYSICAL EXAM:  Vital Signs Last 24 Hrs  T(C): 36.3 (04 May 2023 04:39), Max: 36.8 (03 May 2023 12:06)  T(F): 97.4 (04 May 2023 04:39), Max: 98.2 (03 May 2023 12:06)  HR: 60 (04 May 2023 04:39) (55 - 74)  BP: 113/63 (04 May 2023 04:39) (110/68 - 115/69)  BP(mean): --  RR: 18 (04 May 2023 04:39) (18 - 18)  SpO2: 97% (04 May 2023 04:39) (97% - 100%)    Parameters below as of 04 May 2023 04:39  Patient On (Oxygen Delivery Method): room air        HEAD:  Atraumatic, Normocephalic  EYES: EOMI, PERRLA, conjunctiva and sclera clear  MOUTH: no oral thrush  NECK: Supple, No JVD  CHEST/LUNG: Clear to auscultation bilaterally; No wheeze  HEART: Regular rate and rhythm; No murmurs, rubs, or gallops  ABDOMEN: Soft, Nontender, Nondistended; Bowel sounds present  EXTREMITIES:  2+ Peripheral Pulses, No clubbing, cyanosis, or edema  NEUROLOGY: AAOx1, RUE and LE 5/5 strength, no deficits on exam   SKIN: No rashes or lesions    LABS:                      RADIOLOGY & ADDITIONAL TESTS:  Results Reviewed:   Imaging Personally Reviewed:  Electrocardiogram Personally Reviewed:    COORDINATION OF CARE:  Care Discussed with Consultants/Other Providers [Y/N]:  Prior or Outpatient Records Reviewed [Y/N]:

## 2023-05-04 NOTE — PROGRESS NOTE ADULT - ASSESSMENT
76YO Male PMH active tobacco and EtOH use disorder presented with R sided weakness/numbness x 2 days. Found to have CT head showing Left parietal mass w/ vasogenic edema, no sig MLS. Subsequent CT chest showing emphysema, mediastinal and hilar adenopathy. Pulmonary consulted for evaluation of Mediastinal and hilar adenopathy.     #Concern for metastatic disease  #CT findings of Emphysema  - Pt with Parietal mass w/ vasogenic edema with mediastinal and hilar adenopathy  - Paratracheal (station 4R) and Hilar (station 10R) nodes are amenable to EBUS guided bronchoscopic biopsy  - No active signs of withdrawal on exam, no longer on benzodiazepenes  - Plan for thoracentesis with ebus and lymph node biopsy tomorrow. Discussed with patient and surrogate.  - please ensure NPO at midnight (pt reports he sleeps at 7pm and wakes up 3am and eats breakfast). Ingestion of food/liquids (meds okay) after midnight may result in cancellation of case  - please obtain coags (PT/PTT/INR) prior to procedure; would obtain this evening  - pulmonary will follow    Wilmer Martinez MD  PGY-6  PCCM Fellow  Pager [phone]   78YO Male PMH active tobacco and EtOH use disorder presented with R sided weakness/numbness x 2 days. Found to have CT head showing Left parietal mass w/ vasogenic edema, no sig MLS. Subsequent CT chest showing emphysema, mediastinal and hilar adenopathy. Pulmonary consulted for evaluation of Mediastinal and hilar adenopathy.     #Concern for metastatic disease  #CT findings of Emphysema  - Pt with Parietal mass w/ vasogenic edema with mediastinal and hilar adenopathy  - Paratracheal (station 4R) and Hilar (station 10R) nodes are amenable to EBUS guided bronchoscopic biopsy  - No active signs of withdrawal on exam, no longer on benzodiazepenes  - Plan for bronchoscopy with ebus and lymph node biopsy tomorrow. Discussed with patient and surrogate.  - please ensure NPO at midnight (pt reports he sleeps at 7pm and wakes up 3am and eats breakfast). Ingestion of food/liquids (meds okay) after midnight may result in cancellation of case  - please obtain coags (PT/PTT/INR) prior to procedure; would obtain this evening  - pulmonary will follow    Wilmer Martinez MD  PGY-6  PCCM Fellow  Pager [phone]

## 2023-05-05 NOTE — CHART NOTE - NSCHARTNOTEFT_GEN_A_CORE
ONCOLOGY FELLOW NOTE     77M with EtOH use disorder, no known PMHx as hasn't seen doctors in decades, presents with R sided weakness/numbness x 2 day, found to have left parietal mass w/ vasogenic edema. Was placed on ativan taper for EtOH withdrawal however became very lethargic, was discontinued.    S/p biopsy of hilar lymphadenopathy by bronchoscopy. Will follow up pathology results.    Discussed with Dr. Tricia Swan.    Angelito Mccord MD  Hematology/Oncology Fellow PGY-4  Pager: [facility] [phone] / [phone] [phone]  After 5pm and on weekends please page on-call fellow

## 2023-05-05 NOTE — PROGRESS NOTE ADULT - PROBLEM SELECTOR PLAN 1
R sided weakness/numbness x 2 day, found to have Left parietal mass w/ vasogenic edema  - CTA neck with Left vertebral stenosis but good basilar filling.  - appreciate NSG recs: recommend MRI  MRI head with multiple masses, concerning for mets  CT chest/abd/pelv without signs of other malignancy, does have hilar lymphadenopathy   Ethics consulted for complicated GOC, spouse is the legal decision maker  - Pulm plans for biopsy of lymph node this week   - f/u palliative recs   - will need onc consult eventually   - continue decadron 4mg q12hr, taper  - continue keppra 500mg BID

## 2023-05-05 NOTE — PROGRESS NOTE ADULT - SUBJECTIVE AND OBJECTIVE BOX
PATIENT: MARY JARVIS, MRN: [id number]    CHIEF COMPLAINT: Patient is a 77y old  Male who presents with a chief complaint of R sided weakness/numbness (04 May 2023 09:54)      INTERVAL HISTORY/OVERNIGHT EVENTS: No overnight events.     MEDICATIONS:  MEDICATIONS  (STANDING):  budesonide  80 MICROgram(s)/formoterol 4.5 MICROgram(s) Inhaler 2 Puff(s) Inhalation two times a day  chlorhexidine 4% Liquid 1 Application(s) Topical daily  dexAMETHasone     Tablet 4 milliGRAM(s) Oral every 12 hours  folic acid 1 milliGRAM(s) Oral daily  levETIRAcetam 500 milliGRAM(s) Oral two times a day  multivitamin 1 Tablet(s) Oral daily  nicotine -   7 mG/24Hr(s) Patch 1 Patch Transdermal daily  tiotropium 2.5 MICROgram(s) Inhaler 2 Puff(s) Inhalation daily    MEDICATIONS  (PRN):  acetaminophen     Tablet .. 650 milliGRAM(s) Oral every 6 hours PRN Temp greater or equal to 38C (100.4F), Mild Pain (1 - 3)  aluminum hydroxide/magnesium hydroxide/simethicone Suspension 30 milliLiter(s) Oral every 4 hours PRN Dyspepsia  melatonin 3 milliGRAM(s) Oral at bedtime PRN Insomnia  ondansetron Injectable 4 milliGRAM(s) IV Push every 8 hours PRN Nausea and/or Vomiting      ALLERGIES: Allergies    No Known Allergies    Intolerances        OBJECTIVE:  ICU Vital Signs Last 24 Hrs  T(C): 36.9 (05 May 2023 06:52), Max: 36.9 (05 May 2023 04:10)  T(F): 98.4 (05 May 2023 06:52), Max: 98.4 (05 May 2023 04:10)  HR: 60 (05 May 2023 06:52) (58 - 66)  BP: 100/60 (05 May 2023 06:52) (100/60 - 117/65)  BP(mean): --  ABP: --  ABP(mean): --  RR: 18 (05 May 2023 06:52) (18 - 18)  SpO2: 96% (05 May 2023 06:52) (96% - 97%)    O2 Parameters below as of 05 May 2023 04:10  Patient On (Oxygen Delivery Method): room air            Adult Advanced Hemodynamics Last 24 Hrs  CVP(mm Hg): --  CVP(cm H2O): --  CO: --  CI: --  PA: --  PA(mean): --  PCWP: --  SVR: --  SVRI: --  PVR: --  PVRI: --  CAPILLARY BLOOD GLUCOSE        CAPILLARY BLOOD GLUCOSE        I&O's Summary    04 May 2023 07:01  -  05 May 2023 07:00  --------------------------------------------------------  IN: 600 mL / OUT: 1350 mL / NET: -750 mL      Daily     Daily     PHYSICAL EXAMINATION:  HEAD:  Atraumatic, Normocephalic  EYES: EOMI, PERRLA, conjunctiva and sclera clear  MOUTH: no oral thrush  NECK: Supple, No JVD  CHEST/LUNG: Clear to auscultation bilaterally; No wheeze  HEART: Regular rate and rhythm; No murmurs, rubs, or gallops  ABDOMEN: Soft, Nontender, Nondistended; Bowel sounds present  EXTREMITIES:  2+ Peripheral Pulses, No clubbing, cyanosis, or edema  NEUROLOGY: AAOx1, RUE and LE 5/5 strength, no deficits on exam   SKIN: No rashes or lesions  LABS:                          14.9   12.56 )-----------( 238      ( 04 May 2023 19:34 )             42.6     05-04    135  |  99  |  27<H>  ----------------------------<  137<H>  4.3   |  26  |  1.25    Ca    9.3      04 May 2023 19:34  Phos  3.2     05-04  Mg     2.0     05-04        PT/INR - ( 04 May 2023 19:34 )   PT: 11.5 sec;   INR: 1.00 ratio         PTT - ( 04 May 2023 19:34 )  PTT:22.8 sec

## 2023-05-05 NOTE — CONSULT NOTE ADULT - ATTENDING COMMENTS
78YO Male PMH active tobacco and EtOH use disorder presented with R sided weakness/numbness x 2 days. Found to have CT head showing Left parietal mass w/ vasogenic edema, no sig MLS. Subsequent CT chest showing emphysema, mediastinal and hilar adenopathy. Interventional Pulmonary consulted for evaluation of Mediastinal and hilar adenopathy and tissue biopsy.    Risks of the procedure including but not limited to pneumothorax and bleeding discussed, and possible interventions in case those are noted also discussed extensively. Alternative modalities of sampling as well as option to pursue surveillance imaging also discussed. After thorough risk benefit discussion and answering all questions related to the procedure, patient's NOK and patient agreeable to proceed with proposed intervention.

## 2023-05-05 NOTE — CONSULT NOTE ADULT - ASSESSMENT
76YO Male PMH active tobacco and EtOH use disorder presented with R sided weakness/numbness x 2 days. Found to have CT head showing Left parietal mass w/ vasogenic edema, no sig MLS. Subsequent CT chest showing emphysema, mediastinal and hilar adenopathy. Pulmonary consulted for evaluation of Mediastinal and hilar adenopathy.     #Concern for metastatic disease  #CT findings of Emphysema  - Pt with Parietal mass w/ vasogenic edema with mediastinal and hilar adenopathy  - Paratracheal (station 4R) and Hilar (station 10R) nodes are amenable to EBUS guided bronchoscopic biopsy  - Plan for bronchoscopy with ebus and lymph node biopsy today Discussed with patient and surrogate.

## 2023-05-05 NOTE — CHART NOTE - NSCHARTNOTEFT_GEN_A_CORE
S/p bronchoscopy with EBUS and lymph node biopsy.     No further pulmonary intervention planned during hospitalization at this time. Can follow up outpatient for post-procedure and results.    Please email: [email or web address] to setup an appointment prior to discharge. Include the patient's name, , MRN and contact information in the email.      Pulmonary/Sleep Clinic  [address] [place] [address]  [phone]    Wilmer Martinez MD  PGY-6  PCCM Fellow  Pager [phone]

## 2023-05-05 NOTE — PRE-ANESTHESIA EVALUATION ADULT - NSANTHAPLANRD_GEN_ALL_CORE
EXAM note      History    Jassi Marr Jr. is a 33 year old male who has history of Juany-Parkinson-White syndrome, hypertension, chest pain and shortness of breath who presents for follow up.  Since his last visit on 10/24/2016, the patient presented to the emergency room on 07/31/2018 with chest pain for 2 weeks.  Since then, the patient has not had any chest pain but has felt his heart racing once or twice.  He has sleep apnea and uses a CPAP machine.    The patient was seen in [facility] for atrial fibrilltion with a rapid ventricular response thought to be conducting with WPW and converted to sinus rhythm on his own.  He had a syncopal episode enroute to the emergency room.  He said for 3 days he was feeling weak and felt his heart was out of rhythm. On day 4, he felt worse.  He had chest pressure and arm pain with labored breathing. He called 911 and presented to the ER.      Stress test 08/12/2015:   1. Lexiscan Cardiolite stress test, which is negative for electrocardiographic evidence of ischemia.  2. Blood pressure remained stable.  3. No arrhythmias.  4. Recovery period was uneventful.  5. The Cardiolite portion of the study is pending at the time of  this dictation and dictated in a separate report.    Nuclear myocardial perfusion scan 08/12/2015:   1. No evidence of Regadenoson induced ischemia.  2. No evidence of previous myocardial injury pattern.  3. Diaphragmatic attenuation noted in the scan.  4. Left ventricular ejection fraction is 65%.    medical history    Past Medical History:   Diagnosis Date   • Concussion    • Coronary artery disease    • Depression    • Diabetes mellitus (CMS/HCC)    • Epilepsy (CMS/HCC)    • Headache(784.0)    • Hypertension    • Metabolic syndrome    • CHRIS on CPAP 04/14/2015    Followed by Dr. Jo at [phone]   • Other abnormal blood chemistry    • Paroxysmal atrial fibrillation (CMS/HCC)    • PONV (postoperative nausea and vomiting)    • Seizure disorder  (CMS/[facility])     has both petit and grand mal seizures, last one approx Aug 2016   • Syncope and collapse    • Wears glasses    • Bentley-Parkinson-White syndrome        SURGICAL history    Past Surgical History:   Procedure Laterality Date   • Ep study  11/16/2016    NO INDUCIBLE ARRTHYMIA   • Hand surgery  approx 2006    cyst removed   • Vagus nerve stimulator insertion  July 2008   • Vagus nerve stimulator insertion  01/13/2015    vagal nervie stimulator generator replacement       mEDICATIONS    Current Outpatient Prescriptions   Medication Sig   • butalbital-acetaminophen-caffeine (FIORICET) -40 MG capsule Take 1 capsule by mouth every 4 hours as needed (For Headache.).   • diphenoxylate-atropine (LOMOTIL) 2.5-0.025 MG tablet Take 1 tablet by mouth 3 times daily as needed for Diarrhea.   • prochlorperazine (COMPAZINE) 10 MG tablet Take 1 tablet by mouth every 8 hours as needed for Nausea or Vomiting.   • atorvastatin (LIPITOR) 10 MG tablet TAKE 1 TABLET BY MOUTH DAILY   • methylPREDNISolone (MEDROL DOSEPAK) 4 MG tablet follow package directions   • testosterone 5 mg/g (0.5 %) cream (in non alcohol base) Apply 1 gram (pump) to upper arms or shoulders once daily rotating sites of application.   • Cholecalciferol (VITAMIN D) 2000 units tablet Take 1-2 tablets by mouth daily.   • atenolol (TENORMIN) 100 MG tablet TAKE 1 TABLET BY MOUTH DAILY   • ibuprofen (MOTRIN) 800 MG tablet Take 1 tablet by mouth every 6 hours as needed for Pain.   • metformin (GLUCOPHAGE) 1000 MG tablet TAKE 1 TABLET BY MOUTH EVERY MORNING THEN TAKE 1/2 TABLET BY MOUTH EVERY EVENING   • zonisamide (ZONEGRAN) 100 MG capsule TAKE 3 CAPSULES BY MOUTH TWICE DAILY   • divalproex (DEPAKOTE ER) 500 MG 24 hr ER tablet TAKE 3 TABLETS BY MOUTH IN THE AM AND 2 TABLETS IN THE PM   • sertraline (ZOLOFT) 100 MG tablet TAKE 1 AND 1/2 TABLETS BY MOUTH DAILY   • sildenafil (REVATIO) 20 MG tablet Take 2-5 tablets as needed for erectile dysfunction.   •  flecainide (TAMBOCOR) 100 MG tablet TAKE 1 TABLET BY MOUTH TWICE DAILY   • Blood Glucose Monitoring Suppl (BRIGIDO CONTOUR MONITOR) Device 1 each 2 times daily. Test blood Sugars Twice Daily  Dx Code  E11.9   • DISPENSE Please dispense lancets for brigido contour next monitor.  DX:R73.09.  Patient to check blood sugar 2 times daily.   • DISPENSE Please dispense glucose test strips for brigido contour next glucose monitor.  DX:R73.09  Patient to check blood sugar 2 times daily.   • BRIGIDO CONTOUR NEXT TEST strip TEST TWICE DAILY OR AS DIRECTED   • Multiple Vitamin (MULTI VITAMIN MENS PO) Take 1 tablet by mouth.     No current facility-administered medications for this visit.        aLLERGIES    ALLERGIES:   Allergen Reactions   • Digoxin SEIZURES   • Nitroglycerin Cr [Nitroglycerin] SEIZURES   • Ultram [Tramadol Hcl] SEIZURES   • Calamari Oil HIVES   • Mushroom HIVES   • Seafood HIVES       Physical Exam    Vital Signs:    Vitals:    08/23/18 1530   BP: 132/82   Pulse: 74   Resp: 20   Weight: (!) 146.5 kg   Height: 6' 5\" (1.956 m)   General: NAD, pleasant, alert and oriented x 3.  Neck: No carotid bruits, no JVD (jugular venous distension). Normal carotid upstroke.  Pulmonary: No retractions or increased work of breathing, clear to auscultation bilaterally. No crackles or wheezing.  Cardiovascular: PMI (point of maximal impulse) in 5th intercostal place. RRR, normal S1 S2, no S3 or S4 appreciated. There are no murmurs.  Abdomen:  Bowel sounds normoactive, soft, non-tender, non-distended, no hepatosplenomegaly.  Extremities:  No edema or cyanosis,     Glucose (mg/dL)   Date Value   08/16/2018 169 (H)      CHOLESTEROL (mg/dL)   Date Value   06/22/2018 133     HDL (mg/dL)   Date Value   06/22/2018 39 (L)     CALCULATED LDL (mg/dL)   Date Value   06/22/2018 70     TRIGLYCERIDE (mg/dL)   Date Value   06/22/2018 119      Lab Results   Component Value Date    AST 14 08/16/2018       Assessment  1.  Hypertension, controlled.   2.   Juany-Parkinson-White syndrome.  3.  Atypical chest pain.   4.  Paroxysmal atrial fibrillation, converted to normal sinus rhythm.  5.  Episode of syncope.  6.  Palpitations.    PLAN  Jassi Marr Jr. is a pleasant 33 year old white gentleman with history of Juany-Parkinson-White syndrome who continues to have chest pain.  Will order a stress test to be done next week  He was advised to hold the Atenolol for 24 hours before the procedure. I have asked him to get Alivecor to try and diagnose his fast heart rate.  He will continue the current medications.  He will follow up with me in 9 months. Thank you for allowing me to participate in the care of this patient.     On 8/23/2018, IJackeline scribed the services personally performed by Jose Alfredo Polladr MD    The documentation recorded by the scribe accurately and completely reflects the service(s) I personally performed and the decisions made by me.            Sincerely,      Jose Alfredo Pollard M.D.-Ph.D. [facility]         general

## 2023-05-05 NOTE — PRE PROCEDURE NOTE - PRE PROCEDURE EVALUATION
Attending Physician:  Dr Silva                           Procedure: Bronchoscopy with EBUS     Indication for Procedure: Parietal mass w/ vasogenic edema with mediastinal and hilar adenopathy  ________________________________________________________  PAST MEDICAL & SURGICAL HISTORY:  Alcohol abuse, daily use      No significant past surgical history        ALLERGIES:  No Known Allergies    HOME MEDICATIONS:  Centrum oral tablet: 1 tab(s) orally once a day    AICD/PPM: [ ] yes   [ ] no    PERTINENT LAB DATA:                        14.9   12.56 )-----------( 238      ( 04 May 2023 19:34 )             42.6     05-04    135  |  99  |  27<H>  ----------------------------<  137<H>  4.3   |  26  |  1.25    Ca    9.3      04 May 2023 19:34  Phos  3.2     05-04  Mg     2.0     05-04      PT/INR - ( 04 May 2023 19:34 )   PT: 11.5 sec;   INR: 1.00 ratio         PTT - ( 04 May 2023 19:34 )  PTT:22.8 sec            PHYSICAL EXAMINATION:    T(C): 36.7  HR: 61  BP: 112/71  RR: 18  SpO2: 91%    General: Male laying in bed  Respiratory: CTA bilaterally  Cardiovascular: Regular rate and rhythm   Abdomen: Nontender nondistended  Extremities: No peripheral edema  Neurological: A+Ox2, to self and situation       COMMENTS:    The patient is a suitable candidate for the planned procedure unless box checked [ ]  No, explain:

## 2023-05-05 NOTE — PROGRESS NOTE ADULT - ATTENDING COMMENTS
This is a 77M with EtOH use disorder, hasn't seen doctors in decades, presents with R sided weakness/numbness x 2 day and AMS. MAR brain shows multiple enhancing masses within the brain, the largest ring enhancing mass measuring 2.9 x 2.7 x 3.0 cm in the LEFT parietal lobe with moderate surrounding edema which demonstrates mass effect on the posterior horn of the LEFT lateral ventricle. A 1.4 cm lesion is medially with mild surrounding edema and a 9 mm ring-enhancing lesion is seen in the RIGHT occipital cortex with minimal edema. 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    1. 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 Mediastinal and hilar adenopathy  3. Alcohol use    - Feels ok, no HA, AAOx 2, VSS, confused at time, noted RUE mild weakness   - NPO for Bronch and EBUS for mediastinal and hilar LN biopsy today by Pulmonary  - Palliative spoke to wife ( the surrogate per Ethics), gave consent for biopsy  - Will call Oncology pending biopsy report  - Reviewed MRI brain and CT c/a/p personally- spoke to NeuroSx- no plans for brain biopsy  - decreased Decadron 4mg q 12hrs, AED for seizure ppx  - Appreciated Ethic eval- per rec his domestic partner, Zoe Jones- ([phone]), over 30+ years of partnership with one biological child, will be the legal surrogate who can be contacted for interventions going forward. His sister, Michelle [phone] has been seeing his also.   - appreciated pulmonary plans- awaiting to hear from family for final decision  - Neurocheck q 4 hrs .  - seizure precaution  ** Team spoke to family

## 2023-05-05 NOTE — CONSULT NOTE ADULT - SUBJECTIVE AND OBJECTIVE BOX
HPI:  76YO Male active smoker PMH EtOH use disorder and no other known PMH as hasn't seen doctors in decades, who was admitted 4/24 w/ R sided weakness/numbness x 2 days.     He states that he woke up woke up 2 days ago and did not have sensation in his right arm or leg, states he fell over.  Did not want to seek medical attention at that time. He states that right leg function has gradually improved but right arm function is still weak. Since the symptoms didn't get better, he came to ED for further evaluatios. Denies head trauma, LOC, fever/chills, syncope.    He states that he has been drinking a bottle of rum everyday. Denies hx of withdrawal seizures, hallucinations or DTs. No hospitalizations for EtOH withdrawals as per patient. He lives with his sister and is not interested in seeing doctor as outpatient.    In ED, he was afebrile, hemodynamically stable, saturating well on room air. Labs without leukocytosis, no platelet or LFT abnormalities and normal coags. CT head was performed showing Left parietal mass w/ vasogenic edema, no sig MLS. As there was concern for metastasis CT Chest/Abd/Pelvis was performed showing emphysema, mediastinal and hilar adenopathy as well as lumbar and cervical osseous changes which may represent metastatic disease vs degenerative changes. Distal tracheal secretions and LLL nodular opacities were also noted.    Pulmonary consulted for evaluation of Mediastinal and hilar adenopathy.     Since admission pt has been on standing Ativan for prevention of ETOH withdrawal and does have significant ETOH history. Pt has otherwise been stable.     PAST MEDICAL & SURGICAL HISTORY:  Alcohol abuse, daily use  No significant past surgical history    FAMILY HISTORY:  No pertinent family history in first degree relatives        SOCIAL HISTORY:  Smoking: [ ] Never Smoked [ ] Former Smoker (__ packs x ___ years) [x ] Current Smoker  (__ packs x ___ years)  Substance Use: [ ] Never Used [ ] Used ____  EtOH Use:  Marital Status: [ ] Single [ ]  [ ]  [ ]   Sexual History:   Occupation:  Recent Travel:  Country of Birth:  Advance Directives:    Allergies    No Known Allergies    Intolerances        HOME MEDICATIONS:  Home Medications:  Centrum oral tablet: 1 tab(s) orally once a day (24 Apr 2023 12:29)      REVIEW OF SYSTEMS:  [x ] Unable to assess ROS because lethargic post ativan    OBJECTIVE:  ICU Vital Signs Last 24 Hrs  T(C): 36.7 (25 Apr 2023 11:45), Max: 37.1 (24 Apr 2023 16:33)  T(F): 98 (25 Apr 2023 11:45), Max: 98.7 (24 Apr 2023 16:33)  HR: 77 (25 Apr 2023 11:45) (60 - 80)  BP: 118/62 (25 Apr 2023 11:45) (102/58 - 136/69)  BP(mean): --  ABP: --  ABP(mean): --  RR: 18 (25 Apr 2023 11:45) (18 - 20)  SpO2: 97% (25 Apr 2023 11:45) (96% - 100%)    O2 Parameters below as of 25 Apr 2023 11:45  Patient On (Oxygen Delivery Method): room air              04-25 @ 07:01  -  04-25 @ 13:26  --------------------------------------------------------  IN: 150 mL / OUT: 0 mL / NET: 150 mL      CAPILLARY BLOOD GLUCOSE      POCT Blood Glucose.: 132 mg/dL (23 Apr 2023 19:39)      PHYSICAL EXAM:  General: Male laying in bed, somnolent but arousable  Respiratory: CTA bilaterally  Cardiovascular: Regular rate and rhythm no m/r/g  Abdomen: Nontender nondistended  Extremities: No peripheral edema  Neurological: Somnolent but arrousable    LINES:     HOSPITAL MEDICATIONS:  Standing Meds:  dexAMETHasone     Tablet 4 milliGRAM(s) Oral every 6 hours  folic acid 1 milliGRAM(s) Oral daily  levETIRAcetam 500 milliGRAM(s) Oral two times a day  multivitamin 1 Tablet(s) Oral daily  nicotine -   7 mG/24Hr(s) Patch 1 Patch Transdermal daily  thiamine IVPB 500 milliGRAM(s) IV Intermittent daily      PRN Meds:  acetaminophen     Tablet .. 650 milliGRAM(s) Oral every 6 hours PRN  aluminum hydroxide/magnesium hydroxide/simethicone Suspension 30 milliLiter(s) Oral every 4 hours PRN  LORazepam     Tablet 1 milliGRAM(s) Oral every 6 hours PRN  melatonin 3 milliGRAM(s) Oral at bedtime PRN  ondansetron Injectable 4 milliGRAM(s) IV Push every 8 hours PRN      LABS:                        12.8   6.79  )-----------( 167      ( 24 Apr 2023 04:10 )             36.4     Hgb Trend: 12.8<--, 13.0<--  04-24    140  |  106  |  16  ----------------------------<  115<H>  3.9   |  24  |  1.20    Ca    8.6      24 Apr 2023 04:10    TPro  6.8  /  Alb  3.7  /  TBili  0.5  /  DBili  x   /  AST  26  /  ALT  13  /  AlkPhos  86  04-24    Creatinine Trend: 1.20<--, 1.30<--  PT/INR - ( 24 Apr 2023 04:10 )   PT: 13.1 sec;   INR: 1.13 ratio         PTT - ( 24 Apr 2023 04:10 )  PTT:30.7 sec          MICROBIOLOGY:       RADIOLOGY:  [ ] Reviewed and interpreted by me    PULMONARY FUNCTION TESTS:    EKG:

## 2023-05-05 NOTE — PRE-ANESTHESIA EVALUATION ADULT - NSATTENDATTESTRD_GEN_ALL_CORE
The patient has been re-examined and I agree with the above assessment or I updated with my findings. sent in by dr. dunlap for IV solumedrol treatment on admission to hospital, pt has history of MS.

## 2023-05-06 NOTE — PROGRESS NOTE ADULT - ATTENDING COMMENTS
This is a 77M with EtOH use disorder, hasn't seen doctors in decades, presents with R sided weakness/numbness x 2 day and AMS. MAR brain shows multiple enhancing masses within the brain, the largest ring enhancing mass measuring 2.9 x 2.7 x 3.0 cm in the LEFT parietal lobe with moderate surrounding edema which demonstrates mass effect on the posterior horn of the LEFT lateral ventricle. A 1.4 cm lesion is medially with mild surrounding edema and a 9 mm ring-enhancing lesion is seen in the RIGHT occipital cortex with minimal edema. 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    1. 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 Mediastinal and hilar adenopathy  3. Alcohol use    - No specific complaints, no HA, AAOx 2, VSS, confused at time, noted RUE mild weakness   - NPO for Bronch and EBUS for mediastinal and hilar LN biopsy today by Pulmonary  - Palliative spoke to wife ( the surrogate per Ethics), gave consent for biopsy  - Oncology reviewed the case and waiting on Biopsy result, may need Rad/Onc  - Reviewed MRI brain and CT c/a/p personally- spoke to NeuroSx- no plans for brain biopsy  - decreased Decadron 4mg q 12hrs, AED for seizure ppx  - Appreciated Ethic eval- per rec his domestic partner, Zoe Jones- ([phone]), over 30+ years of partnership with one biological child, will be the legal surrogate who can be contacted for interventions going forward. His sister, Michelle [phone] has been seeing his also.   - appreciated pulmonary plans- awaiting to hear from family for final decision  - Neuro check q 4 hrs .  - seizure precaution  ** Team spoke to family

## 2023-05-06 NOTE — PROGRESS NOTE ADULT - SUBJECTIVE AND OBJECTIVE BOX
PATIENT: MARY JARVIS, MRN: [id number]    CHIEF COMPLAINT: Patient is a 77y old  Male who presents with a chief complaint of R sided weakness/numbness (05 May 2023 07:23)      INTERVAL HISTORY/OVERNIGHT EVENTS: No overnight events.     MEDICATIONS:  MEDICATIONS  (STANDING):  budesonide  80 MICROgram(s)/formoterol 4.5 MICROgram(s) Inhaler 2 Puff(s) Inhalation two times a day  chlorhexidine 4% Liquid 1 Application(s) Topical daily  dexAMETHasone     Tablet 4 milliGRAM(s) Oral every 12 hours  folic acid 1 milliGRAM(s) Oral daily  levETIRAcetam 500 milliGRAM(s) Oral two times a day  multivitamin 1 Tablet(s) Oral daily  nicotine -   7 mG/24Hr(s) Patch 1 Patch Transdermal daily  tiotropium 2.5 MICROgram(s) Inhaler 2 Puff(s) Inhalation daily    MEDICATIONS  (PRN):  acetaminophen     Tablet .. 650 milliGRAM(s) Oral every 6 hours PRN Temp greater or equal to 38C (100.4F), Mild Pain (1 - 3)  aluminum hydroxide/magnesium hydroxide/simethicone Suspension 30 milliLiter(s) Oral every 4 hours PRN Dyspepsia  melatonin 3 milliGRAM(s) Oral at bedtime PRN Insomnia  ondansetron Injectable 4 milliGRAM(s) IV Push every 8 hours PRN Nausea and/or Vomiting      ALLERGIES: Allergies    No Known Allergies    Intolerances        OBJECTIVE:  ICU Vital Signs Last 24 Hrs  T(C): 36.6 (06 May 2023 05:04), Max: 36.9 (05 May 2023 20:20)  T(F): 97.8 (06 May 2023 05:04), Max: 98.5 (05 May 2023 20:20)  HR: 64 (06 May 2023 05:04) (55 - 77)  BP: 108/63 (06 May 2023 05:04) (95/55 - 121/62)  BP(mean): --  ABP: --  ABP(mean): --  RR: 18 (06 May 2023 05:04) (18 - 18)  SpO2: 98% (06 May 2023 05:04) (91% - 100%)    O2 Parameters below as of 06 May 2023 05:04  Patient On (Oxygen Delivery Method): room air            Adult Advanced Hemodynamics Last 24 Hrs  CVP(mm Hg): --  CVP(cm H2O): --  CO: --  CI: --  PA: --  PA(mean): --  PCWP: --  SVR: --  SVRI: --  PVR: --  PVRI: --  CAPILLARY BLOOD GLUCOSE        CAPILLARY BLOOD GLUCOSE        I&O's Summary    05 May 2023 07:01  -  06 May 2023 07:00  --------------------------------------------------------  IN: 360 mL / OUT: 900 mL / NET: -540 mL      Daily Height in cm: 170.18 (05 May 2023 08:00)    Daily     PHYSICAL EXAMINATION:  HEAD:  Atraumatic, Normocephalic  EYES: EOMI, PERRLA, conjunctiva and sclera clear  MOUTH: no oral thrush  NECK: Supple, No JVD  CHEST/LUNG: Clear to auscultation bilaterally; No wheeze  HEART: Regular rate and rhythm; No murmurs, rubs, or gallops  ABDOMEN: Soft, Nontender, Nondistended; Bowel sounds present  EXTREMITIES:  2+ Peripheral Pulses, No clubbing, cyanosis, or edema  NEUROLOGY: AAOx1, RUE and LE 5/5 strength, no deficits on exam   SKIN: No rashes or lesions    LABS:                          14.9   12.56 )-----------( 238      ( 04 May 2023 19:34 )             42.6     05-04    135  |  99  |  27<H>  ----------------------------<  137<H>  4.3   |  26  |  1.25    Ca    9.3      04 May 2023 19:34  Phos  3.2     05-04  Mg     2.0     05-04        PT/INR - ( 04 May 2023 19:34 )   PT: 11.5 sec;   INR: 1.00 ratio         PTT - ( 04 May 2023 19:34 )  PTT:22.8 sec

## 2023-05-07 NOTE — PROGRESS NOTE ADULT - PROBLEM SELECTOR PLAN 1
R sided weakness/numbness x 2 day, found to have Left parietal mass w/ vasogenic edema  - CTA neck with Left vertebral stenosis but good basilar filling.  - appreciate NSG recs: recommend MRI  MRI head with multiple masses, concerning for mets  CT chest/abd/pelv without signs of other malignancy, does have hilar lymphadenopathy   Ethics consulted for complicated GOC, spouse is the legal decision maker  Pulm plans for biopsy of lymph node s/p EBUS 5/5   Onc consulted, pending results   - continue decadron 4mg q12hr, taper  - continue keppra 500mg BID

## 2023-05-07 NOTE — PROGRESS NOTE ADULT - ASSESSMENT
77M with EtOH use disorder, no known PMHx as hasn't seen doctors in decades, presents with R sided weakness/numbness x 2 day, found to have Left parietal mass w/ vasogenic edema. Was placed on ativan taper for EtOH withdrawal however became very lethargic, was discontinued. Pending biopsy of lymph node, family/surrogate agreed to pursue biopsy s/p EBUS 5/5.

## 2023-05-07 NOTE — PROGRESS NOTE ADULT - SUBJECTIVE AND OBJECTIVE BOX
PATIENT: MARY JARVIS, MRN: [id number]    CHIEF COMPLAINT: Patient is a 77y old  Male who presents with a chief complaint of R sided weakness/numbness (06 May 2023 07:09)      INTERVAL HISTORY/OVERNIGHT EVENTS: No overnight events. No acute complaints at bedside, pleasant and denying chest pain, SOB, headaches.       MEDICATIONS:  MEDICATIONS  (STANDING):  budesonide  80 MICROgram(s)/formoterol 4.5 MICROgram(s) Inhaler 2 Puff(s) Inhalation two times a day  chlorhexidine 4% Liquid 1 Application(s) Topical daily  dexAMETHasone     Tablet 4 milliGRAM(s) Oral every 12 hours  folic acid 1 milliGRAM(s) Oral daily  levETIRAcetam 500 milliGRAM(s) Oral two times a day  multivitamin 1 Tablet(s) Oral daily  nicotine -   7 mG/24Hr(s) Patch 1 Patch Transdermal daily  tiotropium 2.5 MICROgram(s) Inhaler 2 Puff(s) Inhalation daily    MEDICATIONS  (PRN):  acetaminophen     Tablet .. 650 milliGRAM(s) Oral every 6 hours PRN Temp greater or equal to 38C (100.4F), Mild Pain (1 - 3)  aluminum hydroxide/magnesium hydroxide/simethicone Suspension 30 milliLiter(s) Oral every 4 hours PRN Dyspepsia  melatonin 3 milliGRAM(s) Oral at bedtime PRN Insomnia  ondansetron Injectable 4 milliGRAM(s) IV Push every 8 hours PRN Nausea and/or Vomiting      ALLERGIES: Allergies    No Known Allergies    Intolerances        OBJECTIVE:  ICU Vital Signs Last 24 Hrs  T(C): 36.5 (07 May 2023 04:22), Max: 36.7 (06 May 2023 20:03)  T(F): 97.7 (07 May 2023 04:22), Max: 98.1 (06 May 2023 20:03)  HR: 63 (07 May 2023 04:22) (62 - 88)  BP: 125/60 (07 May 2023 04:22) (98/60 - 125/60)  BP(mean): --  ABP: --  ABP(mean): --  RR: 18 (07 May 2023 04:22) (17 - 18)  SpO2: 98% (07 May 2023 04:22) (96% - 98%)    O2 Parameters below as of 07 May 2023 04:22  Patient On (Oxygen Delivery Method): room air            Adult Advanced Hemodynamics Last 24 Hrs  CVP(mm Hg): --  CVP(cm H2O): --  CO: --  CI: --  PA: --  PA(mean): --  PCWP: --  SVR: --  SVRI: --  PVR: --  PVRI: --  CAPILLARY BLOOD GLUCOSE        CAPILLARY BLOOD GLUCOSE        I&O's Summary    06 May 2023 07:01  -  07 May 2023 07:00  --------------------------------------------------------  IN: 680 mL / OUT: 0 mL / NET: 680 mL    07 May 2023 07:01  -  07 May 2023 07:39  --------------------------------------------------------  IN: 0 mL / OUT: 620 mL / NET: -620 mL      Daily     Daily     PHYSICAL EXAMINATION:  HEAD:  Atraumatic, Normocephalic  EYES: EOMI, PERRLA, conjunctiva and sclera clear  MOUTH: no oral thrush  NECK: Supple, No JVD  CHEST/LUNG: Clear to auscultation bilaterally; No wheeze  HEART: Regular rate and rhythm; No murmurs, rubs, or gallops  ABDOMEN: Soft, Nontender, Nondistended; Bowel sounds present  EXTREMITIES:  2+ Peripheral Pulses, No clubbing, cyanosis, or edema  NEUROLOGY: AAOx1, RUE and LE 5/5 strength, no deficits on exam   SKIN: No rashes or lesions    LABS:

## 2023-05-07 NOTE — PROGRESS NOTE ADULT - ATTENDING COMMENTS
77M with EtOH use disorder presented with R sided weakness/numbness x 2 day and AMS. MRI brain shows multiple enhancing masses within the brain, the largest ring enhancing mass measuring 2.9 x 2.7 x 3.0 cm in the LEFT parietal lobe with moderate surrounding edema which demonstrates mass effect on the posterior horn of the LEFT lateral ventricle. A 1.4 cm lesion is medially with mild surrounding edema and a 9 mm ring-enhancing lesion is seen in the RIGHT occipital cortex with minimal edema. 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 Decadron started for seizure ppx and had bronchoscopy/EBUS 5/5 for lymph node biopsy (mediastinal and hilar adenopathy). Pending pathology results. Mental status Aox1    d/w HS    Luly Nino MD  Division of Hospital Medicine  Available on Microsoft Teams

## 2023-05-08 NOTE — PROGRESS NOTE ADULT - PROBLEM SELECTOR PLAN 1
R sided weakness/numbness x 2 day, found to have Left parietal mass w/ vasogenic edema  - CTA neck with Left vertebral stenosis but good basilar filling.  - appreciate NSG recs: recommend MRI  MRI head with multiple masses, concerning for mets  CT chest/abd/pelv without signs of other malignancy, does have hilar lymphadenopathy   Ethics consulted for complicated GOC, spouse is the legal decision maker  Pulm plans for biopsy of lymph node s/p EBUS 5/5   Onc consulted, pending results   - continue decadron 4mg q12hr, taper  - continue keppra 500mg BID R sided weakness/numbness x 2 day, found to have Left parietal mass w/ vasogenic edema  - CTA neck with Left vertebral stenosis but good basilar filling.  - appreciate NSG recs: recommend MRI  MRI head with multiple masses, concerning for mets  CT chest/abd/pelv without signs of other malignancy, does have hilar lymphadenopathy   Ethics consulted for complicated GOC, spouse is the legal decision maker  Biopsy of lymph node EBUS 5/5   Onc consulted, pending results   - f/u biopsy results   - continue decadron 4mg q12hr, taper  - c/w PPI   - continue keppra 500mg BID R sided weakness/numbness x 2 day, found to have Left parietal mass w/ vasogenic edema  - CTA neck with Left vertebral stenosis but good basilar filling.  - appreciate NSG recs: recommend MRI  MRI head with multiple masses, concerning for mets  CT chest/abd/pelv without signs of other malignancy, does have hilar lymphadenopathy   Ethics consulted for complicated GOC, spouse is the legal decision maker  Biopsy of lymph node EBUS 5/5   Onc consulted, pending results   - f/u biopsy results   - continue decadron 4mg q12hr, taper.  - c/w PPI   - continue keppra 500mg BID

## 2023-05-08 NOTE — PROGRESS NOTE ADULT - SUBJECTIVE AND OBJECTIVE BOX
Date of Service: 05-08-23 @ 20:18    SUBJECTIVE AND OBJECTIVE:  Indication for Geriatrics and Palliative Care Services/INTERVAL HPI:    OVERNIGHT EVENTS:    DNR on chart:  Allergies    No Known Allergies    Intolerances    MEDICATIONS  (STANDING):  budesonide  80 MICROgram(s)/formoterol 4.5 MICROgram(s) Inhaler 2 Puff(s) Inhalation two times a day  chlorhexidine 4% Liquid 1 Application(s) Topical daily  dexAMETHasone     Tablet 4 milliGRAM(s) Oral every 12 hours  folic acid 1 milliGRAM(s) Oral daily  levETIRAcetam 500 milliGRAM(s) Oral two times a day  multivitamin 1 Tablet(s) Oral daily  nicotine -   7 mG/24Hr(s) Patch 1 Patch Transdermal daily  pantoprazole    Tablet 40 milliGRAM(s) Oral before breakfast  tiotropium 2.5 MICROgram(s) Inhaler 2 Puff(s) Inhalation daily    MEDICATIONS  (PRN):  acetaminophen     Tablet .. 650 milliGRAM(s) Oral every 6 hours PRN Temp greater or equal to 38C (100.4F), Mild Pain (1 - 3)  aluminum hydroxide/magnesium hydroxide/simethicone Suspension 30 milliLiter(s) Oral every 4 hours PRN Dyspepsia  melatonin 3 milliGRAM(s) Oral at bedtime PRN Insomnia  ondansetron Injectable 4 milliGRAM(s) IV Push every 8 hours PRN Nausea and/or Vomiting      ITEMS UNCHECKED ARE NOT PRESENT    PRESENT SYMPTOMS: [ ]Unable to self-report - see [ ] CPOT [ ] PAINADS [ ] RDOS  Source if other than patient:  [ ]Family   [ ]Team     Pain:  [ ]yes [ ]no  QOL impact -   Location -                    Aggravating factors -  Quality -  Radiation -  Timing-  Severity (0-10 scale):  Minimal acceptable level (0-10 scale):     CPOT:    https://www.sccm.org/getattachment/wuu69i26-6u4z-[email or web address]-7f3r-0056z4692g5k/Critical-Care-Pain-Observation-Tool-(CPOT)    PAINAD Score: See PAINAD tool and score below       Dyspnea:                           [ ]Mild [ ]Moderate [ ]Severe    RDOS: See RDOS tool and score below   0 to 2  minimal or no respiratory distress   3  mild distress  4 to 6 moderate distress  >7 severe distress      Anxiety:                             [ ]Mild [ ]Moderate [ ]Severe  Fatigue:                             [ ]Mild [ ]Moderate [ ]Severe  Nausea:                             [ ]Mild [ ]Moderate [ ]Severe  Loss of appetite:              [ ]Mild [ ]Moderate [ ]Severe  Constipation:                    [ ]Mild [ ]Moderate [ ]Severe    PCSSQ[Palliative Care Spiritual Screening Question]   Severity (0-10):  Score of 4 or > indicate consideration of Chaplaincy referral.  Chaplaincy Referral: [ ] yes [ ] refused [ ] following [ ] Deferred     Caregiver [place]? : [ ] yes [ ] no [ ] Deferred [ ] Declined             Social work referral [ ] Patient & Family Centered Care Referral [ ]     Anticipatory Grief present?:  [ ] yes [ ] no  [ ] Deferred                  Social work referral [ ] Chaplaincy Referral [ ]    		  Other Symptoms:  [ ]All other review of systems negative     Palliative Performance Status Version 2:   See PPSv2 tool and score below         PHYSICAL EXAM:  Vital Signs Last 24 Hrs  T(C): 36.6 (08 May 2023 14:16), Max: 36.8 (08 May 2023 05:15)  T(F): 97.9 (08 May 2023 14:16), Max: 98.2 (08 May 2023 05:15)  HR: 68 (08 May 2023 14:16) (56 - 68)  BP: 117/60 (08 May 2023 14:16) (104/66 - 117/60)  BP(mean): --  RR: 18 (08 May 2023 14:16) (18 - 18)  SpO2: 99% (08 May 2023 14:16) (95% - 99%)    Parameters below as of 08 May 2023 14:16  Patient On (Oxygen Delivery Method): room air     I&O's Summary    07 May 2023 07:01  -  08 May 2023 07:00  --------------------------------------------------------  IN: 0 mL / OUT: 1470 mL / NET: -1470 mL    08 May 2023 07:01  -  08 May 2023 20:18  --------------------------------------------------------  IN: 300 mL / OUT: 975 mL / NET: -675 mL       GENERAL: [ ]Cachexia    [ ]Alert  [ ]Oriented x   [ ]Lethargic  [ ]Unarousable  [ ]Verbal  [ ]Non-Verbal  Behavioral:   [ ]Anxiety  [ ]Delirium [ ]Agitation [ ]Other  HEENT:  [ ]Normal   [ ]Dry mouth   [ ]ET Tube/Trach  [ ]Oral lesions  PULMONARY:   [ ]Clear [ ]Tachypnea  [ ]Audible excessive secretions   [ ]Rhonchi        [ ]Right [ ]Left [ ]Bilateral  [ ]Crackles        [ ]Right [ ]Left [ ]Bilateral  [ ]Wheezing     [ ]Right [ ]Left [ ]Bilateral  [ ]Diminished BS [ ] Right [ ]Left [ ]Bilateral  CARDIOVASCULAR:    [ ]Regular [ ]Irregular [ ]Tachy  [ ]Ab [ ]Murmur [ ]Other  GASTROINTESTINAL:  [ ]Soft  [ ]Distended   [ ]+BS  [ ]Non tender [ ]Tender  [ ]Other [ ]PEG [ ]OGT/ NGT   Last BM:   GENITOURINARY:  [ ]Normal [ ]Incontinent   [ ]Oliguria/Anuria   [ ]Cole  MUSCULOSKELETAL:   [ ]Normal   [ ]Weakness  [ ]Bed/Wheelchair bound [ ]Edema  NEUROLOGIC:   [ ]No focal deficits  [ ] Cognitive impairment  [ ] Dysphagia [ ]Dysarthria [ ] Paresis [ ]Other   SKIN:   [ ]Normal  [ ]Rash  [ ]Other  [ ]Pressure ulcer(s) [ ]y [ ]n present on admission    CRITICAL CARE:  [ ]Shock Present  [ ]Septic [ ]Cardiogenic [ ]Neurologic [ ]Hypovolemic  [ ]Vasopressors [ ]Inotropes  [ ]Respiratory failure present [ ]Mechanical Ventilation [ ]Non-invasive ventilatory support [ ]High-Flow   [ ]Acute  [ ]Chronic [ ]Hypoxic  [ ]Hypercarbic [ ]Other  [ ]Other organ failure     LABS:            RADIOLOGY & ADDITIONAL STUDIES:    Protein Calorie Malnutrition Present: [ ]mild [ ]moderate [ ]severe [ ]underweight [ ]morbid obesity  https://www.andeal.org/vault/5360/web/files/ONC/Table_Clinical%20Characteristics%20to%20Document%20Malnutrition-White%20JV%20et%20al%202012.pdf    Height (cm): 170.2 (05-05-23 @ 08:00), 170.2 (04-23-23 @ 19:34)  Weight (kg): 55 (05-05-23 @ 08:00), 68 (04-23-23 @ 19:34)  BMI (kg/m2): 19 (05-05-23 @ 08:00), 23.5 (04-23-23 @ 19:34)    [ ]PPSV2 < or = 30%  [ ]significant weight loss [ ]poor nutritional intake [ ]anasarca[ ]Artificial Nutrition    Other REFERRALS:  [ ]Hospice  [ ]Child Life  [ ]Social Work  [ ]Case management [ ]Holistic Therapy     Goals of Care Document: Date of Service: 05-08-23 @ 20:18    SUBJECTIVE AND OBJECTIVE: The patient wase seen and examined. He denied pain or dyspnea. He is s/p bronchoscopy and LN Bx on 5/5.   Indication for Geriatrics and Palliative Care Services/INTERVAL HPI: goals of care and advanced care planning.     OVERNIGHT EVENTS: No new events.     DNR on chart:  Allergies    No Known Allergies    Intolerances    MEDICATIONS  (STANDING):  budesonide  80 MICROgram(s)/formoterol 4.5 MICROgram(s) Inhaler 2 Puff(s) Inhalation two times a day  chlorhexidine 4% Liquid 1 Application(s) Topical daily  dexAMETHasone     Tablet 4 milliGRAM(s) Oral every 12 hours  folic acid 1 milliGRAM(s) Oral daily  levETIRAcetam 500 milliGRAM(s) Oral two times a day  multivitamin 1 Tablet(s) Oral daily  nicotine -   7 mG/24Hr(s) Patch 1 Patch Transdermal daily  pantoprazole    Tablet 40 milliGRAM(s) Oral before breakfast  tiotropium 2.5 MICROgram(s) Inhaler 2 Puff(s) Inhalation daily    MEDICATIONS  (PRN):  acetaminophen     Tablet .. 650 milliGRAM(s) Oral every 6 hours PRN Temp greater or equal to 38C (100.4F), Mild Pain (1 - 3)  aluminum hydroxide/magnesium hydroxide/simethicone Suspension 30 milliLiter(s) Oral every 4 hours PRN Dyspepsia  melatonin 3 milliGRAM(s) Oral at bedtime PRN Insomnia  ondansetron Injectable 4 milliGRAM(s) IV Push every 8 hours PRN Nausea and/or Vomiting      ITEMS UNCHECKED ARE NOT PRESENT    PRESENT SYMPTOMS: [ ]Unable to self-report - see [ ] CPOT [ ] PAINADS [ ] RDOS  Source if other than patient:  [ ]Family   [ ]Team     Pain:  [ ]yes [x ]no  QOL impact -   Location -                    Aggravating factors -  Quality -  Radiation -  Timing-  Severity (0-10 scale):  Minimal acceptable level (0-10 scale):     CPOT:    https://www.sccm.org/getattachment/fek09j84-5o4b-3g8z-4e1c-0944y9182v3t/Critical-Care-Pain-Observation-Tool-(CPOT)    PAINAD Score: See PAINAD tool and score below       Dyspnea:                           [ ]Mild [ ]Moderate [ ]Severe    RDOS: See RDOS tool and score below   0 to 2  minimal or no respiratory distress   3  mild distress  4 to 6 moderate distress  >7 severe distress      Anxiety:                             [ ]Mild [ ]Moderate [ ]Severe  Fatigue:                             [ ]Mild [ ]Moderate [ ]Severe  Nausea:                             [ ]Mild [ ]Moderate [ ]Severe  Loss of appetite:              [ ]Mild [ ]Moderate [ ]Severe  Constipation:                    [ ]Mild [ ]Moderate [ ]Severe    PCSSQ[Palliative Care Spiritual Screening Question]   Severity (0-10):  Score of 4 or > indicate consideration of Chaplaincy referral.  Chaplaincy Referral: [ ] yes [ ] refused [ ] following [ ] Deferred   Seen by chaplaincy on 4/27  Caregiver [place]? : [ ] yes [ ] no [x ] Deferred [ ] Declined             Social work referral [ ] Patient & Family Centered Care Referral [ ]     Anticipatory Grief present?:  [ ] yes [x ] no  [ ] Deferred                  Social work referral [ ] Chaplaincy Referral [ ]    		  Other Symptoms:  [x ]All other review of systems negative     Palliative Performance Status Version 2:   See PPSv2 tool and score below         PHYSICAL EXAM:  Vital Signs Last 24 Hrs  T(C): 36.6 (08 May 2023 14:16), Max: 36.8 (08 May 2023 05:15)  T(F): 97.9 (08 May 2023 14:16), Max: 98.2 (08 May 2023 05:15)  HR: 68 (08 May 2023 14:16) (56 - 68)  BP: 117/60 (08 May 2023 14:16) (104/66 - 117/60)  BP(mean): --  RR: 18 (08 May 2023 14:16) (18 - 18)  SpO2: 99% (08 May 2023 14:16) (95% - 99%)    Parameters below as of 08 May 2023 14:16  Patient On (Oxygen Delivery Method): room air     I&O's Summary    07 May 2023 07:01  -  08 May 2023 07:00  --------------------------------------------------------  IN: 0 mL / OUT: 1470 mL / NET: -1470 mL    08 May 2023 07:01  -  08 May 2023 20:18  --------------------------------------------------------  IN: 300 mL / OUT: 975 mL / NET: -675 mL       GENERAL: [ ]Cachexia    [x ]Alert  [x ]Oriented x 2-3   [ ]Lethargic  [ ]Unarousable  [x ]Verbal  [ ]Non-Verbal  Behavioral:   [ ]Anxiety  [ ]Delirium [ ]Agitation [ ]Other  HEENT:  [ ]Normal   [ ]Dry mouth   [ ]ET Tube/Trach  [ ]Oral lesions [x] Temporal waisting   PULMONARY:   [x ]Clear [ ]Tachypnea  [ ]Audible excessive secretions   [ ]Rhonchi        [ ]Right [ ]Left [ ]Bilateral  [ ]Crackles        [ ]Right [ ]Left [ ]Bilateral  [ ]Wheezing     [ ]Right [ ]Left [ ]Bilateral  [ ]Diminished BS [ ] Right [ ]Left [ ]Bilateral  CARDIOVASCULAR:    [ x]Regular [ ]Irregular [ ]Tachy  [ ]Ab [ ]Murmur [ ]Other  GASTROINTESTINAL:  [x ]Soft  [ ]Distended   [x ]+BS  [ x]Non tender [ ]Tender  [ ]Other [ ]PEG [ ]OGT/ NGT   Last BM: 5/3  GENITOURINARY:  [x ]Normal [ ]Incontinent   [ ]Oliguria/Anuria   [ ]Cole  MUSCULOSKELETAL:   [ ]Normal   [x ]Weakness  [ ]Bed/Wheelchair bound [ ]Edema  NEUROLOGIC:   [x ]No focal deficits  [ ] Cognitive impairment  [ ] Dysphagia [ ]Dysarthria [ ] Paresis [ ]Other   SKIN:   [x ]Normal  [ ]Rash  [ ]Other  [ ]Pressure ulcer(s) [ ]y [ ]n present on admission    CRITICAL CARE:  [ ]Shock Present  [ ]Septic [ ]Cardiogenic [ ]Neurologic [ ]Hypovolemic  [ ]Vasopressors [ ]Inotropes  [ ]Respiratory failure present [ ]Mechanical Ventilation [ ]Non-invasive ventilatory support [ ]High-Flow   [ ]Acute  [ ]Chronic [ ]Hypoxic  [ ]Hypercarbic [ ]Other  [ ]Other organ failure     LABS:  Reviewed.           RADIOLOGY & ADDITIONAL STUDIES:  < from: Bronchoscopy (05.05.23 @ 07:31) >  Findings:       The anna was noted to be sharpand normal. Notable, clear, thick secretions were found in        the left lower lobe and therapeutically aspirated An endobronchial ultrasound endoscope was        then utilized in order to assist with guiding the biopsy needle. Transbronchial biopsies were        performed in the lung and sent for routine cytology of sites 4R using EBUS and [employer]wolf Acquire 22G Needle. The procedure was guided by ultrasound. Prelim passes were        concerning for malignancy, multiple were submitted for cell block. A broncheoalveolar lavage        was then performed of the right upper lobe, posterior segment, with 50cc's instilled and        30cc's recovered. The patient tolerated the procedure well.  Impression:           - Chronic cough with abnormal CT                        - Hilar lymphadenopathy of the right side                        - Mediastinal adenopathy                        - Notable, clear, thick secretions were found in the left lower lobe,                         therapeutically aspirated                        - Endobronchial ultrasound was performed of level 4R.    < end of copied text >    Protein Calorie Malnutrition Present: [ ]mild [ ]moderate [ ]severe [ ]underweight [ ]morbid obesity  https://www.andeal.org/vault/2440/web/files/ONC/Table_Clinical%20Characteristics%20to%20Document%20Malnutrition-White%20JV%20et%20al%202012.pdf    Height (cm): 170.2 (05-05-23 @ 08:00), 170.2 (04-23-23 @ 19:34)  Weight (kg): 55 (05-05-23 @ 08:00), 68 (04-23-23 @ 19:34)  BMI (kg/m2): 19 (05-05-23 @ 08:00), 23.5 (04-23-23 @ 19:34)    [ ]PPSV2 < or = 30%  [ ]significant weight loss [ ]poor nutritional intake [ ]anasarca[ ]Artificial Nutrition    Other REFERRALS:  [ ]Hospice  [ ]Child Life  [ ]Social Work  [ ]Case management [ ]Holistic Therapy     Goals of Care Document:

## 2023-05-08 NOTE — PROGRESS NOTE ADULT - CONVERSATION DETAILS
The patient was alert and able to recognize that, recently, he had a lung Bx done. He thought the reason for the Bx was that Bart were trying to figure out what was going on with him. I indicated that beyond what is going on with his lungs that he was also having a problem with is brain. After I said that, he stopped me and said he has had a good life and that he knew that, at some point, he will die. Then, I asked him about any other problems with his body and he said he was feeling well. He then indicated he just wanted to go home and being at the [employer]. I tried to bring him back on a discussion about his illness but he wanted to focus on his life as a . He indicated, for several years, he has worked as a . 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 He stated the horse's name was Kiran. He let me know the Jockey was Matty Wu. With the patient by my side and with his permission I went on the web and confirmed that all this information is actually real.     When talking about where he was going to go after this hospitalization, [place] He let me know he had a place to stay inside and outside the race track. When I asked about anyone that was going to care/support him, he said that his sister was usually the one helping him out.     After this discussion with the patient, I called his sister. I let her know that the patient appeared to be more with it; however, that when talking to him he becomes tangential which makes it difficult to define his capacity; therefore, creating limitations for defining GOC and ACP. His sister confirmed the patient is a  and that he was working at [employer] for years. She also confirmed he can stay at a place in the [employer] but that he also has an apartment just across the street of it. I d/w her about her capacity as a caregiver and she indicated she was having severe limitations because she also has cancer and because she lives away from the patient's place. She was concerned that if the patient is close to the [employer] that he will always try to go there. I also indicated that, today, the patient was more alert and that I was starting to wonder if he may be able to make some (e.g., assigning a HCP and probably even addressing his code status) medical decisions and that I was considering recommending a psych eval for capacity. His sister recognize the patient is tangential and that it may continue to limit his capacity to participate in decision making; however, that she was for a psych eval. She also indicated that a few days ago, while in the hospital, the patient was actually going to assigns her as his HCP; however, for some reason, that did not happen, so she continues to abide by the law and accepting his partner as the surrogate decisions maker; however, as I have noticed in my prior notes, the patient's partner, relies upon the patient's sister for decision making. His sister let me know that she and the patient's family (partner and child) were waiting on LN Bx to further define GOC.

## 2023-05-08 NOTE — PROGRESS NOTE ADULT - PROBLEM SELECTOR PLAN 3
Will continue to f/u for ACP.   I will be on admin duties until 5/4. Meanwhile, If any acute issues require assistance from the [place] team, please call pager [phone], or if there is not an answer, the PCU at [phone].         Vaughn Flanagan MD  Associate Chief Geriatrics and Palliative Care (GaP) [facility] Director [place] Consult Service   , [employer] [facility]      Please contact me via Teams from Monday through Friday between 9am-5pm. If not answering, please call the palliative care pager [phone]    After 5pm and on weekends, please see the contact information below:    In the event of newly developing, evolving, or worsening symptoms, please contact the Palliative Medicine team via pager (if the patient is at [facility] #[phone] or if the patient is at [facility] #[phone]) The Geriatric and Palliative Medicine service has coverage 24 hours a day/ 7 days a week to provide medical recommendations regarding symptom management needs via telephone

## 2023-05-08 NOTE — PROGRESS NOTE ADULT - SUBJECTIVE AND OBJECTIVE BOX
PATIENT: MARY JARVIS, MRN: [id number]    CHIEF COMPLAINT: Patient is a 77y old  Male who presents with a chief complaint of R sided weakness/numbness (07 May 2023 07:38)      INTERVAL HISTORY/OVERNIGHT EVENTS: No overnight events.     MEDICATIONS:  MEDICATIONS  (STANDING):  budesonide  80 MICROgram(s)/formoterol 4.5 MICROgram(s) Inhaler 2 Puff(s) Inhalation two times a day  chlorhexidine 4% Liquid 1 Application(s) Topical daily  dexAMETHasone     Tablet 4 milliGRAM(s) Oral every 12 hours  folic acid 1 milliGRAM(s) Oral daily  levETIRAcetam 500 milliGRAM(s) Oral two times a day  multivitamin 1 Tablet(s) Oral daily  nicotine -   7 mG/24Hr(s) Patch 1 Patch Transdermal daily  tiotropium 2.5 MICROgram(s) Inhaler 2 Puff(s) Inhalation daily    MEDICATIONS  (PRN):  acetaminophen     Tablet .. 650 milliGRAM(s) Oral every 6 hours PRN Temp greater or equal to 38C (100.4F), Mild Pain (1 - 3)  aluminum hydroxide/magnesium hydroxide/simethicone Suspension 30 milliLiter(s) Oral every 4 hours PRN Dyspepsia  melatonin 3 milliGRAM(s) Oral at bedtime PRN Insomnia  ondansetron Injectable 4 milliGRAM(s) IV Push every 8 hours PRN Nausea and/or Vomiting      ALLERGIES: Allergies    No Known Allergies    Intolerances        OBJECTIVE:  ICU Vital Signs Last 24 Hrs  T(C): 36.8 (08 May 2023 05:15), Max: 36.8 (08 May 2023 05:15)  T(F): 98.2 (08 May 2023 05:15), Max: 98.2 (08 May 2023 05:15)  HR: 56 (08 May 2023 05:15) (56 - 66)  BP: 104/66 (08 May 2023 05:15) (102/60 - 111/63)  BP(mean): --  ABP: --  ABP(mean): --  RR: 18 (08 May 2023 05:15) (18 - 18)  SpO2: 99% (08 May 2023 05:15) (95% - 99%)    O2 Parameters below as of 07 May 2023 21:34  Patient On (Oxygen Delivery Method): nasal cannula            Adult Advanced Hemodynamics Last 24 Hrs  CVP(mm Hg): --  CVP(cm H2O): --  CO: --  CI: --  PA: --  PA(mean): --  PCWP: --  SVR: --  SVRI: --  PVR: --  PVRI: --  CAPILLARY BLOOD GLUCOSE        CAPILLARY BLOOD GLUCOSE        I&O's Summary    07 May 2023 07:01  -  08 May 2023 07:00  --------------------------------------------------------  IN: 0 mL / OUT: 1470 mL / NET: -1470 mL      Daily     Daily     PHYSICAL EXAMINATION:  HEAD:  Atraumatic, Normocephalic  EYES: EOMI, PERRLA, conjunctiva and sclera clear  MOUTH: no oral thrush  NECK: Supple, No JVD  CHEST/LUNG: Clear to auscultation bilaterally; No wheeze  HEART: Regular rate and rhythm; No murmurs, rubs, or gallops  ABDOMEN: Soft, Nontender, Nondistended; Bowel sounds present  EXTREMITIES:  2+ Peripheral Pulses, No clubbing, cyanosis, or edema  NEUROLOGY: AAOx1, RUE and LE 5/5 strength, no deficits on exam   SKIN: No rashes or lesions    LABS:         PATIENT: MARY JARVIS, MRN: [id number]    CHIEF COMPLAINT: Patient is a 77y old  Male who presents with a chief complaint of R sided weakness/numbness (07 May 2023 07:38)      INTERVAL HISTORY/OVERNIGHT EVENTS: No overnight events. Patient reports that he feels comfortable and has no acute complaints at this time. Patient still cannot verbalize why he is in the hospital or where he is, just states "Oh, I know that". Denies headaches, CP, SOB.     MEDICATIONS:  MEDICATIONS  (STANDING):  budesonide  80 MICROgram(s)/formoterol 4.5 MICROgram(s) Inhaler 2 Puff(s) Inhalation two times a day  chlorhexidine 4% Liquid 1 Application(s) Topical daily  dexAMETHasone     Tablet 4 milliGRAM(s) Oral every 12 hours  folic acid 1 milliGRAM(s) Oral daily  levETIRAcetam 500 milliGRAM(s) Oral two times a day  multivitamin 1 Tablet(s) Oral daily  nicotine -   7 mG/24Hr(s) Patch 1 Patch Transdermal daily  tiotropium 2.5 MICROgram(s) Inhaler 2 Puff(s) Inhalation daily    MEDICATIONS  (PRN):  acetaminophen     Tablet .. 650 milliGRAM(s) Oral every 6 hours PRN Temp greater or equal to 38C (100.4F), Mild Pain (1 - 3)  aluminum hydroxide/magnesium hydroxide/simethicone Suspension 30 milliLiter(s) Oral every 4 hours PRN Dyspepsia  melatonin 3 milliGRAM(s) Oral at bedtime PRN Insomnia  ondansetron Injectable 4 milliGRAM(s) IV Push every 8 hours PRN Nausea and/or Vomiting      ALLERGIES: Allergies    No Known Allergies    Intolerances        OBJECTIVE:  ICU Vital Signs Last 24 Hrs  T(C): 36.8 (08 May 2023 05:15), Max: 36.8 (08 May 2023 05:15)  T(F): 98.2 (08 May 2023 05:15), Max: 98.2 (08 May 2023 05:15)  HR: 56 (08 May 2023 05:15) (56 - 66)  BP: 104/66 (08 May 2023 05:15) (102/60 - 111/63)  BP(mean): --  ABP: --  ABP(mean): --  RR: 18 (08 May 2023 05:15) (18 - 18)  SpO2: 99% (08 May 2023 05:15) (95% - 99%)    O2 Parameters below as of 07 May 2023 21:34  Patient On (Oxygen Delivery Method): nasal cannula            Adult Advanced Hemodynamics Last 24 Hrs  CVP(mm Hg): --  CVP(cm H2O): --  CO: --  CI: --  PA: --  PA(mean): --  PCWP: --  SVR: --  SVRI: --  PVR: --  PVRI: --  CAPILLARY BLOOD GLUCOSE        CAPILLARY BLOOD GLUCOSE        I&O's Summary    07 May 2023 07:01  -  08 May 2023 07:00  --------------------------------------------------------  IN: 0 mL / OUT: 1470 mL / NET: -1470 mL      Daily     Daily     PHYSICAL EXAMINATION:  HEAD:  Atraumatic, Normocephalic  EYES: EOMI, PERRLA, conjunctiva and sclera clear  MOUTH: no oral thrush  NECK: Supple, No JVD  CHEST/LUNG: Clear to auscultation bilaterally; No wheeze  HEART: Regular rate and rhythm; No murmurs, rubs, or gallops  ABDOMEN: Soft, Nontender, Nondistended; Bowel sounds present  EXTREMITIES:  2+ Peripheral Pulses, No clubbing, cyanosis, or edema  NEUROLOGY: AAOx1, RUE and LE 5/5 strength, no deficits on exam   SKIN: No rashes or lesions    LABS:         PATIENT: MARY JARVIS, MRN: [id number]    CHIEF COMPLAINT: Patient is a 77y old  Male who presents with a chief complaint of R sided weakness/numbness (07 May 2023 07:38)      INTERVAL HISTORY/OVERNIGHT EVENTS: No overnight events. Patient reports that he feels comfortable and has no acute complaints at this time. Patient still cannot verbalize why he is in the hospital or where he is, just states "Oh, I know that". Denies headaches, CP, SOB.     MEDICATIONS:  MEDICATIONS  (STANDING):  budesonide  80 MICROgram(s)/formoterol 4.5 MICROgram(s) Inhaler 2 Puff(s) Inhalation two times a day  chlorhexidine 4% Liquid 1 Application(s) Topical daily  dexAMETHasone     Tablet 4 milliGRAM(s) Oral every 12 hours  folic acid 1 milliGRAM(s) Oral daily  levETIRAcetam 500 milliGRAM(s) Oral two times a day  multivitamin 1 Tablet(s) Oral daily  nicotine -   7 mG/24Hr(s) Patch 1 Patch Transdermal daily  tiotropium 2.5 MICROgram(s) Inhaler 2 Puff(s) Inhalation daily    MEDICATIONS  (PRN):  acetaminophen     Tablet .. 650 milliGRAM(s) Oral every 6 hours PRN Temp greater or equal to 38C (100.4F), Mild Pain (1 - 3)  aluminum hydroxide/magnesium hydroxide/simethicone Suspension 30 milliLiter(s) Oral every 4 hours PRN Dyspepsia  melatonin 3 milliGRAM(s) Oral at bedtime PRN Insomnia  ondansetron Injectable 4 milliGRAM(s) IV Push every 8 hours PRN Nausea and/or Vomiting      ALLERGIES: Allergies    No Known Allergies    Intolerances        OBJECTIVE:  ICU Vital Signs Last 24 Hrs  T(C): 36.8 (08 May 2023 05:15), Max: 36.8 (08 May 2023 05:15)  T(F): 98.2 (08 May 2023 05:15), Max: 98.2 (08 May 2023 05:15)  HR: 56 (08 May 2023 05:15) (56 - 66)  BP: 104/66 (08 May 2023 05:15) (102/60 - 111/63)  BP(mean): --  ABP: --  ABP(mean): --  RR: 18 (08 May 2023 05:15) (18 - 18)  SpO2: 99% (08 May 2023 05:15) (95% - 99%)    O2 Parameters below as of 07 May 2023 21:34  Patient On (Oxygen Delivery Method): nasal cannula            Adult Advanced Hemodynamics Last 24 Hrs  CVP(mm Hg): --  CVP(cm H2O): --  CO: --  CI: --  PA: --  PA(mean): --  PCWP: --  SVR: --  SVRI: --  PVR: --  PVRI: --  CAPILLARY BLOOD GLUCOSE        CAPILLARY BLOOD GLUCOSE        I&O's Summary    07 May 2023 07:01  -  08 May 2023 07:00  --------------------------------------------------------  IN: 0 mL / OUT: 1470 mL / NET: -1470 mL      Daily     Daily     PHYSICAL EXAMINATION:  HEAD:  Atraumatic, Normocephalic  EYES: EOMI, PERRLA, conjunctiva and sclera clear  MOUTH: no oral thrush.  NECK: Supple, No JVD  CHEST/LUNG: Clear to auscultation bilaterally; No wheeze  HEART: Regular rate and rhythm; No murmurs, rubs, or gallops  ABDOMEN: Soft, Nontender, Nondistended; Bowel sounds present  EXTREMITIES:  2+ Peripheral Pulses, No clubbing, cyanosis, or edema  NEUROLOGY: AAOx1, RUE and LE 5/5 strength, no deficits on exam   SKIN: No rashes or lesions    LABS:

## 2023-05-08 NOTE — PROGRESS NOTE ADULT - ASSESSMENT
77M with EtOH use disorder, no known PMHx as hasn't seen doctors in decades, presents with R sided weakness/numbness x 2 day, found to have Left parietal mass w/ vasogenic edema. Was placed on ativan taper for EtOH withdrawal however became very lethargic, was discontinued. Pending biopsy of lymph node, family/surrogate agreed to pursue biopsy s/p EBUS 5/5 - pending results to determine treatment plan.

## 2023-05-08 NOTE — PROGRESS NOTE ADULT - ASSESSMENT
full note to f/u:   The patient was seen and examined.   I am not completely sure but I believe the patient may have capacity to decide about assigning a HCP and maybe code status. Will advise for psych gunnar to determine if he has capacity to decide about them.   I had a lengthy d/w his sister that indicated she and the patient's partner were waiting for Bx results to better define GOC.         Vaughn Flanagan MD  Associate Chief Geriatrics and Palliative Care (GaP) [facility] Director [place] Consult Service   , [employer] at [facility]      Please contact me via Teams from Monday through Friday between 9am-5pm. If not answering, please call the palliative care pager [phone]    After 5pm and on weekends, please see the contact information below:    In the event of newly developing, evolving, or worsening symptoms, please contact the Palliative Medicine team via pager (if the patient is at [facility] #[phone] or if the patient is at [facility] #[phone]) The Geriatric and Palliative Medicine service has coverage 24 hours a day/ 7 days a week to provide medical recommendations regarding symptom management needs via telephone 77M with EtOH use disorder, no known PMHx as hasn't seen doctors in decades, presents with R sided weakness/numbness x 2 day, found to have Left parietal mass w/ vasogenic edema. Also with Mediastinal and hilar adenopathy He was placed on ativan taper for EtOH withdrawal however became very lethargic, so it was discontinued. Pending biopsy of lymph node. Geriatrics and Palliative Medicine (GaP) Team was called for goals of care.

## 2023-05-08 NOTE — PROGRESS NOTE ADULT - PROBLEM SELECTOR PLAN 4
Seen by palliative care  Surrogate/family want to pursue biopsy for pt for tissue dx Seen by palliative care and ethics   HCP is spouse: oZe, although sister Carli also part of discussion   Surrogate/family want to pursue biopsy for pt for tissue dx

## 2023-05-08 NOTE — PROGRESS NOTE ADULT - ATTENDING COMMENTS
Patient seen and examined in AM  77M with EtOH use disorder presented with R sided weakness/numbness x 2 day and AMS. MRI brain shows multiple enhancing masses within the brain, with moderate surrounding edema which demonstrates mass effect on the posterior horn of the LEFT lateral ventricle.   continue Decadron  s/p bronchoscopy/EBUS 5/5 for lymph node biopsy (mediastinal and hilar adenopathy).   Pending pathology results.   no change in mental state

## 2023-05-09 NOTE — BH CONSULTATION LIAISON ASSESSMENT NOTE - RISK ASSESSMENT
Acute: current hospitalization  Chronic: alcohol use disorder  Protective: identities reasons for living; future oriented

## 2023-05-09 NOTE — BH CONSULTATION LIAISON ASSESSMENT NOTE - SUMMARY
This is a 76y/o man with a history of alcohol use disorder presenting with right-sided weakness/numbness (found to have left parietal mass w/ vasogenic edema). Psychiatry consulted to determine capacity to determine health care proxy and DNR/DNI.     Patient able to express a clear and consistent choice of health care proxy (Michelle). He understands and appreciates that a proxy would make decisions in the event he is unable to do so. Given his difficulties with memory, it is recommended that he make decisions about DNR and DNI alongside said health care proxy. Message left for sister.     The patient does not have any acute psychiatric pathology and does not present an acute risk of harm to himself or others. He may need assistance with alcohol use treatment, although he is pre-contemplative (consider SBIRT involvement [phone]). This is a 76y/o man with a history of alcohol use disorder presenting with right-sided weakness/numbness (found to have left parietal mass w/ vasogenic edema). Psychiatry consulted to determine capacity to determine health care proxy and DNR/DNI.     Patient able to express a clear and consistent choice of health care proxy his sister, (Michelle). He understands and appreciates that a proxy would make decisions in the event he is unable to do so. Given his difficulties with memory, it is recommended that he make decisions about DNR and DNI alongside said health care proxy. Message left for sister.     The patient does not have any acute psychiatric pathology and does not present an acute risk of harm to himself or others. He may need assistance with alcohol use treatment, although he is pre-contemplative (consider SBIRT involvement [phone]).

## 2023-05-09 NOTE — BH CONSULTATION LIAISON ASSESSMENT NOTE - NSBHATTESTCOMMENTATTENDFT_PSY_A_CORE
Pt is a 76y/o man with a history of alcohol use disorder presenting with right-sided weakness/numbness (found to have left parietal mass w/ vasogenic edema). Psychiatry consulted to determine capacity to determine health care proxy and DNR/DNI.   Patient able to express a clear and consistent choice of health care proxy his sister, (Michelle). He understands and appreciates that a proxy would make decisions in the event he is unable to do so. Given his difficulties with memory, he does not currently have capacity to participate in decisions involving DNR/DNI and would need his health care proxy to sign for this.  He is currently unable to recall and verbalize what type of medical problems he has and was unable to recall other details such as names of family members, and the name of the hospital and currently year.   The patient does not have any acute psychiatric symptoms and does not present an acute risk of harm to himself or others. Agree that he may need assistance with alcohol use treatment, and may benefit from SBT involvement [phone], for outpt follow up.

## 2023-05-09 NOTE — BH CONSULTATION LIAISON ASSESSMENT NOTE - NSBHMSEGROOM_PSY_A_CORE
Relevant Problems   No relevant active problems       Anesthetic History   No history of anesthetic complications            Review of Systems / Medical History  Patient summary reviewed, nursing notes reviewed and pertinent labs reviewed    Pulmonary  Within defined limits                 Neuro/Psych   Within defined limits           Cardiovascular  Within defined limits                     GI/Hepatic/Renal  Within defined limits              Endo/Other  Within defined limits           Other Findings   Comments: Oropharyngeal CA           Physical Exam    Airway  Mallampati: II  TM Distance: > 6 cm  Neck ROM: normal range of motion   Mouth opening: Normal     Cardiovascular  Regular rate and rhythm,  S1 and S2 normal,  no murmur, click, rub, or gallop             Dental    Dentition: Edentulous     Pulmonary  Breath sounds clear to auscultation               Abdominal  GI exam deferred       Other Findings            Anesthetic Plan    ASA: 3  Anesthesia type: general          Induction: Intravenous  Anesthetic plan and risks discussed with: Patient Fair

## 2023-05-09 NOTE — BH CONSULTATION LIAISON ASSESSMENT NOTE - CURRENT MEDICATION
MEDICATIONS  (STANDING):  budesonide  80 MICROgram(s)/formoterol 4.5 MICROgram(s) Inhaler 2 Puff(s) Inhalation two times a day  chlorhexidine 4% Liquid 1 Application(s) Topical daily  dexAMETHasone     Tablet 4 milliGRAM(s) Oral every 12 hours  folic acid 1 milliGRAM(s) Oral daily  levETIRAcetam 500 milliGRAM(s) Oral two times a day  multivitamin 1 Tablet(s) Oral daily  nicotine -   7 mG/24Hr(s) Patch 1 Patch Transdermal daily  pantoprazole    Tablet 40 milliGRAM(s) Oral before breakfast  tiotropium 2.5 MICROgram(s) Inhaler 2 Puff(s) Inhalation daily    MEDICATIONS  (PRN):  acetaminophen     Tablet .. 650 milliGRAM(s) Oral every 6 hours PRN Temp greater or equal to 38C (100.4F), Mild Pain (1 - 3)  aluminum hydroxide/magnesium hydroxide/simethicone Suspension 30 milliLiter(s) Oral every 4 hours PRN Dyspepsia  melatonin 3 milliGRAM(s) Oral at bedtime PRN Insomnia  ondansetron Injectable 4 milliGRAM(s) IV Push every 8 hours PRN Nausea and/or Vomiting

## 2023-05-09 NOTE — PROGRESS NOTE ADULT - PROBLEM SELECTOR PLAN 4
Seen by palliative care and ethics   HCP is spouse: Zoe, although sister Carli also part of discussion   Surrogate/family want to pursue biopsy for pt for tissue dx  - may need psych eval for capacity assessment Seen by palliative care and ethics   HCP is spouse: Zoe, although sister Carli also part of discussion   Surrogate/family want to pursue biopsy for pt for tissue dx  - psych eval for capacity assessment  - assigned his sister as HCP

## 2023-05-09 NOTE — PROGRESS NOTE ADULT - PROBLEM SELECTOR PLAN 1
R sided weakness/numbness x 2 day, found to have Left parietal mass w/ vasogenic edema  - CTA neck with Left vertebral stenosis but good basilar filling.  - appreciate NSG recs: recommend MRI  MRI head with multiple masses, concerning for mets  CT chest/abd/pelv without signs of other malignancy, does have hilar lymphadenopathy   Ethics consulted for complicated GOC, spouse is the legal decision maker  Biopsy of lymph node EBUS 5/5   Onc consulted, pending results   - f/u biopsy results   - continue decadron 4mg q12hr, taper.  - c/w PPI   - continue keppra 500mg BID R sided weakness/numbness x 2 day, found to have Left parietal mass w/ vasogenic edema  - CTA neck with Left vertebral stenosis but good basilar filling.  - appreciate NSG recs: recommend MRI  MRI head with multiple masses, concerning for mets  CT chest/abd/pelv without signs of other malignancy, does have hilar lymphadenopathy   Ethics consulted for complicated GOC, spouse is the legal decision maker  Biopsy of lymph node EBUS 5/5   Onc consulted, pending results   - f/u biopsy results   - continue decadron 4mg q12hr, taper  - c/w PPI   - continue keppra 500mg BID

## 2023-05-09 NOTE — BH CONSULTATION LIAISON ASSESSMENT NOTE - NSBHCHARTREVIEWVS_PSY_A_CORE FT
Vital Signs Last 24 Hrs  T(C): 36.6 (09 May 2023 12:24), Max: 36.6 (08 May 2023 20:49)  T(F): 97.9 (09 May 2023 12:24), Max: 97.9 (08 May 2023 20:49)  HR: 86 (09 May 2023 12:24) (62 - 86)  BP: 125/71 (09 May 2023 12:24) (104/61 - 125/71)  BP(mean): --  RR: 18 (09 May 2023 12:24) (18 - 18)  SpO2: 95% (09 May 2023 12:24) (95% - 99%)    Parameters below as of 09 May 2023 12:24  Patient On (Oxygen Delivery Method): room air

## 2023-05-09 NOTE — PROGRESS NOTE ADULT - SUBJECTIVE AND OBJECTIVE BOX
PATIENT: MARY JARVIS, MRN: [id number]    CHIEF COMPLAINT: Patient is a 77y old  Male who presents with a chief complaint of R sided weakness/numbness (08 May 2023 20:18)      INTERVAL HISTORY/OVERNIGHT EVENTS: No overnight events.     MEDICATIONS:  MEDICATIONS  (STANDING):  budesonide  80 MICROgram(s)/formoterol 4.5 MICROgram(s) Inhaler 2 Puff(s) Inhalation two times a day  chlorhexidine 4% Liquid 1 Application(s) Topical daily  dexAMETHasone     Tablet 4 milliGRAM(s) Oral every 12 hours  folic acid 1 milliGRAM(s) Oral daily  levETIRAcetam 500 milliGRAM(s) Oral two times a day  multivitamin 1 Tablet(s) Oral daily  nicotine -   7 mG/24Hr(s) Patch 1 Patch Transdermal daily  pantoprazole    Tablet 40 milliGRAM(s) Oral before breakfast  tiotropium 2.5 MICROgram(s) Inhaler 2 Puff(s) Inhalation daily    MEDICATIONS  (PRN):  acetaminophen     Tablet .. 650 milliGRAM(s) Oral every 6 hours PRN Temp greater or equal to 38C (100.4F), Mild Pain (1 - 3)  aluminum hydroxide/magnesium hydroxide/simethicone Suspension 30 milliLiter(s) Oral every 4 hours PRN Dyspepsia  melatonin 3 milliGRAM(s) Oral at bedtime PRN Insomnia  ondansetron Injectable 4 milliGRAM(s) IV Push every 8 hours PRN Nausea and/or Vomiting      ALLERGIES: Allergies    No Known Allergies    Intolerances        OBJECTIVE:  ICU Vital Signs Last 24 Hrs  T(C): 36.1 (09 May 2023 05:02), Max: 36.6 (08 May 2023 14:16)  T(F): 97 (09 May 2023 05:02), Max: 97.9 (08 May 2023 14:16)  HR: 62 (09 May 2023 05:02) (62 - 68)  BP: 107/61 (09 May 2023 05:02) (104/61 - 117/60)  BP(mean): --  ABP: --  ABP(mean): --  RR: 18 (09 May 2023 05:02) (18 - 18)  SpO2: 98% (09 May 2023 05:02) (98% - 99%)    O2 Parameters below as of 09 May 2023 05:02  Patient On (Oxygen Delivery Method): room air            Adult Advanced Hemodynamics Last 24 Hrs  CVP(mm Hg): --  CVP(cm H2O): --  CO: --  CI: --  PA: --  PA(mean): --  PCWP: --  SVR: --  SVRI: --  PVR: --  PVRI: --  CAPILLARY BLOOD GLUCOSE      POCT Blood Glucose.: 94 mg/dL (09 May 2023 06:12)  POCT Blood Glucose.: 153 mg/dL (08 May 2023 14:43)    CAPILLARY BLOOD GLUCOSE      POCT Blood Glucose.: 94 mg/dL (09 May 2023 06:12)    I&O's Summary    08 May 2023 07:01  -  09 May 2023 07:00  --------------------------------------------------------  IN: 300 mL / OUT: 1175 mL / NET: -875 mL      Daily     Daily     PHYSICAL EXAMINATION:  HEAD:  Atraumatic, Normocephalic  EYES: EOMI, PERRLA, conjunctiva and sclera clear  MOUTH: no oral thrush.  NECK: Supple, No JVD  CHEST/LUNG: Clear to auscultation bilaterally; No wheeze  HEART: Regular rate and rhythm; No murmurs, rubs, or gallops  ABDOMEN: Soft, Nontender, Nondistended; Bowel sounds present  EXTREMITIES:  2+ Peripheral Pulses, No clubbing, cyanosis, or edema  NEUROLOGY: AAOx1, RUE and LE 5/5 strength, no deficits on exam   SKIN: No rashes or lesions    LABS:                          13.4   12.72 )-----------( 192      ( 09 May 2023 06:46 )             38.3        PATIENT: MARY JARVIS, MRN: [id number]    CHIEF COMPLAINT: Patient is a 77y old  Male who presents with a chief complaint of R sided weakness/numbness (08 May 2023 20:18)      INTERVAL HISTORY/OVERNIGHT EVENTS: No overnight events. Patient comfortably laying in bed, without acute complaints.     MEDICATIONS:  MEDICATIONS  (STANDING):  budesonide  80 MICROgram(s)/formoterol 4.5 MICROgram(s) Inhaler 2 Puff(s) Inhalation two times a day  chlorhexidine 4% Liquid 1 Application(s) Topical daily  dexAMETHasone     Tablet 4 milliGRAM(s) Oral every 12 hours  folic acid 1 milliGRAM(s) Oral daily  levETIRAcetam 500 milliGRAM(s) Oral two times a day  multivitamin 1 Tablet(s) Oral daily  nicotine -   7 mG/24Hr(s) Patch 1 Patch Transdermal daily  pantoprazole    Tablet 40 milliGRAM(s) Oral before breakfast  tiotropium 2.5 MICROgram(s) Inhaler 2 Puff(s) Inhalation daily    MEDICATIONS  (PRN):  acetaminophen     Tablet .. 650 milliGRAM(s) Oral every 6 hours PRN Temp greater or equal to 38C (100.4F), Mild Pain (1 - 3)  aluminum hydroxide/magnesium hydroxide/simethicone Suspension 30 milliLiter(s) Oral every 4 hours PRN Dyspepsia  melatonin 3 milliGRAM(s) Oral at bedtime PRN Insomnia  ondansetron Injectable 4 milliGRAM(s) IV Push every 8 hours PRN Nausea and/or Vomiting      ALLERGIES: Allergies    No Known Allergies    Intolerances        OBJECTIVE:  ICU Vital Signs Last 24 Hrs  T(C): 36.1 (09 May 2023 05:02), Max: 36.6 (08 May 2023 14:16)  T(F): 97 (09 May 2023 05:02), Max: 97.9 (08 May 2023 14:16)  HR: 62 (09 May 2023 05:02) (62 - 68)  BP: 107/61 (09 May 2023 05:02) (104/61 - 117/60)  BP(mean): --  ABP: --  ABP(mean): --  RR: 18 (09 May 2023 05:02) (18 - 18)  SpO2: 98% (09 May 2023 05:02) (98% - 99%)    O2 Parameters below as of 09 May 2023 05:02  Patient On (Oxygen Delivery Method): room air            Adult Advanced Hemodynamics Last 24 Hrs  CVP(mm Hg): --  CVP(cm H2O): --  CO: --  CI: --  PA: --  PA(mean): --  PCWP: --  SVR: --  SVRI: --  PVR: --  PVRI: --  CAPILLARY BLOOD GLUCOSE      POCT Blood Glucose.: 94 mg/dL (09 May 2023 06:12)  POCT Blood Glucose.: 153 mg/dL (08 May 2023 14:43)    CAPILLARY BLOOD GLUCOSE      POCT Blood Glucose.: 94 mg/dL (09 May 2023 06:12)    I&O's Summary    08 May 2023 07:01  -  09 May 2023 07:00  --------------------------------------------------------  IN: 300 mL / OUT: 1175 mL / NET: -875 mL      Daily     Daily     PHYSICAL EXAMINATION:  HEAD:  Atraumatic, Normocephalic  EYES: EOMI, PERRLA, conjunctiva and sclera clear  MOUTH: no oral thrush.  NECK: Supple, No JVD  CHEST/LUNG: Clear to auscultation bilaterally; No wheeze  HEART: Regular rate and rhythm; No murmurs, rubs, or gallops  ABDOMEN: Soft, Nontender, Nondistended; Bowel sounds present  EXTREMITIES:  2+ Peripheral Pulses, No clubbing, cyanosis, or edema  NEUROLOGY: AAOx1, RUE and LE 5/5 strength, no deficits on exam   SKIN: No rashes or lesions    LABS:                          13.4   12.72 )-----------( 192      ( 09 May 2023 06:46 )             38.3

## 2023-05-09 NOTE — BH CONSULTATION LIAISON ASSESSMENT NOTE - NSBHCHARTREVIEWLAB_PSY_A_CORE FT
13.4   12.72 )-----------( 192      ( 09 May 2023 06:46 )             38.3     05-09    138  |  103  |  28<H>  ----------------------------<  94  4.1   |  25  |  0.90    Ca    9.3      09 May 2023 06:46  Phos  2.9     05-09  Mg     2.0     05-09

## 2023-05-09 NOTE — PROGRESS NOTE ADULT - ATTENDING COMMENTS
Patient seen and examined in AM  77M with EtOH use disorder presented with R sided weakness/numbness x 2 day and AMS. MRI brain shows multiple enhancing masses within the brain, with moderate surrounding edema which demonstrates mass effect on the posterior horn of the LEFT lateral ventricle.   continue Decadron  s/p bronchoscopy/EBUS 5/5 for lymph node biopsy (mediastinal and hilar adenopathy).   Pending pathology results.   Patient does not need to wait for bx for d/c, but further discussions with CM regarding NSx treatment and living situation still need to be clarified as will likely not be able to get treatment while in [facility].

## 2023-05-09 NOTE — BH CONSULTATION LIAISON ASSESSMENT NOTE - HPI (INCLUDE ILLNESS QUALITY, SEVERITY, DURATION, TIMING, CONTEXT, MODIFYING FACTORS, ASSOCIATED SIGNS AND SYMPTOMS)
This is a 78y/o man with a history of alcohol use disorder presenting with right-sided weakness/numbness (found to have left parietal mass w/ vasogenic edema). Psychiatry consulted to determine capacity to determine health care proxy and DNR/DNI.     Patient incompletely oriented but able to engage with interview.    The patient states that he came to the hospital because he fell after drinking and that he doesn't know what he is being treated for. He reports drinking four shots of "Bony white rum" every day. He denies any psychiatric history and says that the alcohol "steadies [him]." He also denies any past history of treatment for alcohol use disorder. He says that he smokes one pack of cigarettes per week but denies other substance use. He denies a history of violence as well as current and past SIIP and HIIP.    The patient says that he has a "sharp memory" but was unable to recall the names of his four grandchildren. 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 He denies being  and says that he "has many women all around."    He reports that he would like his sister (Michelle) or brother (Bill) to be his proxy if he is unable to make a health decision. He expressed this multiple times during interview.

## 2023-05-09 NOTE — BH CONSULTATION LIAISON ASSESSMENT NOTE - NSBHCONSULTFOLLOWAFTERCARE_PSY_A_CORE FT
follow up with neurology for dementia follow up with neurology for dementia  may benefit from Alcohol rehab for his alcohol use disorder

## 2023-05-09 NOTE — BH CONSULTATION LIAISON ASSESSMENT NOTE - VIOLENCE PROTECTIVE FACTORS:
Residential stability/Employment stability/Affective Stability/Insight into violence risk and need for management/treatment

## 2023-05-10 NOTE — CHART NOTE - NSCHARTNOTEFT_GEN_A_CORE
BERTRAND consulted to assist in GOC discussion in the setting of patient with advanced illness. LCSW requested to contact patient's sister/HCP Michelle today via telephone for supportive intervention. Chart reviewed, previous notes read and appreciated. Case also discussed with primary JOSE Frazier as well.    LCSW placed call to Michelle and first introduced self and role in patient care. Michelle verbalized understanding and was receptive to call today. LCSW next inquired into Michelle's coping, and Michelle notes coping as well as possible. Michelle states that she has much support in her other brother and family/friends to assist in her coping throughout this process, and notes no issues or concerns. Michelle states that she is retired and not currently working which has been helpful in navigating patient's medical status. Michelle continued today by stating that "it is what it is" in regards to patient's situation, and discussed her role as primary support and decision maker largely stemming from patient's significant other/mother of his five children as being uncomfortable in managing the decision-making role. Michelle states that she took over decision-making as "someone had to do it", and notes trying her best to support patient. LCSW validated this with Michelle today, and acknowledged the love, support, and advocacy shown to patient throughout this process. LCSW additionally validated the difficulty in managing roles as sister, HCP/decision-maker, and caregiver of patient, and assured Michelle of ongoing support and availability. Emotional support and active listening provided today as well, and Michelle verbalized agreement and appreciation.    BERTRAND and SONW will continue to follow this case.

## 2023-05-10 NOTE — PROGRESS NOTE ADULT - ATTENDING COMMENTS
Patient seen and examined in AM  no events   77M with EtOH use disorder presented with R sided weakness/numbness x 2 day and AMS. MRI brain shows multiple enhancing masses within the brain, with moderate surrounding edema which demonstrates mass effect on the posterior horn of the LEFT lateral ventricle.   on  Decadron along with lymph node biopsy (mediastinal and hilar adenopathy).   s/p bronchoscopy Pending pathology results.   Leukocytosis - secondary to steroid

## 2023-05-10 NOTE — PROGRESS NOTE ADULT - CONVERSATION DETAILS
full note to f/u.   d/w the HCP that gave verbalized understanding about the patient's Dx (Likely metastatic lung CA). I also informed her about psych eval and their inputs regarding capacity to assign a HCP; however, his lack of capacity to decide about code status. At this time, she is waiting for Onc inputs to further decide about GOC and ACP.         Vaughn Flanaagn MD  Associate Chief Geriatrics and Palliative Care (GaP) [facility] Director [place] Consult Service   , [employer] [facility]      Please contact me via Teams from Monday through Friday between 9am-5pm. If not answering, please call the palliative care pager [phone]    After 5pm and on weekends, please see the contact information below:    In the event of newly developing, evolving, or worsening symptoms, please contact the Palliative Medicine team via pager (if the patient is at [facility] #[phone] or if the patient is at [facility] #[phone]) The Geriatric and Palliative Medicine service has coverage 24 hours a day/ 7 days a week to provide medical recommendations regarding symptom management needs via telephone Appreciated psych gunnar and the primary SW completing a HCP form. As indicated above, at this point, the HCP is his sister.   I called the HCP that gave verbalized understanding about the patient's Dx (Likely metastatic lung CA). I also informed her about psych gunnar and their inputs regarding capacity to assign a HCP; however, his lack of capacity to decide about code status. At this time, the HCP is waiting for Onc inputs to further decide about GOC and ACP.     Primary team to consult Onc so they can comment on treatment options and prognosis. Please make sure Onc keeps into account the patient's social situation (please read my GOC note dated 5/8 for details. If DMT is thought then he will need to go to a NH, move with his sister [which seems unlikely] or find a caregiver which will be able to support him through what DMT will require) so GOC and ACP can be re-addressed.         Vaughn Flanagan MD  Associate Chief Geriatrics and Palliative Care (GaP) [facility]   GaP Consult Service   , [employer] at [facility]      Please contact me via Teams from Monday through Friday between 9am-5pm. If not answering, please call the palliative care pager [phone]    After 5pm and on weekends, please see the contact information below:    In the event of newly developing, evolving, or worsening symptoms, please contact the Palliative Medicine team via pager (if the patient is at [facility] #[phone] or if the patient is at [facility] #[phone]) The Geriatric and Palliative Medicine service has coverage 24 hours a day/ 7 days a week to provide medical recommendations regarding symptom management needs via telephone

## 2023-05-10 NOTE — PROGRESS NOTE ADULT - SUBJECTIVE AND OBJECTIVE BOX
Geriatrics and Palliative Medicine (GaP) Team is f/u for goals of care and advanced care planning. Chart was reviewed and there was not any documentation regarding distressful symptoms.     MEDICATIONS  (STANDING):  budesonide  80 MICROgram(s)/formoterol 4.5 MICROgram(s) Inhaler 2 Puff(s) Inhalation two times a day  chlorhexidine 4% Liquid 1 Application(s) Topical daily  dexAMETHasone     Tablet 4 milliGRAM(s) Oral every 12 hours  folic acid 1 milliGRAM(s) Oral daily  levETIRAcetam 500 milliGRAM(s) Oral two times a day  multivitamin 1 Tablet(s) Oral daily  nicotine -   7 mG/24Hr(s) Patch 1 Patch Transdermal daily  pantoprazole    Tablet 40 milliGRAM(s) Oral before breakfast  tiotropium 2.5 MICROgram(s) Inhaler 2 Puff(s) Inhalation daily    MEDICATIONS  (PRN):  acetaminophen     Tablet .. 650 milliGRAM(s) Oral every 6 hours PRN Temp greater or equal to 38C (100.4F), Mild Pain (1 - 3)  aluminum hydroxide/magnesium hydroxide/simethicone Suspension 30 milliLiter(s) Oral every 4 hours PRN Dyspepsia  melatonin 3 milliGRAM(s) Oral at bedtime PRN Insomnia  ondansetron Injectable 4 milliGRAM(s) IV Push every 8 hours PRN Nausea and/or VomitingOBJECTIVE:  ICU Vital Signs Last 24 Hrs  T(C): 36.8 (10 May 2023 04:56), Max: 36.8 (09 May 2023 21:15)  T(F): 98.3 (10 May 2023 04:56), Max: 98.3 (10 May 2023 04:56)  HR: 60 (10 May 2023 04:56) (58 - 86)  BP: 107/56 (10 May 2023 04:56) (107/56 - 125/71)  BP(mean): --  ABP: --  ABP(mean): --  RR: 18 (10 May 2023 04:56) (18 - 18)  SpO2: 96% (10 May 2023 04:56) (95% - 97%)    O2 Parameters below as of 10 May 2023 04:56  Patient On (Oxygen Delivery Method): room air                      13.4   12.72 )-----------( 192      ( 09 May 2023 06:46 )             38.3     05-09    138  |  103  |  28<H>  ----------------------------<  94  4.1   |  25  |  0.90    Ca    9.3      09 May 2023 06:46  Phos  2.9     05-09  Mg     2.0     05-09  Pathology report:   POSITIVE FOR MALIGNANT CELLS.   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 .

## 2023-05-10 NOTE — PROGRESS NOTE ADULT - PROBLEM SELECTOR PLAN 3
On 5/11, I will be on administrative duties. However, will be back to f/u on this case on 5/12. Meanwhile, the primary teams is to call Onc so Rx options and prognosis can be better defined.     [place] Social work inputs and psychosocial support were noticed (See [place] SW note dated 5/10)     Vaughn Flanagan MD  Associate Chief Geriatrics and Palliative Care (GaP) [facility] Director [place] Consult Service   , [employer] at [facility]      Please contact me via Teams from Monday through Friday between 9am-5pm. If not answering, please call the palliative care pager [phone]    After 5pm and on weekends, please see the contact information below:    In the event of newly developing, evolving, or worsening symptoms, please contact the Palliative Medicine team via pager (if the patient is at [facility] #[phone] or if the patient is at [facility] #[phone]) The Geriatric and Palliative Medicine service has coverage 24 hours a day/ 7 days a week to provide medical recommendations regarding symptom management needs via telephone

## 2023-05-10 NOTE — CHART NOTE - NSCHARTNOTEFT_GEN_A_CORE
Interventional Pulm Chart Note    Path results from 4R consistent with metastatic adenocarcinoma of pulmonary origin. Recommend medical oncology consultation/follow up.    Troy Silva MD  Director, Interventional Pulmonology & Bronchoscopy Interventional Pulm Chart Note    Path results from 4R consistent with metastatic adenocarcinoma of pulmonary origin. Recommend medical oncology consultation/follow up.  Results discussed SARA Silva MD  Director, Interventional Pulmonology & Bronchoscopy

## 2023-05-10 NOTE — PROGRESS NOTE ADULT - FAMILY/RELATIVE
Brother, Alex. Sister, Michelle Yosi [phone]
Michelle Deluca (HCP/sister): [phone]
Michelle Deluca (sister): [phone]

## 2023-05-10 NOTE — PROGRESS NOTE ADULT - SUBJECTIVE AND OBJECTIVE BOX
PATIENT: MARY JARVIS, MRN: [id number]    CHIEF COMPLAINT: Patient is a 77y old  Male who presents with a chief complaint of R sided weakness/numbness (09 May 2023 07:18)      INTERVAL HISTORY/OVERNIGHT EVENTS: No overnight events. Patient was seen by psych, re-iterated that he wanted his sister to make decisions for him. Patient denied any complaints this AM, denies lightheadedness, headaches, vision changes, CP, SOB.       MEDICATIONS:  MEDICATIONS  (STANDING):  budesonide  80 MICROgram(s)/formoterol 4.5 MICROgram(s) Inhaler 2 Puff(s) Inhalation two times a day  chlorhexidine 4% Liquid 1 Application(s) Topical daily  dexAMETHasone     Tablet 4 milliGRAM(s) Oral every 12 hours  folic acid 1 milliGRAM(s) Oral daily  levETIRAcetam 500 milliGRAM(s) Oral two times a day  multivitamin 1 Tablet(s) Oral daily  nicotine -   7 mG/24Hr(s) Patch 1 Patch Transdermal daily  pantoprazole    Tablet 40 milliGRAM(s) Oral before breakfast  tiotropium 2.5 MICROgram(s) Inhaler 2 Puff(s) Inhalation daily    MEDICATIONS  (PRN):  acetaminophen     Tablet .. 650 milliGRAM(s) Oral every 6 hours PRN Temp greater or equal to 38C (100.4F), Mild Pain (1 - 3)  aluminum hydroxide/magnesium hydroxide/simethicone Suspension 30 milliLiter(s) Oral every 4 hours PRN Dyspepsia  melatonin 3 milliGRAM(s) Oral at bedtime PRN Insomnia  ondansetron Injectable 4 milliGRAM(s) IV Push every 8 hours PRN Nausea and/or Vomiting      ALLERGIES: Allergies    No Known Allergies    Intolerances        OBJECTIVE:  ICU Vital Signs Last 24 Hrs  T(C): 36.8 (10 May 2023 04:56), Max: 36.8 (09 May 2023 21:15)  T(F): 98.3 (10 May 2023 04:56), Max: 98.3 (10 May 2023 04:56)  HR: 60 (10 May 2023 04:56) (58 - 86)  BP: 107/56 (10 May 2023 04:56) (107/56 - 125/71)  BP(mean): --  ABP: --  ABP(mean): --  RR: 18 (10 May 2023 04:56) (18 - 18)  SpO2: 96% (10 May 2023 04:56) (95% - 97%)    O2 Parameters below as of 10 May 2023 04:56  Patient On (Oxygen Delivery Method): room air            Adult Advanced Hemodynamics Last 24 Hrs  CVP(mm Hg): --  CVP(cm H2O): --  CO: --  CI: --  PA: --  PA(mean): --  PCWP: --  SVR: --  SVRI: --  PVR: --  PVRI: --  CAPILLARY BLOOD GLUCOSE      POCT Blood Glucose.: 137 mg/dL (10 May 2023 05:58)  POCT Blood Glucose.: 110 mg/dL (09 May 2023 17:44)    CAPILLARY BLOOD GLUCOSE      POCT Blood Glucose.: 137 mg/dL (10 May 2023 05:58)    I&O's Summary    09 May 2023 07:01  -  10 May 2023 07:00  --------------------------------------------------------  IN: 400 mL / OUT: 1200 mL / NET: -800 mL      Daily     Daily Weight in k.4 (10 May 2023 07:27)    PHYSICAL EXAMINATION:  HEAD:  Atraumatic, Normocephalic  EYES: EOMI, PERRLA, conjunctiva and sclera clear  MOUTH: no oral thrush.  NECK: Supple, No JVD  CHEST/LUNG: Clear to auscultation bilaterally; No wheeze  HEART: Regular rate and rhythm; No murmurs, rubs, or gallops  ABDOMEN: Soft, Nontender, Nondistended; Bowel sounds present  EXTREMITIES:  2+ Peripheral Pulses, No clubbing, cyanosis, or edema  NEUROLOGY: AAOx1, RUE and LE 5/5 strength, no deficits on exam   SKIN: No rashes or lesions    LABS:                          13.4   12.72 )-----------( 192      ( 09 May 2023 06:46 )             38.3         138  |  103  |  28<H>  ----------------------------<  94  4.1   |  25  |  0.90    Ca    9.3      09 May 2023 06:46  Phos  2.9     05-09  Mg     2.0     05-           PATIENT: MARY JARVIS, MRN: [id number]    CHIEF COMPLAINT: Patient is a 77y old  Male who presents with a chief complaint of R sided weakness/numbness (09 May 2023 07:18)      INTERVAL HISTORY/OVERNIGHT EVENTS: No overnight events. Patient was seen by psych, re-iterated that he wanted his sister to make decisions for him. Patient denied any complaints this AM, denies lightheadedness, headaches, vision changes, CP, SOB.       MEDICATIONS:  MEDICATIONS  (STANDING):  budesonide  80 MICROgram(s)/formoterol 4.5 MICROgram(s) Inhaler 2 Puff(s) Inhalation two times a day  chlorhexidine 4% Liquid 1 Application(s) Topical daily  dexAMETHasone     Tablet 4 milliGRAM(s) Oral every 12 hours  folic acid 1 milliGRAM(s) Oral daily  levETIRAcetam 500 milliGRAM(s) Oral two times a day  multivitamin 1 Tablet(s) Oral daily  nicotine -   7 mG/24Hr(s) Patch 1 Patch Transdermal daily  pantoprazole    Tablet 40 milliGRAM(s) Oral before breakfast  tiotropium 2.5 MICROgram(s) Inhaler 2 Puff(s) Inhalation daily    MEDICATIONS  (PRN):  acetaminophen     Tablet .. 650 milliGRAM(s) Oral every 6 hours PRN Temp greater or equal to 38C (100.4F), Mild Pain (1 - 3)  aluminum hydroxide/magnesium hydroxide/simethicone Suspension 30 milliLiter(s) Oral every 4 hours PRN Dyspepsia  melatonin 3 milliGRAM(s) Oral at bedtime PRN Insomnia  ondansetron Injectable 4 milliGRAM(s) IV Push every 8 hours PRN Nausea and/or Vomiting      ALLERGIES: Allergies    No Known Allergies    Intolerances        OBJECTIVE:  ICU Vital Signs Last 24 Hrs  T(C): 36.8 (10 May 2023 04:56), Max: 36.8 (09 May 2023 21:15)  T(F): 98.3 (10 May 2023 04:56), Max: 98.3 (10 May 2023 04:56)  HR: 60 (10 May 2023 04:56) (58 - 86)  BP: 107/56 (10 May 2023 04:56) (107/56 - 125/71)  BP(mean): --  ABP: --  ABP(mean): --  RR: 18 (10 May 2023 04:56) (18 - 18)  SpO2: 96% (10 May 2023 04:56) (95% - 97%)    O2 Parameters below as of 10 May 2023 04:56  Patient On (Oxygen Delivery Method): room air            Adult Advanced Hemodynamics Last 24 Hrs  CVP(mm Hg): --  CVP(cm H2O): --  CO: --  CI: --  PA: --  PA(mean): --  PCWP: --  SVR: --  SVRI: --  PVR: --  PVRI: --  CAPILLARY BLOOD GLUCOSE      POCT Blood Glucose.: 137 mg/dL (10 May 2023 05:58)  POCT Blood Glucose.: 110 mg/dL (09 May 2023 17:44)    CAPILLARY BLOOD GLUCOSE      POCT Blood Glucose.: 137 mg/dL (10 May 2023 05:58)    I&O's Summary    09 May 2023 07:01  -  10 May 2023 07:00  --------------------------------------------------------  IN: 400 mL / OUT: 1200 mL / NET: -800 mL      Daily     Daily Weight in k.4 (10 May 2023 07:27)    PHYSICAL EXAMINATION:  HEAD:  Atraumatic, Normocephalic  EYES: EOMI, PERRLA, conjunctiva and sclera clear  MOUTH: no oral thrush.  NECK: Supple, No JVD  CHEST/LUNG: Clear to auscultation bilaterally; No wheeze  HEART: Regular rate and rhythm; No murmurs, rubs, or gallops  ABDOMEN: Soft, Nontender, Nondistended; Bowel sounds present  EXTREMITIES:  2+ Peripheral Pulses, No clubbing, cyanosis, or edema  NEUROLOGY: AAOx1, RUE and LE 5/5 strength, no deficits on exam.  SKIN: No rashes or lesions    LABS:                          13.4   12.72 )-----------( 192      ( 09 May 2023 06:46 )             38.3         138  |  103  |  28<H>  ----------------------------<  94  4.1   |  25  |  0.90    Ca    9.3      09 May 2023 06:46  Phos  2.9     05-09  Mg     2.0     05-09

## 2023-05-10 NOTE — PROGRESS NOTE ADULT - PROBLEM SELECTOR PLAN 4
Seen by palliative care and ethics   HCP is spouse: Zoe, although sister Carli also part of discussion   Surrogate/family want to pursue biopsy for pt for tissue dx  - psych eval for capacity assessment  - assigned his sister as HCP Seen by palliative care and ethics   HCP is spouse: Zoe, although sister Carli also part of discussion   Surrogate/family want to pursue biopsy for pt for tissue dx  - psych eval for capacity assessment  - assigned his sister as HCP - will fill ppw Seen by palliative care and ethics   HCP is spouse: Zoe, although sister Carli also part of discussion   Surrogate/family want to pursue biopsy for pt for tissue dx  - psych eval for capacity assessment  - assigned his sister as HCP - will fill ppw.

## 2023-05-10 NOTE — PROGRESS NOTE ADULT - PROBLEM SELECTOR PLAN 1
R sided weakness/numbness x 2 day, found to have Left parietal mass w/ vasogenic edema  - CTA neck with Left vertebral stenosis but good basilar filling.  - appreciate NSG recs: recommend MRI  MRI head with multiple masses, concerning for mets  CT chest/abd/pelv without signs of other malignancy, does have hilar lymphadenopathy   Ethics consulted for complicated GOC, spouse is the legal decision maker  Biopsy of lymph node EBUS 5/5   Onc consulted, pending results   - f/u biopsy results   - continue decadron 4mg q12hr, taper  - c/w PPI   - continue keppra 500mg BID

## 2023-05-11 NOTE — PROGRESS NOTE ADULT - ASSESSMENT
77M with EtOH use disorder, no known PMHx as hasn't seen doctors in decades, presents with R sided weakness/numbness x 2 day, found to have Left parietal mass w/ vasogenic edema. Was placed on ativan taper for EtOH withdrawal however became very lethargic, was discontinued. Pending biopsy of lymph node, family/surrogate agreed to pursue biopsy s/p EBUS 5/5 - NSCLC, will discuss treatment options with HCP sister.

## 2023-05-11 NOTE — PROGRESS NOTE ADULT - PROBLEM SELECTOR PROBLEM 1
Brain mass

## 2023-05-11 NOTE — CONSULT NOTE ADULT - CONSULT REASON
Endobronchial ultrasound
New likely metastatic brain lesions, c/s for mediastinal/hilar LAD biopsy
Rehabilitation consult
Brain mass
Brain mass with associated mediastinal and hilar adenopathy
NSCLC
77M  EtOH use disorder, no known PMHx as hasn't seen doctors in decades, found to have new brain lesions concern for mets of unknown etiology. Consult for assistance with goals of care (next step if interested in pursuing further workup would be EBUS for tissue sampling to identify source of malignancy) . complicated family dynamic (patient lives alone, has domestic partner, sister, 7 children- per Ethics domestic partner is surrogate).
CNS metastases
To assist in the ethical dilemma of identifying a surrogate for a 77 year old male who was admitted to the hospital and lacks capacity

## 2023-05-11 NOTE — DISCHARGE NOTE NURSING/CASE MANAGEMENT/SOCIAL WORK - PATIENT PORTAL LINK FT
You can access the FollowMyHealth Patient Portal offered by [facility] by registering at the following website: [email or web address]. By joining [facility] FollowMyHealth portal, you will also be able to view your health information using other applications (apps) compatible with our system.

## 2023-05-11 NOTE — PROGRESS NOTE ADULT - PROBLEM SELECTOR PROBLEM 5
Prophylactic measure
None

## 2023-05-11 NOTE — PROGRESS NOTE ADULT - PROBLEM SELECTOR PLAN 5
DVT ppx: SCDs, in setting of brain mass  Dispo: PT recommended HO --> will need long term facility, concern that patient cannot take care of himself   Code: Full DVT ppx: SCDs, in setting of brain mass  Dispo: PT recommended HO --> will need long term facility, concern that patient cannot take care of himself   Code: Full code

## 2023-05-11 NOTE — PROGRESS NOTE ADULT - ATTENDING SUPERVISION STATEMENT
Fellow
Resident

## 2023-05-11 NOTE — PROGRESS NOTE ADULT - PROBLEM SELECTOR PROBLEM 4
Nicotine dependence
Nicotine dependence
Palliative care encounter
Palliative care encounter
Nicotine dependence
Nicotine dependence
Palliative care encounter
Nicotine dependence
Palliative care encounter
Palliative care encounter
Nicotine dependence
Palliative care encounter
Nicotine dependence
Palliative care encounter

## 2023-05-11 NOTE — PROGRESS NOTE ADULT - PROBLEM SELECTOR PROBLEM 2
Hemiparesis, right
Hemiparesis, right
Severe alcohol use disorder
Hemiparesis, right
Severe alcohol use disorder
Hemiparesis, right
Severe alcohol use disorder
Hemiparesis, right
Advanced care planning/counseling discussion
Hemiparesis, right
Severe alcohol use disorder
Severe alcohol use disorder
Hemiparesis, right
Advanced care planning/counseling discussion
Palliative care encounter

## 2023-05-11 NOTE — PROGRESS NOTE ADULT - PROVIDER SPECIALTY LIST ADULT
Neurosurgery
Pulmonology
Pulmonology
Internal Medicine
Palliative Care
Pulmonology
Internal Medicine
Palliative Care
Internal Medicine
Palliative Care
Internal Medicine

## 2023-05-11 NOTE — PROGRESS NOTE ADULT - NUTRITIONAL ASSESSMENT
This patient has been assessed with a concern for Malnutrition and has been determined to have a diagnosis/diagnoses of Severe protein-calorie malnutrition and Underweight (BMI < 19).    This patient is being managed with:   Diet Regular-  Supplement Feeding Modality:  Oral  Ensure Plus High Protein Cans or Servings Per Day:  1       Frequency:  Two Times a day  Entered: May  1 2023 11:25AM  
This patient has been assessed with a concern for Malnutrition and has been determined to have a diagnosis/diagnoses of Severe protein-calorie malnutrition and Underweight (BMI < 19).    This patient is being managed with:   Diet NPO after Midnight-     NPO Start Date: 04-May-2023   NPO Start Time: 23:59  Except Medications  Entered: May  4 2023  8:55AM    Diet Regular-  Supplement Feeding Modality:  Oral  Ensure Plus High Protein Cans or Servings Per Day:  1       Frequency:  Two Times a day  Entered: May  1 2023 11:25AM  
This patient has been assessed with a concern for Malnutrition and has been determined to have a diagnosis/diagnoses of Severe protein-calorie malnutrition and Underweight (BMI < 19).    This patient is being managed with:   Diet Regular-  Supplement Feeding Modality:  Oral  Ensure Plus High Protein Cans or Servings Per Day:  1       Frequency:  Two Times a day  Entered: May  1 2023 11:25AM  
This patient has been assessed with a concern for Malnutrition and has been determined to have a diagnosis/diagnoses of Severe protein-calorie malnutrition and Underweight (BMI < 19).    This patient is being managed with:   Diet Regular-  Supplement Feeding Modality:  Oral  Ensure Plus High Protein Cans or Servings Per Day:  1       Frequency:  Two Times a day  Entered: May  1 2023 11:25AM

## 2023-05-11 NOTE — DISCHARGE NOTE NURSING/CASE MANAGEMENT/SOCIAL WORK - NSDCPEFALRISK_GEN_ALL_CORE
For information on Fall & Injury Prevention, visit: [email or web address] OR  [email or web address] OR  https://www.cdc.gov/steadi/patient.html

## 2023-05-11 NOTE — PROGRESS NOTE ADULT - TIME BILLING
review of records/results/imaging, pulmonary evaluation and assessment, and discussion with patient and medical team
Total time spent including the following  [x] Physical chart review and documentation   An extensive review of the physical chart, electronic health record, and documentation was conducted to obtain collateral information including but not limited to:   - Current inpatient records (ED, H&P, primary team, and consultants if applicable)   - Inpatient values/results (biomarkers, immunoassays, imaging, and microbiology results)   - Social work assessments   - Current or proposed treatment plans  [x]care coordination:  [x]discussion with the primary team:  [x]discussion with the patient, surrogate decision maker, or family.
Total time spent including the following  [x] Physical chart review and documentation   An extensive review of the physical chart, electronic health record, and documentation was conducted to obtain collateral information including but not limited to:   - Current inpatient records (ED, H&P, primary team, and consultants if applicable)   - Inpatient values/results (biomarkers, immunoassays, imaging, and microbiology results)   - Social work assessments   - Current or proposed treatment plans   - Pharmacotherapy review (including I-STOP if applicable)  [x]care coordination  [x]discussion with the patient, surrogate decision maker, or family
Reviewing pathology report, consultant notes. independently doing physical exam and coordinating care
Total time spent including the following  [x] Physical chart review and documentation   An extensive review of the physical chart, electronic health record, and documentation was conducted to obtain collateral information including but not limited to:   - Current inpatient records (ED, H&P, primary team, and consultants if applicable)   - Inpatient values/results (biomarkers, immunoassays, imaging, and microbiology results)   - Social work assessments   - Current or proposed treatment plans  [x]care coordination:  [x]discussion with the primary team:  [x]discussion with the patient, surrogate decision maker, or family:

## 2023-05-11 NOTE — CONSULT NOTE ADULT - CONSULT REQUESTED DATE/TIME
24-Apr-2023 04:30
25-Apr-2023 10:34
11-May-2023 07:42
25-Apr-2023 13:26
11-May-2023 17:27
28-Apr-2023 09:43
05-May-2023 07:24
27-Apr-2023 17:30
28-Apr-2023 12:10

## 2023-05-11 NOTE — CONSULT NOTE ADULT - CONSTITUTIONAL COMMENTS
He is awake but his speech is difficult to understand.  However he does understand that he is cancer in the brain is willing to accept treatment.

## 2023-05-11 NOTE — PROGRESS NOTE ADULT - PROBLEM SELECTOR PLAN 1
R sided weakness/numbness x 2 day, found to have Left parietal mass w/ vasogenic edema  - CTA neck with Left vertebral stenosis but good basilar filling.  - appreciate NSG recs: recommend MRI  MRI head with multiple masses, concerning for mets  CT chest/abd/pelv without signs of other malignancy, does have hilar lymphadenopathy   Ethics consulted for complicated GOC, spouse is the legal decision maker  Biopsy of lymph node EBUS 5/5 with NSLCL   Onc consulted, pending rplans  - continue decadron 4mg q12hr, taper  - c/w PPI   - continue keppra 500mg BID R sided weakness/numbness x 2 day, found to have Left parietal mass w/ vasogenic edema  - CTA neck with Left vertebral stenosis but good basilar filling.  - appreciate NSG recs: recommend MRI  MRI head with multiple masses, concerning for mets  CT chest/abd/pelv without signs of other malignancy, does have hilar lymphadenopathy   Ethics consulted for complicated GOC, spouse is the legal decision maker  Biopsy of lymph node EBUS 5/5 with NSLCL   Onc consulted, chemo plans pending further testing   - rad onc consulted for possible inpatient radiation   - continue decadron 2mg q8 hr , taper  - c/w PPI   - continue keppra 500mg BID

## 2023-05-11 NOTE — CONSULT NOTE ADULT - SUBJECTIVE AND OBJECTIVE BOX
77 year old with history of alcohol abuse who developed right sided weakness.  At one point he was not thriving, however he has improvand now is being considered for brain irradiation.  He was being prepared for discharge, but the family is willing to considered transfer to [facility] for whole brain irradiation.  The patient likewise is willing to accept teratment.   77 year old with history of alcohol abuse who developed right sided weakness.  At one point he was not thriving, however he has improvand now is being considered for brain irradiation.  He was being prepared for discharge, but the family is willing to considered transfer to [facility] for whole brain irradiation.  The patient likewise is willing to accept treatment.         Per NSG:   · Note Type    	Event Note  Neurosurgery    The neurosurgical plan will be for frameless hyperfractionated treatment - likely 3 fractions. This radiosurgical treatment will have to be done as outpatient so patient will need to be discharged after the lymph node biopsy.    < from: MR Brain Stereotactic w/wo IV Cont (04.24.23 @ 14:49) >  FINDINGS:   CT head dated 04/23/2023 available for review.    Patient motion degrades image quality.    The brain demonstrates multiple enhancing masses within the brain, the   largest ring enhancing mass measuring 2.9 x 2.7 x 3.0 cm in the LEFT   parietal lobe with moderate surrounding edema which demonstrates mass   effect on the posterior horn of the LEFT lateral ventricle. A 1.4 cm   lesion is medially with mild surrounding edema and a 9 mm ring-enhancing   lesion is seen in the RIGHT occipital cortex with minimal edema. 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 No acute cerebral cortical infarct is found.   No   intracranial hemorrhage is recognized.  No mass effect is found in the   brain.    The ventricles, sulci and basal cisterns appear otherwise unremarkable.    The vertebral and internal carotid arteries demonstrate expected flow   voids indicating their patency.    The orbits are unremarkable.  The paranasal sinuses are clear.  The nasal   cavity appears intact.  The nasopharynx is symmetric.  The central skull   base and petrous temporal bones are intact.  The calvarium appears   unremarkable.      IMPRESSION:  Multiple enhancing masses within the brain, the largest ring   enhancing mass measuring 2.9 x 2.7 x 3.0 cm in the LEFT parietal lobe   with moderate surrounding edema which demonstrates mass effect on the   posterior horn of the LEFT lateral ventricle. A 1.4 cm lesion is medially   with mild surrounding edema and a 9 mm ring-enhancing lesion is seen in   the RIGHT occipital cortex with minimal edema. 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      a/p:   PER NSG, patient is appropriate for discharge after lymph node biopsy for frameless [place] brain radiosurgery;  would recommend Dr. Millard at [facility].   Heme onc suggested barriers to outpatient RT.  If amenable and able to d/c home, [place] radiosurgery is the agreed upon  plan with NSG.  If, the patient cannot be discharged, which is not the decision of [facility], then we would have to consider the transfer  of care to [facility] where [place] radiosurgery, cannot be done, and instead, a WBRT course would be pursued.   77 year old with history of alcohol abuse who developed right sided weakness.  At one point he was not thriving, however he has improvand now is being considered for brain irradiation.  He was being prepared for discharge, but the family is willing to considered transfer to [facility] for whole brain irradiation.  The patient likewise is willing to accept treatment.         Per NSG:   · Note Type    	Event Note  Neurosurgery    The neurosurgical plan will be for frameless hyperfractionated treatment - likely 3 fractions. This radiosurgical treatment will have to be done as outpatient so patient will need to be discharged after the lymph node biopsy.    < from: MR Brain Stereotactic w/wo IV Cont (04.24.23 @ 14:49) >  FINDINGS:   CT head dated 04/23/2023 available for review.    Patient motion degrades image quality.    The brain demonstrates multiple enhancing masses within the brain, the   largest ring enhancing mass measuring 2.9 x 2.7 x 3.0 cm in the LEFT   parietal lobe with moderate surrounding edema which demonstrates mass   effect on the posterior horn of the LEFT lateral ventricle. A 1.4 cm   lesion is medially with mild surrounding edema and a 9 mm ring-enhancing   lesion is seen in the RIGHT occipital cortex with minimal edema. 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 No acute cerebral cortical infarct is found.   No   intracranial hemorrhage is recognized.  No mass effect is found in the   brain.    The ventricles, sulci and basal cisterns appear otherwise unremarkable.    The vertebral and internal carotid arteries demonstrate expected flow   voids indicating their patency.    The orbits are unremarkable.  The paranasal sinuses are clear.  The nasal   cavity appears intact.  The nasopharynx is symmetric.  The central skull   base and petrous temporal bones are intact.  The calvarium appears   unremarkable.      IMPRESSION:  Multiple enhancing masses within the brain, the largest ring   enhancing mass measuring 2.9 x 2.7 x 3.0 cm in the LEFT parietal lobe   with moderate surrounding edema which demonstrates mass effect on the   posterior horn of the LEFT lateral ventricle. A 1.4 cm lesion is medially   with mild surrounding edema and a 9 mm ring-enhancing lesion is seen in   the RIGHT occipital cortex with minimal edema. 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      a/p:   PER NSG, patient is appropriate for discharge after lymph node biopsy for frameless brain radiosurgery;  would recommend Dr. Millard at [facility].   Heme onc suggested barriers to outpatient RT.  If amenable and able to d/c home, radiosurgery is the agreed upon  plan with NSG.  If, the patient cannot be discharged, which is not the decision of [facility], then we would have to consider the transfer  of care to [facility] where SRS cannot be done, and instead, a WBRT course would be pursued.   77 year old with history of alcohol abuse who developed right sided weakness.  At one point he was not thriving, however he has improvand now is being considered for brain irradiation.  He was being prepared for discharge, but the family is willing to considered transfer to [facility] for whole brain irradiation.  The patient likewise is willing to accept treatment.         Per NSG:   · Note Type    	Event Note  Neurosurgery    The neurosurgical plan will be for frameless hyperfractionated treatment - likely 3 fractions. This radiosurgical treatment will have to be done as outpatient so patient will need to be discharged after the lymph node biopsy.    s/p LN bx- metastatic NSCLC resulted.     < from: MR Brain Stereotactic w/wo IV Cont (04.24.23 @ 14:49) >  FINDINGS:   CT head dated 04/23/2023 available for review.    Patient motion degrades image quality.    The brain demonstrates multiple enhancing masses within the brain, the   largest ring enhancing mass measuring 2.9 x 2.7 x 3.0 cm in the LEFT   parietal lobe with moderate surrounding edema which demonstrates mass   effect on the posterior horn of the LEFT lateral ventricle. A 1.4 cm   lesion is medially with mild surrounding edema and a 9 mm ring-enhancing   lesion is seen in the RIGHT occipital cortex with minimal edema. 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 No acute cerebral cortical infarct is found.   No   intracranial hemorrhage is recognized.  No mass effect is found in the   brain.    The ventricles, sulci and basal cisterns appear otherwise unremarkable.    The vertebral and internal carotid arteries demonstrate expected flow   voids indicating their patency.    The orbits are unremarkable.  The paranasal sinuses are clear.  The nasal   cavity appears intact.  The nasopharynx is symmetric.  The central skull   base and petrous temporal bones are intact.  The calvarium appears   unremarkable.      IMPRESSION:  Multiple enhancing masses within the brain, the largest ring   enhancing mass measuring 2.9 x 2.7 x 3.0 cm in the LEFT parietal lobe   with moderate surrounding edema which demonstrates mass effect on the   posterior horn of the LEFT lateral ventricle. A 1.4 cm lesion is medially   with mild surrounding edema and a 9 mm ring-enhancing lesion is seen in   the RIGHT occipital cortex with minimal edema. 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      a/p:   PER NSG, patient is appropriate for discharge after lymph node biopsy now done, shows metastatic NSCLC,  for frameless brain radiosurgery;  would recommend Dr. Millard at [facility].   Heme onc suggested barriers to outpatient RT.  If amenable and able to d/c home, radiosurgery is the agreed upon  plan with NSG.  If, the [facility] cannot be discharged, [facility], then we would have to consider the transfer  of care to [facility] where SRS cannot be done, and instead, a WBRT course would be pursued.

## 2023-05-11 NOTE — CONSULT NOTE ADULT - SUBJECTIVE AND OBJECTIVE BOX
Hematology Oncology Consult Note    HPI:  77M with EtOH use disorder, no known PMHx as hasn't seen doctors in decades, presents with R sided weakness/numbness x 2 days. He states that he woke up woke up 2 days ago and did not have sensation in his right arm or leg, states he fell over.  Did not want to seek medical attention at that time. He states that right leg function has gradually improved but right arm function is still weak. Since the symptoms didn't get better, he came to ED for further evaluations. Denies head trauma, LOC, fever/chills, syncope.    He states that he has been drinking a bottle of rum everyday and smokes 4-5 cigarettes/day forever. Denies hx of withdrawal seizures, hallucinations or DTs. No hospitalizations for EtOH withdrawals as per patient. He lives with his sister and is not interested in seeing doctor as outpatient.    In ED,  CT shows Left parietal mass w/ vasogenic edema, no sig MLS. CTA w/ Left vertebral stenosis but good basilar filling (24 Apr 2023 12:41)      PAST MEDICAL & SURGICAL HISTORY:  No significant past surgical history          FAMILY HISTORY:  No pertinent family history in first degree relatives        MEDICATIONS  (STANDING):  budesonide  80 MICROgram(s)/formoterol 4.5 MICROgram(s) Inhaler 2 Puff(s) Inhalation two times a day  chlorhexidine 4% Liquid 1 Application(s) Topical daily  dexAMETHasone     Tablet 4 milliGRAM(s) Oral every 12 hours  folic acid 1 milliGRAM(s) Oral daily  levETIRAcetam 500 milliGRAM(s) Oral two times a day  multivitamin 1 Tablet(s) Oral daily  nicotine -   7 mG/24Hr(s) Patch 1 Patch Transdermal daily  pantoprazole    Tablet 40 milliGRAM(s) Oral before breakfast  tiotropium 2.5 MICROgram(s) Inhaler 2 Puff(s) Inhalation daily    MEDICATIONS  (PRN):  acetaminophen     Tablet .. 650 milliGRAM(s) Oral every 6 hours PRN Temp greater or equal to 38C (100.4F), Mild Pain (1 - 3)  aluminum hydroxide/magnesium hydroxide/simethicone Suspension 30 milliLiter(s) Oral every 4 hours PRN Dyspepsia  melatonin 3 milliGRAM(s) Oral at bedtime PRN Insomnia  ondansetron Injectable 4 milliGRAM(s) IV Push every 8 hours PRN Nausea and/or Vomiting      Allergies    No Known Allergies    Intolerances        SOCIAL HISTORY: No EtOH, no tobacco        T(F): 98.2 (05-11-23 @ 05:00), Max: 98.2 (05-10-23 @ 12:20)  HR: 61 (05-11-23 @ 05:00)  BP: 117/65 (05-11-23 @ 05:00)  RR: 18 (05-11-23 @ 05:00)  SpO2: 96% (05-11-23 @ 05:00)  Wt(kg): --    GENERAL: NAD, well-developed  HEAD:  Atraumatic, Normocephalic  EYES: EOMI, PERRLA, conjunctiva and sclera clear  NECK: Supple, No JVD  CHEST/LUNG: Clear to auscultation bilaterally; No wheeze  HEART: Regular rate and rhythm; No murmurs, rubs, or gallops  ABDOMEN: Soft, Nontender, Nondistended; Bowel sounds present  EXTREMITIES:  2+ Peripheral Pulses, No clubbing, cyanosis, or edema  NEUROLOGY: non-focal  SKIN: No rashes or lesions

## 2023-05-11 NOTE — CONSULT NOTE ADULT - ATTENDING COMMENTS
77M with EtOH use disorder, no known PMHx as hasn't seen doctors in decades, presents with R sided weakness/numbness x 2 day, found to have left parietal mass w/ vasogenic edema. Patient was admitted with etoh withdrawl. Awaiting psychiatry consult to deem medical decision making capacity. Patient is able to discuss that he has cancer and wishes to start treatment. If patient/family wants to pursue treatment, would consider rad onc consult and inpatient RT at [facility] prior to HO. Systemic therapy as outpatient dependent on performance status.

## 2023-05-11 NOTE — PROGRESS NOTE ADULT - PROBLEM SELECTOR PROBLEM 3
Nicotine dependence
Severe alcohol use disorder
Nicotine dependence
Severe alcohol use disorder
Nicotine dependence
Nicotine dependence
Severe alcohol use disorder
Severe alcohol use disorder
Nicotine dependence
Nicotine dependence
Palliative care encounter
Severe alcohol use disorder
Nicotine dependence
Nicotine dependence
Palliative care encounter

## 2023-05-11 NOTE — CONSULT NOTE ADULT - PROVIDER SPECIALTY LIST ADULT
Palliative Care
Ethics
Rehab Medicine
Heme/Onc
Intervent Pulmonology
Intervent Radiology
Neurosurgery
Rad Onc
Pulmonology

## 2023-05-11 NOTE — CONSULT NOTE ADULT - TIME BILLING
Plan:  Discussed with the team we will accept for transfer.      Fell free to discuss with Quentin Florez at [facility].  Maksim Elias, DO

## 2023-05-11 NOTE — PROGRESS NOTE ADULT - PROBLEM SELECTOR PLAN 2
Daily consumption of a bottle of rum, unclear when last drink was - pt does not remember   No hx of withdrawal seizures, hallucinations, DTs or hospitalizations  S/p ativan taper overnight, appeared lethargic this AM, d/c   - discontinued ativan for lethargy     Outside withdrawal window
Daily consumption of a bottle of rum, unclear when last drink was - pt does not remember   No hx of withdrawal seizures, hallucinations, DTs or hospitalizations  S/p ativan taper overnight, appeared lethargic this AM, d/c   - discontinued ativan for lethargy     Outside withdrawal window
likely in setting of L parietal mass with edema  - management as above  - serial neuro exams
likely in setting of L parietal mass with edema  - management as above  - serial neuro exams
Daily consumption of a bottle of rum, unclear when last drink was - pt does not remember   No hx of withdrawal seizures, hallucinations, DTs or hospitalizations  S/p ativan taper overnight, appeared lethargic this AM, d/c   - discontinued ativan for lethargy     Outside withdrawal window
likely in setting of L parietal mass with edema  - management as above  - serial neuro exams
Daily consumption of a bottle of rum, unclear when last drink was - pt does not remember   No hx of withdrawal seizures, hallucinations, DTs or hospitalizations  S/p ativan taper overnight, appeared lethargic this AM, d/c   - discontinued ativan for lethargy     Outside withdrawal window
likely in setting of L parietal mass with edema  - management as above  - serial neuro exams
For details, please see my [facility] note above. However, in summary, either due to the patient's personality or medical literacy or both, assessing capacity becomes challenging. At this point, I will recommend psych eval to determine capacity for assigning a HCP and for defining code status (mainly CPR and intubation).
likely in setting of L parietal mass with edema  - management as above  - serial neuro exams
Daily consumption of a bottle of rum, unclear when last drink was - pt does not remember   No hx of withdrawal seizures, hallucinations, DTs or hospitalizations  S/p ativan taper overnight, appeared lethargic this AM, d/c   - discontinued ativan for lethargy     Outside withdrawal window
Will continue to f/u for ACP.   I will be on admin duties until 5/4. Meanwhile, If any acute issues require assistance from the [place] team, please call pager [phone], or if there is not an answer, the PCU at [phone].         Vaughn Flanagan MD  Associate Chief Geriatrics and Palliative Care ([facility]) [facility] Director [place] Consult Service   , [employer] [facility]      Please contact me via Teams from Monday through Friday between 9am-5pm. If not answering, please call the palliative care pager [phone]    After 5pm and on weekends, please see the contact information below:    In the event of newly developing, evolving, or worsening symptoms, please contact the Palliative Medicine team via pager (if the patient is at [facility] #[phone] or if the patient is at [facility] #[phone]) The Geriatric and Palliative Medicine service has coverage 24 hours a day/ 7 days a week to provide medical recommendations regarding symptom management needs via telephone
Daily consumption of a bottle of rum, unclear when last drink was - pt does not remember   No hx of withdrawal seizures, hallucinations, DTs or hospitalizations  S/p ativan taper overnight, appeared lethargic this AM, d/c   - discontinued ativan for lethargy     Outside withdrawal window
See [facility] note above.

## 2023-05-11 NOTE — PROGRESS NOTE ADULT - REASON FOR ADMISSION
R sided weakness/numbness
R sided weakness/numbness  Brain mets
R sided weakness/numbness

## 2023-05-11 NOTE — CHART NOTE - NSCHARTNOTESELECT_GEN_ALL_CORE
GAP 
Interventional Pulm
Neurosurgery/Event Note
Pulmonary/Event Note
Event Note
Neurosurgery/Event Note
Oncology
Transfer to [facility]

## 2023-05-11 NOTE — CONSULT NOTE ADULT - REASON FOR ADMISSION
R sided weakness/numbness
Right sided weakness/numbness
R sided weakness/numbness
R sided weakness/numbness

## 2023-05-11 NOTE — CONSULT NOTE ADULT - ASSESSMENT
77M with EtOH use disorder, no known PMHx as hasn't seen doctors in decades, presents with R sided weakness/numbness x 2 day, found to have left parietal mass w/ vasogenic edema. Was placed on ativan taper for EtOH withdrawal however became very lethargic, was discontinued.    # Metastatic NSCLC   - CT Head at admission: 2.4 x 2.0 x 2.6 cm left frontal/parietal mass with vasogenic edema   - MRI confirming multiple ring enhancing lesions in b/l frontal, left temporal lobes.   - CT C/A/P showing: clusters of nodules in the LLL, mediastinal LAD, paratracheal LAD, right hilar LAD.   - Neurosurgery consulted: no intervention planned inpatient   - started on decadron; now being tapered, currently on 2 mg q 8 hours   - LN biopsy taken on 5/5 consistent with NSCLC; molecular pending   - Please consult Rad-Onc   - Patient appears to have good performance status, walks around the room, not on supplemental 02. Patient can be a candidate for DMT. Patient reports understanding of his diagnosis and need for treatment, although specifics were not discussed. Social situation has been taken into account and it is not a reason to not offer treatment. Will discuss with himself and HCP, sister.     Patience Desouza MD  PGY5 heme onc fellow. 77M with EtOH use disorder, no known PMHx as hasn't seen doctors in decades, presents with R sided weakness/numbness x 2 day, found to have left parietal mass w/ vasogenic edema. Was placed on ativan taper for EtOH withdrawal however became very lethargic, was discontinued.    # Metastatic NSCLC   - CT Head at admission: 2.4 x 2.0 x 2.6 cm left frontal/parietal mass with vasogenic edema   - MRI confirming multiple ring enhancing lesions in b/l frontal, left temporal lobes.   - CT C/A/P showing: clusters of nodules in the LLL, mediastinal LAD, paratracheal LAD, right hilar LAD.   - Neurosurgery consulted: no intervention planned inpatient   - started on decadron; now being tapered, currently on 2 mg q 8 hours   - LN biopsy taken on 5/5 consistent with NSCLC; molecular pending   - g=Given social situation, can benefit from inpatient radiation treatment if Rad onc amenable. Please consult Rad-Onc:   - Patient appears to have good performance status, walks around the room, not on supplemental 02. Patient can be a candidate for DMT. Patient reports understanding of his diagnosis and need for treatment, although specifics were not discussed. Social situation has been taken into account and it is not a reason to not offer treatment. Discussed with Sister, HCP and patient himself. They understand the different mode of treatments to include radiation, chemotherapy/immunotherapy. Patient reports willingness to get chemotherapy and that this requires social support. Explained the possible need for HO vs NH in order to have that support to undergo treatment. Per sister (HCP), patient has been accepted at two different facilities in [place] and that herself lives in [place]. In this case, it may be beneficial for family and patient to have care arranged in a center closer to them.     Patience Desouza MD  PGY5 heme onc fellow.

## 2023-05-11 NOTE — PROGRESS NOTE ADULT - SUBJECTIVE AND OBJECTIVE BOX
PATIENT: MARY JARVIS, MRN: [id number]    CHIEF COMPLAINT: Patient is a 77y old  Male who presents with a chief complaint of R sided weakness/numbness (11 May 2023 07:41)      INTERVAL HISTORY/OVERNIGHT EVENTS: No overnight events. Patient was told results of his biopsy, reports that it "sounds serious" however forgot this morning and had to re-iterate. Patient knows he is in the hospital but reports that the only reason was because he had a fall and was drinking. Denies chest pain, SOB, lightheadedness, weakness or other complaints.     MEDICATIONS:  MEDICATIONS  (STANDING):  budesonide  80 MICROgram(s)/formoterol 4.5 MICROgram(s) Inhaler 2 Puff(s) Inhalation two times a day  chlorhexidine 4% Liquid 1 Application(s) Topical daily  dexAMETHasone     Tablet 4 milliGRAM(s) Oral every 12 hours  folic acid 1 milliGRAM(s) Oral daily  levETIRAcetam 500 milliGRAM(s) Oral two times a day  multivitamin 1 Tablet(s) Oral daily  nicotine -   7 mG/24Hr(s) Patch 1 Patch Transdermal daily  pantoprazole    Tablet 40 milliGRAM(s) Oral before breakfast  tiotropium 2.5 MICROgram(s) Inhaler 2 Puff(s) Inhalation daily    MEDICATIONS  (PRN):  acetaminophen     Tablet .. 650 milliGRAM(s) Oral every 6 hours PRN Temp greater or equal to 38C (100.4F), Mild Pain (1 - 3)  aluminum hydroxide/magnesium hydroxide/simethicone Suspension 30 milliLiter(s) Oral every 4 hours PRN Dyspepsia  melatonin 3 milliGRAM(s) Oral at bedtime PRN Insomnia  ondansetron Injectable 4 milliGRAM(s) IV Push every 8 hours PRN Nausea and/or Vomiting      ALLERGIES: Allergies    No Known Allergies    Intolerances        OBJECTIVE:  ICU Vital Signs Last 24 Hrs  T(C): 36.8 (11 May 2023 05:00), Max: 36.8 (10 May 2023 12:20)  T(F): 98.2 (11 May 2023 05:00), Max: 98.2 (10 May 2023 12:20)  HR: 61 (11 May 2023 05:00) (61 - 68)  BP: 117/65 (11 May 2023 05:00) (107/61 - 117/65)  BP(mean): --  ABP: --  ABP(mean): --  RR: 18 (11 May 2023 05:00) (18 - 18)  SpO2: 96% (11 May 2023 05:00) (96% - 97%)    O2 Parameters below as of 11 May 2023 05:00  Patient On (Oxygen Delivery Method): room air            Adult Advanced Hemodynamics Last 24 Hrs  CVP(mm Hg): --  CVP(cm H2O): --  CO: --  CI: --  PA: --  PA(mean): --  PCWP: --  SVR: --  SVRI: --  PVR: --  PVRI: --  CAPILLARY BLOOD GLUCOSE      POCT Blood Glucose.: 113 mg/dL (11 May 2023 05:44)  POCT Blood Glucose.: 100 mg/dL (10 May 2023 17:30)    CAPILLARY BLOOD GLUCOSE      POCT Blood Glucose.: 113 mg/dL (11 May 2023 05:44)    I&O's Summary    10 May 2023 07:01  -  11 May 2023 07:00  --------------------------------------------------------  IN: 300 mL / OUT: 800 mL / NET: -500 mL      Daily     Daily     PHYSICAL EXAMINATION:  HEAD:  Atraumatic, Normocephalic  EYES: EOMI, PERRLA, conjunctiva and sclera clear  MOUTH: no oral thrush.  NECK: Supple, No JVD  CHEST/LUNG: Clear to auscultation bilaterally; No wheeze  HEART: Regular rate and rhythm; No murmurs, rubs, or gallops  ABDOMEN: Soft, Nontender, Nondistended; Bowel sounds present  EXTREMITIES:  2+ Peripheral Pulses, No clubbing, cyanosis, or edema  NEUROLOGY: AAOx1, RUE and LE 5/5 strength equal bilaterally , no deficits on exam.  SKIN: No rashes or lesions    LABS:     PATIENT: MARY JARVIS, MRN: [id number]    CHIEF COMPLAINT: Patient is a 77y old  Male who presents with a chief complaint of R sided weakness/numbness (11 May 2023 07:41)      INTERVAL HISTORY/OVERNIGHT EVENTS: No overnight events. Patient was told results of his biopsy, reports that it "sounds serious" however forgot this morning and had to re-iterate. Patient knows he is in the hospital but reports that the only reason was because he had a fall and was drinking. Denies chest pain, SOB, lightheadedness, weakness or other complaints.     Had conversation in the afternoon with patient and his sister. He was able to state that he has cancer and would like to think about treatment options. Per oncology team, reported that he would want to pursue radiation to the brain.     MEDICATIONS:  MEDICATIONS  (STANDING):  budesonide  80 MICROgram(s)/formoterol 4.5 MICROgram(s) Inhaler 2 Puff(s) Inhalation two times a day  chlorhexidine 4% Liquid 1 Application(s) Topical daily  dexAMETHasone     Tablet 4 milliGRAM(s) Oral every 12 hours  folic acid 1 milliGRAM(s) Oral daily  levETIRAcetam 500 milliGRAM(s) Oral two times a day  multivitamin 1 Tablet(s) Oral daily  nicotine -   7 mG/24Hr(s) Patch 1 Patch Transdermal daily  pantoprazole    Tablet 40 milliGRAM(s) Oral before breakfast  tiotropium 2.5 MICROgram(s) Inhaler 2 Puff(s) Inhalation daily    MEDICATIONS  (PRN):  acetaminophen     Tablet .. 650 milliGRAM(s) Oral every 6 hours PRN Temp greater or equal to 38C (100.4F), Mild Pain (1 - 3)  aluminum hydroxide/magnesium hydroxide/simethicone Suspension 30 milliLiter(s) Oral every 4 hours PRN Dyspepsia  melatonin 3 milliGRAM(s) Oral at bedtime PRN Insomnia  ondansetron Injectable 4 milliGRAM(s) IV Push every 8 hours PRN Nausea and/or Vomiting      ALLERGIES: Allergies    No Known Allergies    Intolerances        OBJECTIVE:  ICU Vital Signs Last 24 Hrs  T(C): 36.8 (11 May 2023 05:00), Max: 36.8 (10 May 2023 12:20)  T(F): 98.2 (11 May 2023 05:00), Max: 98.2 (10 May 2023 12:20)  HR: 61 (11 May 2023 05:00) (61 - 68)  BP: 117/65 (11 May 2023 05:00) (107/61 - 117/65)  BP(mean): --  ABP: --  ABP(mean): --  RR: 18 (11 May 2023 05:00) (18 - 18)  SpO2: 96% (11 May 2023 05:00) (96% - 97%)    O2 Parameters below as of 11 May 2023 05:00  Patient On (Oxygen Delivery Method): room air            Adult Advanced Hemodynamics Last 24 Hrs  CVP(mm Hg): --  CVP(cm H2O): --  CO: --  CI: --  PA: --  PA(mean): --  PCWP: --  SVR: --  SVRI: --  PVR: --  PVRI: --  CAPILLARY BLOOD GLUCOSE      POCT Blood Glucose.: 113 mg/dL (11 May 2023 05:44)  POCT Blood Glucose.: 100 mg/dL (10 May 2023 17:30)    CAPILLARY BLOOD GLUCOSE      POCT Blood Glucose.: 113 mg/dL (11 May 2023 05:44)    I&O's Summary    10 May 2023 07:01  -  11 May 2023 07:00  --------------------------------------------------------  IN: 300 mL / OUT: 800 mL / NET: -500 mL      Daily     Daily     PHYSICAL EXAMINATION:  HEAD:  Atraumatic, Normocephalic  EYES: EOMI, PERRLA, conjunctiva and sclera clear  MOUTH: no oral thrush.  NECK: Supple, No JVD  CHEST/LUNG: Clear to auscultation bilaterally; No wheeze  HEART: Regular rate and rhythm; No murmurs, rubs, or gallops  ABDOMEN: Soft, Nontender, Nondistended; Bowel sounds present  EXTREMITIES:  2+ Peripheral Pulses, No clubbing, cyanosis, or edema  NEUROLOGY: AAOx1, RUE and LE 5/5 strength equal bilaterally , no deficits on exam.  SKIN: No rashes or lesions    LABS:

## 2023-05-11 NOTE — PROGRESS NOTE ADULT - PROBLEM SELECTOR PLAN 4
Seen by palliative care and ethics   HCP is spouse: Zoe, although sister Carli also part of discussion   Surrogate/family want to pursue biopsy for pt for tissue dx  - psych eval for capacity assessment  - assigned his sister as HCP - will fill ppw.  - will discuss options with sister for ultimate plan

## 2023-05-11 NOTE — CHART NOTE - NSCHARTNOTEFT_GEN_A_CORE
Called transfer center, patient accepted for Level 3 transfer to [facility] for whole brain radiation. Accepting medicine physician Dr. Zak Welch. Transfer paperwork completed. Called transfer center, patient accepted for Level 3 transfer to [facility] for whole brain radiation. Accepting medicine physician Dr. Zak Welch. Transfer paperwork completed. Patient's sister/HCP Michelle was informed, amenable to transfer, questions/concerns addressed.

## 2023-05-12 NOTE — PROGRESS NOTE ADULT - ASSESSMENT
77M with ETOH use disorder and recently NSCLC w/ mets to the brain transferred from OSH for RT 77M with ETOH use disorder and recently NSCLC w/ mets to the brain transferred from OSH for RT. Admitted to medicine for further management while undergoing radiation.

## 2023-05-12 NOTE — PROGRESS NOTE ADULT - PROBLEM SELECTOR PLAN 1
Pt presented to OSH w/ R sided weakness/numbness, found to have Left parietal mass w/ vasogenic edema on CT head. MRI head with multiple masses, concerning for mets. s/p hilar lymph node EBUS 5/5 consistent NSLCL. Oncology at [facility] consulted and plan for chemo pending RT. Transferred to [facility] for whole brain RT  - Consult radonc in AM  - Pt started on decadron at [facility] for vasogenic; currently on 2mg q8 hr, being tapered. c/w at this time; taper per team  - continue keppra 500mg BID Pt presented to OSH w/ R sided weakness/numbness, found to have Left parietal mass w/ vasogenic edema on CT head. MRI head with multiple masses, concerning for mets. s/p hilar lymph node EBUS 5/5 consistent NSLCL. Oncology at [facility] consulted and plan for chemo pending RT. Transferred to [facility] for whole brain RT    - rad onc consulted - will consent HCP  - Pt started on decadron at [facility] for vasogenic; currently on 2mg q8 hr, being tapered. c/w at this time - will reach out to neurosurgery vs rad onc regarding duration of dexamethasone   - continue keppra 500mg BID Pt presented to OSH w/ R sided weakness/numbness, found to have Left parietal mass w/ vasogenic edema on CT head. MRI head with multiple masses, concerning for mets. s/p hilar lymph node EBUS 5/5 consistent NSLCL. Oncology at [facility] consulted and plan for chemo pending RT. Transferred to [facility] for whole brain RT    - rad onc consulted - will consent HCP  - Pt started on decadron at [facility] for vasogenic; currently on 2mg q8 hr, being tapered. c/w at this time over the weekend until radiation therapy starts     - while on radiation therapy, can go down to decadron 2mg BID     - once radiation therapy completed, can do decadron 2mg qd   - continue keppra 500mg BID

## 2023-05-12 NOTE — DIETITIAN INITIAL EVALUATION ADULT - PERTINENT MEDS FT
MEDICATIONS  (STANDING):  budesonide  80 MICROgram(s)/formoterol 4.5 MICROgram(s) Inhaler 2 Puff(s) Inhalation two times a day  dexAMETHasone     Tablet 2 milliGRAM(s) Oral every 8 hours  folic acid 1 milliGRAM(s) Oral daily  levETIRAcetam 500 milliGRAM(s) Oral two times a day  multivitamin 1 Tablet(s) Oral daily  mupirocin 2% Ointment 1 Application(s) Both Nostrils two times a day  nicotine -   7 mG/24Hr(s) Patch 1 Patch Transdermal daily  pantoprazole    Tablet 40 milliGRAM(s) Oral before breakfast  tiotropium 2.5 MICROgram(s) Inhaler 2 Puff(s) Inhalation daily    MEDICATIONS  (PRN):  acetaminophen     Tablet .. 650 milliGRAM(s) Oral every 6 hours PRN Temp greater or equal to 38C (100.4F), Mild Pain (1 - 3)

## 2023-05-12 NOTE — H&P ADULT - PROBLEM SELECTOR PLAN 3
Pt w/ leukocytosis to 12, no active signs of infection on lab work or based on VSS. Leukocystosis i/s/o decadron  - CTM qd

## 2023-05-12 NOTE — PROGRESS NOTE ADULT - SUBJECTIVE AND OBJECTIVE BOX
PROGRESS NOTE:     Patient is a 77y old  Male who presents with a chief complaint of Radiation therapy (12 May 2023 05:37)      INTERVAL HISTORY: NAEO. VSS. ROS negative.    MEDICATIONS  (STANDING):  budesonide  80 MICROgram(s)/formoterol 4.5 MICROgram(s) Inhaler 2 Puff(s) Inhalation two times a day  dexAMETHasone     Tablet 2 milliGRAM(s) Oral every 8 hours  folic acid 1 milliGRAM(s) Oral daily  levETIRAcetam 500 milliGRAM(s) Oral two times a day  multivitamin 1 Tablet(s) Oral daily  mupirocin 2% Ointment 1 Application(s) Both Nostrils two times a day  nicotine -   7 mG/24Hr(s) Patch 1 Patch Transdermal daily  pantoprazole    Tablet 40 milliGRAM(s) Oral before breakfast  tiotropium 2.5 MICROgram(s) Inhaler 2 Puff(s) Inhalation daily    MEDICATIONS  (PRN):  acetaminophen     Tablet .. 650 milliGRAM(s) Oral every 6 hours PRN Temp greater or equal to 38C (100.4F), Mild Pain (1 - 3)      CAPILLARY BLOOD GLUCOSE      POCT Blood Glucose.: 116 mg/dL (12 May 2023 07:35)  POCT Blood Glucose.: 146 mg/dL (11 May 2023 18:08)    I&O's Summary      PHYSICAL EXAM:  Vital Signs Last 24 Hrs  T(C): 36.5 (12 May 2023 05:30), Max: 36.7 (11 May 2023 12:05)  T(F): 97.7 (12 May 2023 05:30), Max: 98.1 (11 May 2023 12:05)  HR: 71 (12 May 2023 05:30) (58 - 72)  BP: 109/71 (12 May 2023 05:30) (109/60 - 133/72)  BP(mean): --  RR: 18 (12 May 2023 05:30) (17 - 18)  SpO2: 98% (12 May 2023 05:30) (96% - 100%)    Parameters below as of 12 May 2023 05:30  Patient On (Oxygen Delivery Method): room air        CONSTITUTIONAL: NAD, well-developed  HEENT:   RESPIRATORY: Normal respiratory effort; lungs are clear to auscultation bilaterally  CARDIOVASCULAR: Regular rate and rhythm, normal S1 and S2, no murmur/rub/gallop; No lower extremity edema; Peripheral pulses are 2+ bilaterally  ABDOMEN: Nontender to palpation, normoactive bowel sounds, no rebound/guarding; No hepatosplenomegaly  MUSCULOSKELETAL: no clubbing or cyanosis of digits; no joint swelling or tenderness to palpation  PSYCH: A+O to person, place, and time; affect appropriate    LABS:                        13.7   11.34 )-----------( 178      ( 12 May 2023 05:15 )             37.0                       RADIOLOGY:        ******************************  Authored By: Vesta Duarte MD PGY1  Internal Medicine  MS Teams  ******************************   PROGRESS NOTE:     Patient is a 77y old  Male who presents with a chief complaint of Radiation therapy (12 May 2023 05:37)      INTERVAL HISTORY: NAEO. VSS. ROS negative. AOx1-2 on exam this morning and pleasant. Reached out to rad-onc this AM.     MEDICATIONS  (STANDING):  budesonide  80 MICROgram(s)/formoterol 4.5 MICROgram(s) Inhaler 2 Puff(s) Inhalation two times a day  dexAMETHasone     Tablet 2 milliGRAM(s) Oral every 8 hours  folic acid 1 milliGRAM(s) Oral daily  levETIRAcetam 500 milliGRAM(s) Oral two times a day  multivitamin 1 Tablet(s) Oral daily  mupirocin 2% Ointment 1 Application(s) Both Nostrils two times a day  nicotine -   7 mG/24Hr(s) Patch 1 Patch Transdermal daily  pantoprazole    Tablet 40 milliGRAM(s) Oral before breakfast  tiotropium 2.5 MICROgram(s) Inhaler 2 Puff(s) Inhalation daily    MEDICATIONS  (PRN):  acetaminophen     Tablet .. 650 milliGRAM(s) Oral every 6 hours PRN Temp greater or equal to 38C (100.4F), Mild Pain (1 - 3)      CAPILLARY BLOOD GLUCOSE      POCT Blood Glucose.: 116 mg/dL (12 May 2023 07:35)  POCT Blood Glucose.: 146 mg/dL (11 May 2023 18:08)    I&O's Summary      PHYSICAL EXAM:  Vital Signs Last 24 Hrs  T(C): 36.5 (12 May 2023 05:30), Max: 36.7 (11 May 2023 12:05)  T(F): 97.7 (12 May 2023 05:30), Max: 98.1 (11 May 2023 12:05)  HR: 71 (12 May 2023 05:30) (58 - 72)  BP: 109/71 (12 May 2023 05:30) (109/60 - 133/72)  BP(mean): --  RR: 18 (12 May 2023 05:30) (17 - 18)  SpO2: 98% (12 May 2023 05:30) (96% - 100%)    Parameters below as of 12 May 2023 05:30  Patient On (Oxygen Delivery Method): room air        CONSTITUTIONAL: NAD, well-developed  HEENT: on RA   RESPIRATORY: Normal respiratory effort; lungs are clear to auscultation bilaterally  CARDIOVASCULAR: Regular rate and rhythm, normal S1 and S2, no murmur/rub/gallop; No lower extremity edema; Peripheral pulses are 2+ bilaterally  ABDOMEN: Nontender to palpation, normoactive bowel sounds, no rebound/guarding; No hepatosplenomegaly  MUSCULOSKELETAL: no clubbing or cyanosis of digits; no joint swelling or tenderness to palpation  NEURO: CN2-12 intact; b/l UE 5/5 strength     LABS:                        13.7   11.34 )-----------( 178      ( 12 May 2023 05:15 )             37.0        ******************************  Authored By: Vesta Duarte MD PGY1  Internal Medicine  MS Teams  ******************************

## 2023-05-12 NOTE — DISCHARGE NOTE PROVIDER - DETAILS OF MALNUTRITION DIAGNOSIS/DIAGNOSES
This patient has been assessed with a concern for Malnutrition and was treated during this hospitalization for the following Nutrition diagnosis/diagnoses:     -  05/12/2023: Severe protein-calorie malnutrition

## 2023-05-12 NOTE — DISCHARGE NOTE PROVIDER - NSDCFUADDAPPT_GEN_ALL_CORE_FT
APPTS ARE READY TO BE MADE: [ x] YES    Best Family or Patient Contact (if needed):    Additional Information about above appointments (if needed):    1:   2:   3:     Other comments or requests:    APPTS ARE READY TO BE MADE: [ x] YES    Best Family or Patient Contact (if needed):    Additional Information about above appointments (if needed):    1:   2:   3:     Other comments or requests:     3 attempts were made to reach patient, which have been unsuccessful. 3 Voicemails have been left on 5/19, 5/22, and 5/23. Will await a call back from patient to coordinate follow up care.

## 2023-05-12 NOTE — PROGRESS NOTE ADULT - PROBLEM SELECTOR PLAN 2
CT A/P w Emphysema. Patient likely with obstructive lung disease.   - Should have outpatient PFTs  - C/w Symbicort BID and Spiriva given suspicion for obstructive lung disease  - Incentive spirometer  - Maintain O2 sat > 88%. Check ambulatory O2 saturation prior to discharge to determine if he requires home O2. CT A/P w Emphysema. Patient likely with obstructive lung disease.     - Should have outpatient PFTs  - C/w Symbicort BID and Spiriva given suspicion for obstructive lung disease  - Incentive spirometer  - Maintain O2 sat > 88%. Check ambulatory O2 saturation prior to discharge to determine if he requires home O2.

## 2023-05-12 NOTE — DIETITIAN INITIAL EVALUATION ADULT - PROBLEM SELECTOR PLAN 1
Pt presented to OSH w/ R sided weakness/numbness, found to have Left parietal mass w/ vasogenic edema on CT head. MRI head with multiple masses, concerning for mets. s/p hilar lymph node EBUS 5/5 consistent NSLCL. Oncology at [facility] consulted and plan for chemo pending RT. Transferred to [facility] for whole brain RT  - Consult radonc in AM  - Pt started on decadron at [facility] for vasogenic; currently on 2mg q8 hr, being tapered. c/w at this time; taper per team  - continue keppra 500mg BID

## 2023-05-12 NOTE — PROGRESS NOTE ADULT - ASSESSMENT
77M with EtOH use disorder, no known PMHx as hasn't seen doctors in decades, presents with R sided weakness/numbness x 2 day, found to have left parietal mass w/ vasogenic edema. Was placed on ativan taper for EtOH withdrawal however became very lethargic, was discontinued.    # Metastatic NSCLC   - CT Head at admission: 2.4 x 2.0 x 2.6 cm left frontal/parietal mass with vasogenic edema   - MRI confirming multiple ring enhancing lesions in b/l frontal, left temporal lobes.   - CT C/A/P showing: clusters of nodules in the LLL, mediastinal LAD, paratracheal LAD, right hilar LAD.   - Neurosurgery consulted: no intervention planned inpatient   - started on decadron; now being tapered, currently on 2 mg q 8 hours   - LN biopsy taken on 5/5 consistent with NSCLC; molecular pending   - Transferred to [facility] for inpatient WBRT given difficult social situation. Ensure RadOnc is on board here.   - Patient appears to have good performance status, walks around the room, not on supplemental 02. Patient can be a candidate for DMT. Patient reports understanding of his diagnosis and need for treatment, although specifics were not discussed. Social situation has been taken into account and it is not a reason to not offer treatment. Discussed with Sister, HCP and patient himself. They understand the different mode of treatments to include radiation, chemotherapy/immunotherapy. Patient reports willingness to get chemotherapy and that this requires social support. Explained the possible need for HO vs NH in order to have that support to undergo treatment. Per sister (HCP), patient has been accepted at two different facilities in [place] and that herself lives in [place]. In this case, it may be beneficial for family and patient to have care arranged in a center closer to them.       Jerry Adler MD, PGY-4  Hematology/Medical Oncology Fellow  Pager: [phone]  Available on Microsoft Teams  After 5pm or on weekends please contact  to page on-call fellow  77M with EtOH use disorder, no known PMHx as hasn't seen doctors in decades, presents with R sided weakness/numbness x 2 day, found to have left parietal mass w/ vasogenic edema. Was placed on ativan taper for EtOH withdrawal however became very lethargic, was discontinued.    # Metastatic NSCLC   - CT Head at admission: 2.4 x 2.0 x 2.6 cm left frontal/parietal mass with vasogenic edema   - MRI confirming multiple ring enhancing lesions in b/l frontal, left temporal lobes.   - CT C/A/P showing: clusters of nodules in the LLL, mediastinal LAD, paratracheal LAD, right hilar LAD.   - Neurosurgery consulted: no intervention planned inpatient   - started on decadron; now being tapered, currently on 2 mg q 8 hours   - LN biopsy taken on 5/5 consistent with NSCLC; molecular pending   - Transferred to [facility] for inpatient WBRT given difficult social situation. Ensure RadOnc is on board here.   - Patient appears to have good performance status, walks around the room, not on supplemental 02. Patient can be a candidate for DMT. Patient reports understanding of his diagnosis and need for treatment, although specifics were not discussed. Social situation has been taken into account and it is not a reason to not offer treatment. Discussed with Sister, HCP and patient himself. They understand the different mode of treatments to include radiation, chemotherapy/immunotherapy. Patient reports willingness to get chemotherapy and that this requires social support. Explained the possible need for HO vs NH in order to have that support to undergo treatment. Per sister (HCP), patient has been accepted at two different facilities in [place] and that herself lives in [place]. In this case, it may be beneficial for family and patient to have care arranged in a center closer to them.       Jerry Adler MD, PGY-4  Hematology/Medical Oncology Fellow  Pager: [phone]  Available on [employer] Teams  After 5pm or on weekends please contact  to page on-call fellow

## 2023-05-12 NOTE — H&P ADULT - PROBLEM SELECTOR PLAN 6
DVT: SCDs, in setting of brain mass  Diet: Regular  Dispo: pending clinical improvement; PT at [facility] recommended HO after discharge. Patient currently lives alone so further discussions regarding LTC facility vs safer living arrangement will need to be discussed prior to discharge. Will obtain PT evaluation at [facility]  GOC: Pt seen by palliative care and ethics at [facility]. HCP is spouse: Zoe, although sister Carli also part of discussion. Pt assigned his sister as HCP. Will need to discuss options with sister for ultimate plan.

## 2023-05-12 NOTE — H&P ADULT - PROBLEM SELECTOR PLAN 2
CT A/P w Emphysema. Patient likely with obstructive lung disease.   - Should have outpatient PFTs  - C/w Symbicort BID and Spiriva given suspicion for obstructive lung disease  - Incentive spirometer  - Maintain O2 sat > 88%. Check ambulatory O2 saturation prior to discharge to determine if he requires home O2.

## 2023-05-12 NOTE — H&P ADULT - PROBLEM SELECTOR PLAN 1
Pt presented to OSH w/ R sided weakness/numbness, found to have Left parietal mass w/ vasogenic edema on CT head. MRI head with multiple masses, concerning for mets. s/p hilar lymph node EBUS 5/5 consistent NSLCL. Oncology at [facility] consulted and plan for chemo pending RT. Transferred to [facility] for whole brain RT  - Consult radonc in AM  - Pt started on decadron at [facility] for vasogenic, currently on 2mg q8 hr. Will continue for now. Taper per primary team  - continue keppra 500mg BID Pt presented to OSH w/ R sided weakness/numbness, found to have Left parietal mass w/ vasogenic edema on CT head. MRI head with multiple masses, concerning for mets. s/p hilar lymph node EBUS 5/5 consistent NSLCL. Oncology at [facility] consulted and plan for chemo pending RT. Transferred to [facility] for whole brain RT  - Consult radonc in AM  - Pt started on decadron at [facility] for vasogenic; currently on 2mg q8 hr, being tapered. c/w at this time; taper per team  - continue keppra 500mg BID

## 2023-05-12 NOTE — H&P ADULT - HISTORY OF PRESENT ILLNESS
77M smoker with PMH of alcohol use disorder and dementia (A&Ox1-2 at baseline) with poor medical follow up presented initially to [facility] after he was found down by a friend due to right sided weakness and numbness. Initial CT head w/ Left parietal mass w/ vasogenic edema c/f metastatic dz. Subsequent MRI of brain with multiple enhancing lesions with surrounding vasogenic edema. Neurosurgery was consulted and recommended dexamethasone and keppra with plans for radiation down the line. CT scan showed mediastinal and right hilar lymphadenopathy and nonspecific clustered nodules in the left lower lobe. Patient underwent EBUS biopsy on 5/5, which demonstrated lung adenocarcinoma. Oncology was consulted and recommended Rad onc consult. Rad onc recommended transfer to [facility] for brain radiation. During hospitalization, R-sided deficits improved and dexamethasone was started to be tapered on 5/11    During hospitalization at [facility], psych was consulted for patient's capacity and it was determined that patient's sister Michelle will be his HCP and aide in medical decision making.     Pt evaluated bedside. Pt denies any complaints at this time. Reports his strength has been improving

## 2023-05-12 NOTE — DIETITIAN INITIAL EVALUATION ADULT - PERTINENT LABORATORY DATA
05-12    137  |  102  |  38<H>  ----------------------------<  112<H>  4.1   |  23  |  1.06    Ca    9.3      12 May 2023 05:15  Phos  3.3     05-12  Mg     1.90     05-12    TPro  6.1  /  Alb  3.5  /  TBili  0.4  /  DBili  x   /  AST  15  /  ALT  15  /  AlkPhos  81  05-12  POCT Blood Glucose.: 99 mg/dL (05-12-23 @ 09:08)  A1C with Estimated Average Glucose Result: 5.7 % (05-12-23 @ 05:15)

## 2023-05-12 NOTE — PROGRESS NOTE ADULT - PROBLEM SELECTOR PLAN 6
DVT: SCDs, in setting of brain mass  Diet: Regular  Dispo: pending clinical improvement; PT at [facility] recommended HO after discharge. Patient currently lives alone so further discussions regarding LTC facility vs safer living arrangement will need to be discussed prior to discharge. Will obtain PT evaluation at [facility]  GOC: Pt seen by palliative care and ethics at [facility]. HCP is spouse: Zoe, although sister Carli also part of discussion. Pt assigned his sister as HCP. Will need to discuss options with sister for ultimate plan. DVT: SCDs, in setting of brain mass  Diet: Regular  Dispo: pending clinical improvement; PT at [facility] recommended HO after discharge. Patient currently lives alone so further discussions regarding LTC facility vs safer living arrangement will need to be discussed prior to discharge. Will obtain PT evaluation at [facility]  GOC: Pt seen by palliative care and ethics at [facility]. HCP is sister DVT: SCDs, in setting of brain mass  Diet: Regular  Dispo: pending clinical improvement; PT at [facility] recommended HO after discharge. Patient currently lives alone so further discussions regarding LTC facility vs safer living arrangement will need to be discussed prior to discharge. PT at [facility] recommending outpt PT.  GOC: Pt seen by palliative care and ethics at [facility]. HCP is sister [FreeTextEntry1] : EKG performed: SR at 74 bpm, normal axis, +ve PVC x 1 no ST/T changes \par \par see lab orders\par \par see referral

## 2023-05-12 NOTE — DIETITIAN INITIAL EVALUATION ADULT - PROBLEM SELECTOR PLAN 6
DVT: SCDs, in setting of brain mass  Diet: Regular  Dispo: pending clinical improvement; PT at [facility] recommended HO after discharge. Patient currently lives alone so further discussions regarding LTC facility vs safer living arrangement will need to be discussed prior to discharge. Will obtain PT evaluation at [facility]  GOC: Pt seen by palliative care and ethics at [facility]. HCP is spouse: Zoe, although sister Carli also part of discussion. Pt assigned his sister as HCP. Will need to discuss options with sister for ultimate plan.

## 2023-05-12 NOTE — H&P ADULT - NSHPLABSRESULTS_GEN_ALL_CORE
LABS: All labs reviewed from [facility]                        RADIOLOGY & ADDITIONAL TESTS: Reviewed. \  < from: CT Chest, Abdomen/Pelvis w/ IV Cont (04.24.23 @ 05:34) >    IMPRESSION:  Mediastinal and right hilar lymphadenopathy. Nonspecific clustered   nodules in the left lower lobe.    Otherwise no evidence for metastatic disease within the chest or abdomen.   Heterogeneous vertebral body appearance which could be due to underlying   degenerative changes. Correlation with nuclear medicine bone scan may be   of benefit.    < end of copied text >    < from: CT Head No Cont (04.23.23 @ 22:20) >    IMPRESSION:  Left frontal parietal mass with marked vasogenic edema. Further   evaluation with contrast-enhanced MRI is recommended.    < end of copied text >    < from: CT Angio Head w/ IV Cont (04.23.23 @ 22:28) >    IMPRESSION:  CT brain perfusion: Abnormal perfusion in the left cerebral white matter,   most likely related to vasogenic edema.      CTA head and neck:  -Left vertebral artery is not visualized until approximately C4 level,   and is diminutive in size and attenuated, likely related to   occlusion/severe stenosis.  -No CTA evidence of aneurysm, or dissection.    < end of copied text >    < from: MR Brain Stereotactic w/wo IV Cont (04.24.23 @ 14:49) >      IMPRESSION:  Multiple enhancing masses within the brain, the largest ring   enhancing mass measuring 2.9 x 2.7 x 3.0 cm in the LEFT parietal lobe   with moderate surrounding edema which demonstrates mass effect on the   posterior horn of the LEFT lateral ventricle. A 1.4 cm lesion is medially   with mild surrounding edema and a 9 mm ring-enhancing lesion is seen in   the RIGHT occipital cortex with minimal edema. 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    < end of copied text > LABS: All labs reviewed from [facility]    RADIOLOGY & ADDITIONAL TESTS: Reviewed. \  < from: CT Chest, Abdomen/Pelvis w/ IV Cont (04.24.23 @ 05:34) >    IMPRESSION:  Mediastinal and right hilar lymphadenopathy. Nonspecific clustered   nodules in the left lower lobe.    Otherwise no evidence for metastatic disease within the chest or abdomen.   Heterogeneous vertebral body appearance which could be due to underlying   degenerative changes. Correlation with nuclear medicine bone scan may be   of benefit.    < end of copied text >    < from: CT Head No Cont (04.23.23 @ 22:20) >    IMPRESSION:  Left frontal parietal mass with marked vasogenic edema. Further   evaluation with contrast-enhanced MRI is recommended.    < end of copied text >    < from: CT Angio Head w/ IV Cont (04.23.23 @ 22:28) >    IMPRESSION:  CT brain perfusion: Abnormal perfusion in the left cerebral white matter,   most likely related to vasogenic edema.      CTA head and neck:  -Left vertebral artery is not visualized until approximately C4 level,   and is diminutive in size and attenuated, likely related to   occlusion/severe stenosis.  -No CTA evidence of aneurysm, or dissection.    < end of copied text >    < from: MR Brain Stereotactic w/wo IV Cont (04.24.23 @ 14:49) >      IMPRESSION:  Multiple enhancing masses within the brain, the largest ring   enhancing mass measuring 2.9 x 2.7 x 3.0 cm in the LEFT parietal lobe   with moderate surrounding edema which demonstrates mass effect on the   posterior horn of the LEFT lateral ventricle. A 1.4 cm lesion is medially   with mild surrounding edema and a 9 mm ring-enhancing lesion is seen in   the RIGHT occipital cortex with minimal edema. 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    < end of copied text >

## 2023-05-12 NOTE — PHYSICAL THERAPY INITIAL EVALUATION ADULT - PATIENT PROFILE REVIEW, REHAB EVAL
Activity - Ambulate with assistance. Pt cleared for PT evaluation prior to session by BREA Kurtz./yes

## 2023-05-12 NOTE — PROGRESS NOTE ADULT - PROBLEM SELECTOR PLAN 3
Pt w/ leukocytosis to 12, no active signs of infection on lab work or based on VSS. Leukocystosis i/s/o decadron  - CTM qd Pt w/ leukocytosis to 12, no active signs of infection on lab work or based on VSS. Leukocystosis i/s/o decadron     - CTM qd

## 2023-05-12 NOTE — PHYSICAL THERAPY INITIAL EVALUATION ADULT - ADDITIONAL COMMENTS
Pt A&Ox2, confused, unreliable historian. As per documentation, pt is independent in ADLs and mobility, lives with daughter. Please follow case management/social work documentation for further history on prior level of function.    Following evaluation, pt was left semireclined in bed in no distress, all lines in tact, call andrews in reach. BREA bar.

## 2023-05-12 NOTE — DISCHARGE NOTE PROVIDER - CARE PROVIDER_API CALL
Toby Baptiste)  Medicine  Hematology  Onclgy  [address] [place] [address]  Phone: [phone]  Fax: [phone]  Follow Up Time: 1 week

## 2023-05-12 NOTE — PROGRESS NOTE ADULT - PROBLEM SELECTOR PLAN 4
Daily consumption of a bottle of rum. No hx of withdrawal seizures, hallucinations, DTs or hospitalizations  - Has been in the hospital >14 days. No need for withdrawal precautions  - SBIRT eval Daily consumption of a bottle of rum. No hx of withdrawal seizures, hallucinations, DTs or hospitalizations    - Has been in the hospital >14 days. No need for withdrawal precautions  - SBIRT eval

## 2023-05-12 NOTE — DIETITIAN INITIAL EVALUATION ADULT - OTHER INFO
77M with EtOH use disorder, no known PMHx as hasn't seen doctors in decades, presents with R sided weakness/numbness x 2 day, found to have left parietal mass w/ vasogenic edema. Was placed on ativan taper for EtOH withdrawal however became very lethargic, was discontinued.   CT Head at admission: 2.4 x 2.0 x 2.6 cm left frontal/parietal mass with vasogenic edema. MRI confirming multiple ring enhancing lesions in b/l frontal, left temporal lobes. CT C/A/P showing: clusters of nodules in the LLL, mediastinal LAD, paratracheal LAD, right hilar LAD.     Pt seen and reports 100% intake of meals with No GI distress (nausea/vomiting/diarrhea/constipation.) at present. Pt also reports UBW- 154#, dosing wt- 122#. Question the accuracy of wts. Suggest reweigh after zeroing the bed scale. Pt noted with signs of muscle wasting/fat loss upon visualization. Rec supplement Ensure Plus x2/day and pt amenable to it. Pt uses dentures and able to consume regular consistency foods.

## 2023-05-12 NOTE — DISCHARGE NOTE PROVIDER - HOSPITAL COURSE
77M smoker with PMH of alcohol use disorder and dementia (A&Ox1-2 at baseline) with poor medical follow up presented initially to [facility] after he was found down by a friend due to right sided weakness and numbness. Initial CT head w/ Left parietal mass w/ vasogenic edema c/f metastatic dz. Subsequent MRI of brain with multiple enhancing lesions with surrounding vasogenic edema. Neurosurgery was consulted and recommended dexamethasone and keppra with plans for radiation down the line. CT scan showed mediastinal and right hilar lymphadenopathy and nonspecific clustered nodules in the left lower lobe. Patient underwent EBUS biopsy on 5/5, which demonstrated lung adenocarcinoma. Oncology was consulted and recommended Rad onc consult. Rad onc recommended transfer to [facility] for brain radiation. During hospitalization, R-sided deficits improved and dexamethasone was started to be tapered. During hospitalization at [facility], psych was consulted for patient's capacity and it was determined that patient's sister Michelle will be his HCP and aide in medical decision making.     At [facility], radiation-oncology was consulted for whole brain radiation therapy, which was done 77M smoker with PMH of alcohol use disorder and dementia (A&Ox2 at baseline) with poor medical follow up presented initially to [facility] after he was found down by a friend due to right sided weakness and numbness. Initial CT head w/ Left parietal mass w/ vasogenic edema c/f metastatic dz. Subsequent MRI of brain with multiple enhancing lesions with surrounding vasogenic edema. Neurosurgery was consulted and recommended dexamethasone and keppra with plans for radiation down the line. CT scan showed mediastinal and right hilar lymphadenopathy and nonspecific clustered nodules in the left lower lobe. Patient underwent EBUS biopsy on 5/5, which demonstrated non-small cell carcinoma, favor adenocarcinoma and favor pulmonary primary. Oncology was consulted and recommended Rad onc consult. Rad onc recommended transfer to [facility] for brain radiation. During hospitalization, R-sided deficits improved and dexamethasone was started to be tapered. During hospitalization at [facility], psych was consulted for patient's capacity and it was determined that patient's sister Michelle will be his HCP and aide in medical decision making.     At [facility], radiation-oncology was consulted for whole brain radiation therapy, which was done 5/15-5/19. Oncology was consulted and deemed the patient a candidate for DMT to be followed up outpatient. PT recommended outpatient PT. 77M smoker with PMH of alcohol use disorder and dementia (A&Ox2 at baseline) with poor medical follow up presented initially to [facility] after he was found down by a friend due to right sided weakness and numbness. Initial CT head w/ Left parietal mass w/ vasogenic edema c/f metastatic dz. Subsequent MRI of brain with multiple enhancing lesions with surrounding vasogenic edema. Neurosurgery was consulted and recommended dexamethasone and keppra with plans for radiation down the line. CT scan showed mediastinal and right hilar lymphadenopathy and nonspecific clustered nodules in the left lower lobe. Patient underwent EBUS biopsy on 5/5, which demonstrated non-small cell carcinoma, favor adenocarcinoma and favor pulmonary primary. Oncology was consulted and recommended Rad onc consult. Rad onc recommended transfer to [facility] for brain radiation. During hospitalization, R-sided deficits improved and dexamethasone was started to be tapered.  At [facility], radiation-oncology was consulted for whole brain radiation therapy, which was done 5/15-5/19. Oncology was consulted and deemed the patient a candidate for DMT to be followed up outpatient. PT recommended outpatient PT.  Patient received WBRT 5/15 - 5/19/23. 77M smoker with PMH of alcohol use disorder and dementia (A&Ox2 at baseline) with poor medical follow up presented initially to [facility] after he was found down by a friend due to right sided weakness and numbness. Initial CT head w/ Left parietal mass w/ vasogenic edema c/f metastatic dz. Subsequent MRI of brain with multiple enhancing lesions with surrounding vasogenic edema. Neurosurgery was consulted and recommended dexamethasone and keppra with plans for radiation down the line. CT scan showed mediastinal and right hilar lymphadenopathy and nonspecific clustered nodules in the left lower lobe. Patient underwent EBUS biopsy on 5/5, which demonstrated non-small cell carcinoma, favor adenocarcinoma and favor pulmonary primary. Oncology was consulted and recommended Rad onc consult. Rad onc recommended transfer to [facility] for brain radiation. During hospitalization, R-sided deficits improved and dexamethasone was started to be tapered.  At [facility], radiation-oncology was consulted for whole brain radiation therapy, which was done 5/15-5/19. Oncology was consulted and deemed the patient a candidate for DMT to be followed up outpatient. PT recommended outpatient PT.

## 2023-05-12 NOTE — DISCHARGE NOTE PROVIDER - NSDCCPTREATMENT_GEN_ALL_CORE_FT
PRINCIPAL PROCEDURE  Procedure: MRI head  Findings and Treatment: Multiple enhancing masses within the brain, the largest ring   enhancing mass measuring 2.9 x 2.7 x 3.0 cm in the LEFT parietal lobe   with moderate surrounding edema which demonstrates mass effect on the   posterior horn of the LEFT lateral ventricle. A 1.4 cm lesion is medially   with mild surrounding edema and a 9 mm ring-enhancing lesion is seen in   the RIGHT occipital cortex with minimal edema. 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        SECONDARY PROCEDURE  Procedure: CT abdomen and chest wo contrast  Findings and Treatment: Mediastinal and right hilar lymphadenopathy. Nonspecific clustered   nodules in the left lower lobe.   Secretions noted within the lower trachea and   right mainstem bronchus. Emphysema. Cluster of nodules in the left lower lobe (series 2 images 56 through 62). Nonspecific area of groundglass attenuation in the lingula (2-52). Linear and subsegmental atelectasis.       PRINCIPAL PROCEDURE  Procedure: MRI head  Findings and Treatment: Multiple enhancing masses within the brain, the largest ring  enhancing mass measuring 2.9 x 2.7 x 3.0 cm in the LEFT parietal lobe with moderate surrounding edema which demonstrates mass effect on the posterior horn of the LEFT lateral ventricle. A 1.4 cm lesion is medially with mild surrounding edema and a 9 mm ring-enhancing lesion is seen in the RIGHT occipital cortex with minimal edema. 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        SECONDARY PROCEDURE  Procedure: CT abdomen and chest wo contrast  Findings and Treatment: Mediastinal and right hilar lymphadenopathy. Nonspecific clustered  nodules in the left lower lobe. Secretions noted within the lower trachea and right mainstem bronchus. Emphysema. Cluster of nodules in the left lower lobe (series 2 images 56 through 62). Nonspecific area of groundglass attenuation in the lingula (2-52). Linear and subsegmental atelectasis.

## 2023-05-12 NOTE — PHYSICAL THERAPY INITIAL EVALUATION ADULT - PERTINENT HX OF CURRENT PROBLEM, REHAB EVAL
77 year old male presented initially to [facility] after he was found down by a friend due to right sided weakness and numbness. Initial CT head shows left parietal mass with vasogenic edema, subsequent MRI of brain with multiple enhancing lesions with surrounding vasogenic edema. Now transferred to [facility] for brain radiation.

## 2023-05-12 NOTE — H&P ADULT - NSHPPHYSICALEXAM_GEN_ALL_CORE
VITAL SIGNS:  T(C): 36.4 (05-12-23 @ 02:00), Max: 36.7 (05-11-23 @ 12:05)  T(F): 97.6 (05-12-23 @ 02:00), Max: 98.1 (05-11-23 @ 12:05)  HR: 58 (05-12-23 @ 02:00) (58 - 72)  BP: 133/72 (05-12-23 @ 02:00) (109/60 - 133/72)  RR: 17 (05-12-23 @ 02:00) (17 - 18)  SpO2: 100% (05-12-23 @ 02:00) (96% - 100%)    PHYSICAL EXAM:    Constitutional: WDWN resting comfortably in bed; NAD  Head: NC/AT  Eyes: PERRL, EOMI, anicteric sclera  ENT: no nasal discharge; MMM  Neck: supple; no JVD  Respiratory: CTA B/L; no W/R/R  Cardiac: +S1/S2; RRR; no M/R/G  Gastrointestinal: soft, NT/ND; no rebound or guarding; +BSx4  Extremities: WWP, no clubbing or cyanosis; no peripheral edema. Capillary refill <2 sec  Musculoskeletal: NROM x4; no joint swelling, tenderness or erythema  Vascular: 2+ radial, femoral, DP/PT pulses B/L  Dermatologic: skin warm, dry and intact  Neurologic:  AAOx1, RUE and LE 5/5 strength equal bilaterally , no deficits on exam.  Psychiatric: affect and characteristics of appearance, verbalizations, behaviors are appropriate

## 2023-05-12 NOTE — H&P ADULT - PROBLEM SELECTOR PLAN 4
Daily consumption of a bottle of rum. No hx of withdrawal seizures, hallucinations, DTs or hospitalizations  - Has been in the hospital >14 days. No need for withdrawal precautions  - SBIRT eval

## 2023-05-12 NOTE — PATIENT PROFILE ADULT - FALL HARM RISK - HARM RISK INTERVENTIONS
Assistance with ambulation/Assistance OOB with selected safe patient handling equipment/Communicate Risk of Fall with Harm to all staff/Monitor for mental status changes/Monitor gait and stability/Move patient closer to nurses' station/Reinforce activity limits and safety measures with patient and family/Reorient to person, place and time as needed/Tailored Fall Risk Interventions/Toileting schedule using arm’s reach rule for commode and bathroom/Use of alarms - bed, chair and/or voice tab/Visual Cue: Yellow wristband and red socks/Bed in lowest position, wheels locked, appropriate side rails in place/Call bell, personal items and telephone in reach/Instruct patient to call for assistance before getting out of bed or chair/Non-slip footwear when patient is out of bed/[place] to call system/Physically safe environment - no spills, clutter or unnecessary equipment/Purposeful Proactive Rounding/Room/bathroom lighting operational, light cord in reach

## 2023-05-12 NOTE — H&P ADULT - ATTENDING COMMENTS
I agree with the above H&P from ACP/Resident/Intern. I have personally seen and examined the patient.  I fully participated in the care of this patient.  I have made amendments to the documentation where necessary, and agree with the history, physical exam, and plan as documented by the Resident.  In addition:  HPI: 77M smoker with hx of alcohol use disorder and dementia (A&Ox1-2 at baseline) with poor medical follow up presented initially to [facility] after he was found down by a friend due to right sided weakness and numbness. Initial CT head w/ Left parietal mass w/ vasogenic edema c/f metastatic dz. Subsequent MRI of brain with multiple enhancing lesions with surrounding vasogenic edema. Neurosurgery was consulted and recommended dexamethasone and keppra with plans for radiation down the line. CT scan showed mediastinal and right hilar lymphadenopathy and nonspecific clustered nodules in the left lower lobe. Patient underwent EBUS biopsy on 5/5, which demonstrated lung adenocarcinoma. Oncology was consulted and recommended Rad onc consult. Rad onc recommended transfer to [facility] for brain radiation. During hospitalization, R-sided deficits improved and dexamethasone was started to be tapered on 5/11. During hospitalization at [facility], psych was consulted for patient's capacity and it was determined that patient's sister Michelle will be his HCP and aide in medical decision making. Pt at bedside is pleasant but not quite able to explain why he is currently here in the hospital. Otherwise, offers no complaints at this time.  Labs: Reviewed  Imaging: Reviewed  EKG: Reviewed  PHYSICAL EXAM:  GENERAL: NAD, comfortable appearing  CHEST/LUNG: Clear to auscultation bilaterally; No wheezes, rales or rhonchi  HEART: Regular rate and rhythm; No murmurs, rubs, or gallops, (+)S1, S2  ABDOMEN: Soft, Nontender, Nondistended; Normal Bowel sounds   EXTREMITIES:  2+ Peripheral Pulses, No clubbing, cyanosis, or edema  Neuro: CNII-XII intact, no focal deficits. Pleasant    A/P: Currently admitted for Rad-Onc consult. Neurologic deficit currently seems to be at baseline but patient does not seem to have capacity to make decisions at this time. Will continue decadron taper, keppra and Symbicort. Will need full GOC during hospitalization.

## 2023-05-12 NOTE — DISCHARGE NOTE PROVIDER - NSDCCPCAREPLAN_GEN_ALL_CORE_FT
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 We consulted radiation oncology to perform radiation on the brain. We also continued the steroid taper (that was started for the brain swelling).   When you go home, please continue to follow --. Please continue to take -.     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 We consulted radiation oncology to perform radiation on the brain. They did radiation from 5/15-5/19/23. We also continued the steroid tapered treatment.   When you go home, please continue to follow oncology for further drug modifiying treatment and systemic therapy. Please also follow up with radiation onology. Please continue to take steroids as prescribed with the tapered dose. Please follow up with your oncologist for further management shortly after discharge (within 1 week).  Please return to the emergency room immediately for any concerning symptoms including headaches, dizziness, blurry vision, muscle weakness, chest pain, or shortness of breath.      SECONDARY DISCHARGE DIAGNOSES  Diagnosis: Chronic obstructive pulmonary disease, unspecified COPD type  Assessment and Plan of Treatment: Please continue using your inhalers as prescribed and follow up with your primary doctor for further management of your COPD.

## 2023-05-12 NOTE — DISCHARGE NOTE PROVIDER - NSDCMRMEDTOKEN_GEN_ALL_CORE_FT
acetaminophen 325 mg oral tablet: 2 tab(s) orally every 6 hours As needed Temp greater or equal to 38C (100.4F), Mild Pain (1 - 3)  dexAMETHasone 2 mg oral tablet: 1 tab(s) orally every 8 hours   acetaminophen 325 mg oral tablet: 2 tab(s) orally every 6 hours As needed Temp greater or equal to 38C (100.4F), Mild Pain (1 - 3)  dexAMETHasone 1 mg oral tablet: 1 tab(s) orally once a day x 36 days Please take the following Decadron Taper as follows:   4 Tablets twice a day for on 5/20 and 5/21  2 tablets twice a day on 5/22, 5/23, and 5/24  1 tablet twice a day on 5/25, 5/26, and 5/27  folic acid 1 mg oral tablet: 1 tab(s) orally once a day  levETIRAcetam 500 mg oral tablet: 1 tab(s) orally 2 times a day  Multiple Vitamins oral tablet: 1 tab(s) orally once a day  nicotine 7 mg/24 hr transdermal film, extended release: 1 patch transdermal once a day  pantoprazole 40 mg oral delayed release tablet: 1 tab(s) orally once a day (before a meal)   acetaminophen 325 mg oral tablet: 2 tab(s) orally every 6 hours As needed Temp greater or equal to 38C (100.4F), Mild Pain (1 - 3)  budesonide-formoterol 80 mcg-4.5 mcg/inh inhalation aerosol: 2 puff(s) inhaled 2 times a day  dexAMETHasone 1 mg oral tablet: 1 tab(s) orally once a day x 36 days Please take the following Decadron Taper as follows:   4 Tablets twice a day for on 5/20 and 5/21  2 tablets twice a day on 5/22, 5/23, and 5/24  1 tablet twice a day on 5/25, 5/26, and 5/27  folic acid 1 mg oral tablet: 1 tab(s) orally once a day  levETIRAcetam 500 mg oral tablet: 1 tab(s) orally 2 times a day  Multiple Vitamins oral tablet: 1 tab(s) orally once a day  nicotine 7 mg/24 hr transdermal film, extended release: 1 patch transdermal once a day  Outpatient Physical Therapy: Outpatient Physical Therapy  pantoprazole 40 mg oral delayed release tablet: 1 tab(s) orally once a day (before a meal)  tiotropium 2.5 mcg/inh inhalation aerosol: 2 puff(s) inhaled 2 times a day

## 2023-05-12 NOTE — PROGRESS NOTE ADULT - SUBJECTIVE AND OBJECTIVE BOX
MARY JARVIS 77y Male      Patient is a 77y old  Male who presents with a chief complaint of Radiation therapy (12 May 2023 07:53)        INTERVAL HPI/OVERNIGHT EVENTS: No acute events overnight. Patient was seen and evaluated at the bedside. The patient denies pain. Vitals stable. Patient denies fever/chills, chest pain, shortness of breath, abdominal pain, headaches, nausea/vomiting, and diarrhea/constipation.      PHYSICAL EXAM:  GENERAL: NAD  HEAD:  Normocephalic  EYES:  conjunctiva and sclera clear  ENMT: Moist mucous membranes  NECK: Supple  NERVOUS SYSTEM:  Alert, awake  CHEST/LUNG: Good air exchange bilaterally, no wheeze  HEART: Regular rate and rhythm        Vital Signs Last 24 Hrs  T(C): 36.5 (12 May 2023 05:30), Max: 36.7 (11 May 2023 12:05)  T(F): 97.7 (12 May 2023 05:30), Max: 98.1 (11 May 2023 12:05)  HR: 71 (12 May 2023 05:30) (58 - 72)  BP: 109/71 (12 May 2023 05:30) (109/60 - 133/72)  BP(mean): --  RR: 18 (12 May 2023 05:30) (17 - 18)  SpO2: 98% (12 May 2023 05:30) (96% - 100%)    Parameters below as of 12 May 2023 05:30  Patient On (Oxygen Delivery Method): room air          MEDICATIONS  (STANDING):  budesonide  80 MICROgram(s)/formoterol 4.5 MICROgram(s) Inhaler 2 Puff(s) Inhalation two times a day  dexAMETHasone     Tablet 2 milliGRAM(s) Oral every 8 hours  folic acid 1 milliGRAM(s) Oral daily  levETIRAcetam 500 milliGRAM(s) Oral two times a day  multivitamin 1 Tablet(s) Oral daily  mupirocin 2% Ointment 1 Application(s) Both Nostrils two times a day  nicotine -   7 mG/24Hr(s) Patch 1 Patch Transdermal daily  pantoprazole    Tablet 40 milliGRAM(s) Oral before breakfast  tiotropium 2.5 MICROgram(s) Inhaler 2 Puff(s) Inhalation daily    MEDICATIONS  (PRN):  acetaminophen     Tablet .. 650 milliGRAM(s) Oral every 6 hours PRN Temp greater or equal to 38C (100.4F), Mild Pain (1 - 3)      Consultant(s) Notes Reviewed:  [X] YES  [ ] NO  Care Discussed with Other Providers [X] YES  [ ] NO  Imaging Personally Reviewed:  [X] YES  [ ] NO      LABS:                        13.7   11.34 )-----------( 178      ( 12 May 2023 05:15 )             37.0     05-12    137  |  102  |  38<H>  ----------------------------<  112<H>  4.1   |  23  |  1.06    Ca    9.3      12 May 2023 05:15  Phos  3.3     05-12  Mg     1.90     05-12    TPro  6.1  /  Alb  3.5  /  TBili  0.4  /  DBili  x   /  AST  15  /  ALT  15  /  AlkPhos  81  05-12                   MARY JARVIS 77y Male    Initial Consultation     Patient is a 77y old  Male who presents with a chief complaint of Radiation therapy (12 May 2023 07:53)        INTERVAL HPI/OVERNIGHT EVENTS: No acute events overnight. Patient was seen and evaluated at the bedside. The patient denies pain. Vitals stable. Patient denies fever/chills, chest pain, shortness of breath, abdominal pain, headaches, nausea/vomiting, and diarrhea/constipation.      PHYSICAL EXAM:  GENERAL: NAD  HEAD:  Normocephalic  EYES:  conjunctiva and sclera clear  ENMT: Moist mucous membranes  NECK: Supple  NERVOUS SYSTEM:  Alert, awake  CHEST/LUNG: Good air exchange bilaterally, no wheeze  HEART: Regular rate and rhythm        Vital Signs Last 24 Hrs  T(C): 36.5 (12 May 2023 05:30), Max: 36.7 (11 May 2023 12:05)  T(F): 97.7 (12 May 2023 05:30), Max: 98.1 (11 May 2023 12:05)  HR: 71 (12 May 2023 05:30) (58 - 72)  BP: 109/71 (12 May 2023 05:30) (109/60 - 133/72)  BP(mean): --  RR: 18 (12 May 2023 05:30) (17 - 18)  SpO2: 98% (12 May 2023 05:30) (96% - 100%)    Parameters below as of 12 May 2023 05:30  Patient On (Oxygen Delivery Method): room air          MEDICATIONS  (STANDING):  budesonide  80 MICROgram(s)/formoterol 4.5 MICROgram(s) Inhaler 2 Puff(s) Inhalation two times a day  dexAMETHasone     Tablet 2 milliGRAM(s) Oral every 8 hours  folic acid 1 milliGRAM(s) Oral daily  levETIRAcetam 500 milliGRAM(s) Oral two times a day  multivitamin 1 Tablet(s) Oral daily  mupirocin 2% Ointment 1 Application(s) Both Nostrils two times a day  nicotine -   7 mG/24Hr(s) Patch 1 Patch Transdermal daily  pantoprazole    Tablet 40 milliGRAM(s) Oral before breakfast  tiotropium 2.5 MICROgram(s) Inhaler 2 Puff(s) Inhalation daily    MEDICATIONS  (PRN):  acetaminophen     Tablet .. 650 milliGRAM(s) Oral every 6 hours PRN Temp greater or equal to 38C (100.4F), Mild Pain (1 - 3)      Consultant(s) Notes Reviewed:  [X] YES  [ ] NO  Care Discussed with Other Providers [X] YES  [ ] NO  Imaging Personally Reviewed:  [X] YES  [ ] NO      LABS:                        13.7   11.34 )-----------( 178      ( 12 May 2023 05:15 )             37.0     05-12    137  |  102  |  38<H>  ----------------------------<  112<H>  4.1   |  23  |  1.06    Ca    9.3      12 May 2023 05:15  Phos  3.3     05-12  Mg     1.90     05-12    TPro  6.1  /  Alb  3.5  /  TBili  0.4  /  DBili  x   /  AST  15  /  ALT  15  /  AlkPhos  81  05-12

## 2023-05-13 NOTE — PROGRESS NOTE ADULT - PROBLEM SELECTOR PLAN 6
DVT: SCDs, in setting of brain mass  Diet: Regular  Dispo: pending clinical improvement; PT at [facility] recommended HO after discharge. Patient currently lives alone so further discussions regarding LTC facility vs safer living arrangement will need to be discussed prior to discharge. PT at [facility] recommending outpt PT.  GOC: Pt seen by palliative care and ethics at [facility]. HCP is sister

## 2023-05-13 NOTE — PROGRESS NOTE ADULT - SUBJECTIVE AND OBJECTIVE BOX
PROGRESS NOTE:     Patient is a 77y old  Male who presents with a chief complaint of Primary hypertension     (12 May 2023 15:31)      INTERVAL HISTORY: NAEO. VSS. ROS negative.    MEDICATIONS  (STANDING):  budesonide  80 MICROgram(s)/formoterol 4.5 MICROgram(s) Inhaler 2 Puff(s) Inhalation two times a day  dexAMETHasone     Tablet 2 milliGRAM(s) Oral every 8 hours  folic acid 1 milliGRAM(s) Oral daily  levETIRAcetam 500 milliGRAM(s) Oral two times a day  multivitamin 1 Tablet(s) Oral daily  mupirocin 2% Ointment 1 Application(s) Both Nostrils two times a day  nicotine -   7 mG/24Hr(s) Patch 1 Patch Transdermal daily  pantoprazole    Tablet 40 milliGRAM(s) Oral before breakfast  tiotropium 2.5 MICROgram(s) Inhaler 2 Puff(s) Inhalation daily    MEDICATIONS  (PRN):  acetaminophen     Tablet .. 650 milliGRAM(s) Oral every 6 hours PRN Temp greater or equal to 38C (100.4F), Mild Pain (1 - 3)      CAPILLARY BLOOD GLUCOSE      POCT Blood Glucose.: 133 mg/dL (12 May 2023 21:56)  POCT Blood Glucose.: 99 mg/dL (12 May 2023 09:08)  POCT Blood Glucose.: 116 mg/dL (12 May 2023 07:35)    I&O's Summary      PHYSICAL EXAM:  Vital Signs Last 24 Hrs  T(C): 36.6 (12 May 2023 21:03), Max: 36.6 (12 May 2023 13:35)  T(F): 97.9 (12 May 2023 21:03), Max: 97.9 (12 May 2023 13:35)  HR: 59 (12 May 2023 21:03) (59 - 70)  BP: 98/60 (12 May 2023 21:03) (98/60 - 115/79)  BP(mean): --  RR: 18 (12 May 2023 21:03) (18 - 18)  SpO2: 99% (12 May 2023 21:03) (99% - 100%)    Parameters below as of 12 May 2023 21:03  Patient On (Oxygen Delivery Method): room air        CONSTITUTIONAL: NAD, well-developed  HEENT:   RESPIRATORY: Normal respiratory effort; lungs are clear to auscultation bilaterally  CARDIOVASCULAR: Regular rate and rhythm, normal S1 and S2, no murmur/rub/gallop; No lower extremity edema; Peripheral pulses are 2+ bilaterally  ABDOMEN: Nontender to palpation, normoactive bowel sounds, no rebound/guarding; No hepatosplenomegaly  MUSCULOSKELETAL: no clubbing or cyanosis of digits; no joint swelling or tenderness to palpation  PSYCH: A+O to person, place, and time; affect appropriate    LABS:                        13.7   11.34 )-----------( 178      ( 12 May 2023 05:15 )             37.0     05-12    137  |  102  |  38<H>  ----------------------------<  112<H>  4.1   |  23  |  1.06    Ca    9.3      12 May 2023 05:15  Phos  3.3     05-12  Mg     1.90     05-12    TPro  6.1  /  Alb  3.5  /  TBili  0.4  /  DBili  x   /  AST  15  /  ALT  15  /  AlkPhos  81  05-12                RADIOLOGY:        ******************************  Authored By: Vesta Duarte MD PGY1  Internal Medicine  MS Teams  ******************************   PROGRESS NOTE:     Patient is a 77y old  Male who presents with a chief complaint of Primary hypertension     (12 May 2023 15:31)      INTERVAL HISTORY: NAEO. VSS. ROS negative. Feels great on exam, walking to bathroom. Looking forward to family visiting today.     MEDICATIONS  (STANDING):  budesonide  80 MICROgram(s)/formoterol 4.5 MICROgram(s) Inhaler 2 Puff(s) Inhalation two times a day  dexAMETHasone     Tablet 2 milliGRAM(s) Oral every 8 hours  folic acid 1 milliGRAM(s) Oral daily  levETIRAcetam 500 milliGRAM(s) Oral two times a day  multivitamin 1 Tablet(s) Oral daily  mupirocin 2% Ointment 1 Application(s) Both Nostrils two times a day  nicotine -   7 mG/24Hr(s) Patch 1 Patch Transdermal daily  pantoprazole    Tablet 40 milliGRAM(s) Oral before breakfast  tiotropium 2.5 MICROgram(s) Inhaler 2 Puff(s) Inhalation daily    MEDICATIONS  (PRN):  acetaminophen     Tablet .. 650 milliGRAM(s) Oral every 6 hours PRN Temp greater or equal to 38C (100.4F), Mild Pain (1 - 3)      CAPILLARY BLOOD GLUCOSE      POCT Blood Glucose.: 133 mg/dL (12 May 2023 21:56)  POCT Blood Glucose.: 99 mg/dL (12 May 2023 09:08)  POCT Blood Glucose.: 116 mg/dL (12 May 2023 07:35)    I&O's Summary      PHYSICAL EXAM:  Vital Signs Last 24 Hrs  T(C): 36.6 (12 May 2023 21:03), Max: 36.6 (12 May 2023 13:35)  T(F): 97.9 (12 May 2023 21:03), Max: 97.9 (12 May 2023 13:35)  HR: 59 (12 May 2023 21:03) (59 - 70)  BP: 98/60 (12 May 2023 21:03) (98/60 - 115/79)  BP(mean): --  RR: 18 (12 May 2023 21:03) (18 - 18)  SpO2: 99% (12 May 2023 21:03) (99% - 100%)    Parameters below as of 12 May 2023 21:03  Patient On (Oxygen Delivery Method): room air        CONSTITUTIONAL: NAD, well-developed  HEENT: on RA   RESPIRATORY: Normal respiratory effort; lungs are clear to auscultation bilaterally  CARDIOVASCULAR: Regular rate and rhythm, normal S1 and S2, no murmur/rub/gallop; No lower extremity edema; Peripheral pulses are 2+ bilaterally  ABDOMEN: Nontender to palpation, normoactive bowel sounds, no rebound/guarding; No hepatosplenomegaly  MUSCULOSKELETAL: no clubbing or cyanosis of digits; no joint swelling or tenderness to palpation  PSYCH: AOx2 (self and location- near a rehab)  NEURO: 5/5 b/l UE/LE strength, able to sense light touch everywhere, CN2-12 intact     LABS:                        13.7   11.34 )-----------( 178      ( 12 May 2023 05:15 )             37.0     05-12    137  |  102  |  38<H>  ----------------------------<  112<H>  4.1   |  23  |  1.06    Ca    9.3      12 May 2023 05:15  Phos  3.3     05-12  Mg     1.90     05-12    TPro  6.1  /  Alb  3.5  /  TBili  0.4  /  DBili  x   /  AST  15  /  ALT  15  /  AlkPhos  81  05-12          ******************************  Authored By: Vesta Duarte MD PGY1  Internal Medicine  MS Teams  ******************************

## 2023-05-13 NOTE — PROGRESS NOTE ADULT - ASSESSMENT
77M with ETOH use disorder and recently NSCLC w/ mets to the brain transferred from OSH for RT. Admitted to medicine for further management while undergoing radiation.  77M with ETOH use disorder and recently NSCLC w/ mets to the brain transferred from OSH for RT. Admitted to medicine for further management while undergoing radiation 5/15-5/19.

## 2023-05-13 NOTE — PROGRESS NOTE ADULT - PROBLEM SELECTOR PLAN 1
Pt presented to OSH w/ R sided weakness/numbness, found to have Left parietal mass w/ vasogenic edema on CT head. MRI head with multiple masses, concerning for mets. s/p hilar lymph node EBUS 5/5 consistent NSLCL. Oncology at [facility] consulted and plan for chemo pending RT. Transferred to [facility] for whole brain RT    - rad onc consulted - will consent HCP  - Pt started on decadron at [facility] for vasogenic; currently on 2mg q8 hr, being tapered. c/w at this time over the weekend until radiation therapy starts     - while on radiation therapy, can go down to decadron 2mg BID     - once radiation therapy completed, can do decadron 2mg qd   - continue keppra 500mg BID Pt presented to OSH w/ R sided weakness/numbness, found to have Left parietal mass w/ vasogenic edema on CT head. MRI head with multiple masses, concerning for mets. s/p hilar lymph node EBUS 5/5 consistent NSLCL. Oncology at [facility] consulted and plan for chemo pending RT. Transferred to [facility] for whole brain RT    - rad onc consulted and will perform whole brain radiation 5/15-5/19  - Pt started on decadron at [facility] for vasogenic; currently on 2mg q8 hr, being tapered. c/w at this time over the weekend until radiation therapy starts     - while on radiation therapy, can go down to decadron 2mg BID     - once radiation therapy completed, can do decadron 2mg qd   - continue keppra 500mg BID

## 2023-05-14 NOTE — PROGRESS NOTE ADULT - PROBLEM SELECTOR PLAN 6
DVT: SCDs, in setting of brain mass  Diet: Regular  Dispo: pending clinical improvement; PT at [facility] recommended HO after discharge. Patient currently lives alone so further discussions regarding LTC facility vs safer living arrangement will need to be discussed prior to discharge. PT at [facility] recommending outpt PT.  GOC: Pt seen by palliative care and ethics at [facility]. HCP is sister

## 2023-05-14 NOTE — PROGRESS NOTE ADULT - SUBJECTIVE AND OBJECTIVE BOX
PROGRESS NOTE:     Patient is a 77y old  Male who presents with a chief complaint of Primary hypertension     (12 May 2023 15:31)      INTERVAL HISTORY: NAEO. VSS. ROS negative. Patient with good appetite. No complaints.     MEDICATIONS  (STANDING):  budesonide  80 MICROgram(s)/formoterol 4.5 MICROgram(s) Inhaler 2 Puff(s) Inhalation two times a day  dexAMETHasone     Tablet 2 milliGRAM(s) Oral every 8 hours  folic acid 1 milliGRAM(s) Oral daily  levETIRAcetam 500 milliGRAM(s) Oral two times a day  multivitamin 1 Tablet(s) Oral daily  mupirocin 2% Ointment 1 Application(s) Both Nostrils two times a day  nicotine -   7 mG/24Hr(s) Patch 1 Patch Transdermal daily  pantoprazole    Tablet 40 milliGRAM(s) Oral before breakfast  tiotropium 2.5 MICROgram(s) Inhaler 2 Puff(s) Inhalation daily    MEDICATIONS  (PRN):  acetaminophen     Tablet .. 650 milliGRAM(s) Oral every 6 hours PRN Temp greater or equal to 38C (100.4F), Mild Pain (1 - 3)      CAPILLARY BLOOD GLUCOSE      POCT Blood Glucose.: 133 mg/dL (12 May 2023 21:56)  POCT Blood Glucose.: 99 mg/dL (12 May 2023 09:08)  POCT Blood Glucose.: 116 mg/dL (12 May 2023 07:35)    I&O's Summary      PHYSICAL EXAM:  Vital Signs Last 24 Hrs  T(C): 36.6 (14 May 2023 06:50), Max: 36.8 (13 May 2023 21:11)  T(F): 97.8 (14 May 2023 06:50), Max: 98.3 (13 May 2023 21:11)  HR: 68 (14 May 2023 06:50) (68 - 71)  BP: 117/69 (14 May 2023 06:50) (105/68 - 117/69)  BP(mean): --  RR: 17 (14 May 2023 06:50) (17 - 18)  SpO2: 100% (14 May 2023 06:50) (98% - 100%)    Parameters below as of 14 May 2023 06:50  Patient On (Oxygen Delivery Method): room air            CONSTITUTIONAL: NAD, well-developed  HEENT: on RA   RESPIRATORY: Normal respiratory effort; lungs are clear to auscultation bilaterally  CARDIOVASCULAR: Regular rate and rhythm, normal S1 and S2, no murmur/rub/gallop; No lower extremity edema; Peripheral pulses are 2+ bilaterally  ABDOMEN: Nontender to palpation, normoactive bowel sounds, no rebound/guarding; No hepatosplenomegaly  MUSCULOSKELETAL: no clubbing or cyanosis of digits; no joint swelling or tenderness to palpation  PSYCH: AOx2 (self and location- near a rehab)  NEURO: 5/5 b/l UE/LE strength, able to sense light touch everywhere, CN2-12 intact     LABS:                         13.7   8.77  )-----------( 166      ( 13 May 2023 06:00 )             38.2     05-13    137  |  102  |  35<H>  ----------------------------<  99  4.2   |  25  |  0.97    Ca    9.2      13 May 2023 06:00  Phos  3.7     05-13  Mg     1.90     05-13    TPro  6.0  /  Alb  3.4  /  TBili  0.5  /  DBili  x   /  AST  15  /  ALT  16  /  AlkPhos  75  05-13

## 2023-05-14 NOTE — PROGRESS NOTE ADULT - ASSESSMENT
77M with ETOH use disorder and recently NSCLC w/ mets to the brain transferred from OSH for RT. Admitted to medicine for further management while undergoing radiation 5/15-5/19.

## 2023-05-14 NOTE — PROGRESS NOTE ADULT - PROBLEM SELECTOR PLAN 1
Pt presented to OSH w/ R sided weakness/numbness, found to have Left parietal mass w/ vasogenic edema on CT head. MRI head with multiple masses, concerning for mets. s/p hilar lymph node EBUS 5/5 consistent NSLCL. Oncology at [facility] consulted and plan for chemo pending RT. Transferred to [facility] for whole brain RT    - rad onc consulted and will perform whole brain radiation 5/15-5/19  - Pt started on decadron at [facility] for vasogenic; currently on 2mg q8 hr, being tapered. c/w at this time over the weekend until radiation therapy starts     - while on radiation therapy, can go down to decadron 2mg BID     - once radiation therapy completed, can do decadron 2mg qd   - continue keppra 500mg BID

## 2023-05-15 NOTE — PROGRESS NOTE ADULT - SUBJECTIVE AND OBJECTIVE BOX
PROGRESS NOTE:     Patient is a 77y old  Male who presents with a chief complaint of Primary hypertension     (12 May 2023 15:31)      INTERVAL HISTORY: NAEO. VSS. ROS negative. Patient with good appetite. No complaints.     MEDICATIONS  (STANDING):  budesonide  80 MICROgram(s)/formoterol 4.5 MICROgram(s) Inhaler 2 Puff(s) Inhalation two times a day  dexAMETHasone     Tablet 2 milliGRAM(s) Oral every 8 hours  folic acid 1 milliGRAM(s) Oral daily  levETIRAcetam 500 milliGRAM(s) Oral two times a day  multivitamin 1 Tablet(s) Oral daily  mupirocin 2% Ointment 1 Application(s) Both Nostrils two times a day  nicotine -   7 mG/24Hr(s) Patch 1 Patch Transdermal daily  pantoprazole    Tablet 40 milliGRAM(s) Oral before breakfast  tiotropium 2.5 MICROgram(s) Inhaler 2 Puff(s) Inhalation daily    MEDICATIONS  (PRN):  acetaminophen     Tablet .. 650 milliGRAM(s) Oral every 6 hours PRN Temp greater or equal to 38C (100.4F), Mild Pain (1 - 3)      CAPILLARY BLOOD GLUCOSE      POCT Blood Glucose.: 133 mg/dL (12 May 2023 21:56)  POCT Blood Glucose.: 99 mg/dL (12 May 2023 09:08)  POCT Blood Glucose.: 116 mg/dL (12 May 2023 07:35)    I&O's Summary      PHYSICAL EXAM:  Vital Signs Last 24 Hrs  T(C): 36.6 (14 May 2023 06:50), Max: 36.8 (13 May 2023 21:11)  T(F): 97.8 (14 May 2023 06:50), Max: 98.3 (13 May 2023 21:11)  HR: 68 (14 May 2023 06:50) (68 - 71)  BP: 117/69 (14 May 2023 06:50) (105/68 - 117/69)  BP(mean): --  RR: 17 (14 May 2023 06:50) (17 - 18)  SpO2: 100% (14 May 2023 06:50) (98% - 100%)    Parameters below as of 14 May 2023 06:50  Patient On (Oxygen Delivery Method): room air            CONSTITUTIONAL: NAD, well-developed  HEENT: on RA   RESPIRATORY: Normal respiratory effort; lungs are clear to auscultation bilaterally  CARDIOVASCULAR: Regular rate and rhythm, normal S1 and S2, no murmur/rub/gallop; No lower extremity edema; Peripheral pulses are 2+ bilaterally  ABDOMEN: Nontender to palpation, normoactive bowel sounds, no rebound/guarding; No hepatosplenomegaly  MUSCULOSKELETAL: no clubbing or cyanosis of digits; no joint swelling or tenderness to palpation  PSYCH: AOx2 (self and location- near a rehab)  NEURO: 5/5 b/l UE/LE strength, able to sense light touch everywhere, CN2-12 intact     LABS:                         13.7   8.77  )-----------( 166      ( 13 May 2023 06:00 )             38.2     05-13    137  |  102  |  35<H>  ----------------------------<  99  4.2   |  25  |  0.97    Ca    9.2      13 May 2023 06:00  Phos  3.7     05-13  Mg     1.90     05-13    TPro  6.0  /  Alb  3.4  /  TBili  0.5  /  DBili  x   /  AST  15  /  ALT  16  /  AlkPhos  75  05-13                       PROGRESS NOTE:     Patient is a 77y old  Male who presents with a chief complaint of Primary hypertension     (12 May 2023 15:31)      INTERVAL HISTORY: No acute overnight events. Pt denies dizziness, headaches, unsteady gait. Eating breakfast.     MEDICATIONS  (STANDING):  budesonide  80 MICROgram(s)/formoterol 4.5 MICROgram(s) Inhaler 2 Puff(s) Inhalation two times a day  dexAMETHasone     Tablet 2 milliGRAM(s) Oral every 8 hours  folic acid 1 milliGRAM(s) Oral daily  levETIRAcetam 500 milliGRAM(s) Oral two times a day  multivitamin 1 Tablet(s) Oral daily  mupirocin 2% Ointment 1 Application(s) Both Nostrils two times a day  nicotine -   7 mG/24Hr(s) Patch 1 Patch Transdermal daily  pantoprazole    Tablet 40 milliGRAM(s) Oral before breakfast  tiotropium 2.5 MICROgram(s) Inhaler 2 Puff(s) Inhalation daily    MEDICATIONS  (PRN):  acetaminophen     Tablet .. 650 milliGRAM(s) Oral every 6 hours PRN Temp greater or equal to 38C (100.4F), Mild Pain (1 - 3)      CAPILLARY BLOOD GLUCOSE      POCT Blood Glucose.: 133 mg/dL (12 May 2023 21:56)  POCT Blood Glucose.: 99 mg/dL (12 May 2023 09:08)  POCT Blood Glucose.: 116 mg/dL (12 May 2023 07:35)    I&O's Summary      PHYSICAL EXAM:  Vital Signs Last 24 Hrs  T(C): 36.6 (14 May 2023 06:50), Max: 36.8 (13 May 2023 21:11)  T(F): 97.8 (14 May 2023 06:50), Max: 98.3 (13 May 2023 21:11)  HR: 68 (14 May 2023 06:50) (68 - 71)  BP: 117/69 (14 May 2023 06:50) (105/68 - 117/69)  BP(mean): --  RR: 17 (14 May 2023 06:50) (17 - 18)  SpO2: 100% (14 May 2023 06:50) (98% - 100%)    Parameters below as of 14 May 2023 06:50  Patient On (Oxygen Delivery Method): room air            CONSTITUTIONAL: NAD, well-developed  HEENT: on RA   RESPIRATORY: Normal respiratory effort; lungs are clear to auscultation bilaterally  CARDIOVASCULAR: Regular rate and rhythm, normal S1 and S2, no murmur/rub/gallop; No lower extremity edema; Peripheral pulses are 2+ bilaterally  ABDOMEN: Nontender to palpation, normoactive bowel sounds, no rebound/guarding; No hepatosplenomegaly  MUSCULOSKELETAL: no clubbing or cyanosis of digits; no joint swelling or tenderness to palpation  PSYCH: AOx2 (self and location- near a rehab)  NEURO: 5/5 b/l UE/LE strength, able to sense light touch everywhere, CN2-12 intact     LABS:                         13.7   8.77  )-----------( 166      ( 13 May 2023 06:00 )             38.2     05-13    137  |  102  |  35<H>  ----------------------------<  99  4.2   |  25  |  0.97    Ca    9.2      13 May 2023 06:00  Phos  3.7     05-13  Mg     1.90     05-13    TPro  6.0  /  Alb  3.4  /  TBili  0.5  /  DBili  x   /  AST  15  /  ALT  16  /  AlkPhos  75  05-13

## 2023-05-15 NOTE — PROGRESS NOTE ADULT - PROBLEM SELECTOR PLAN 1
Pt presented to OSH w/ R sided weakness/numbness, found to have Left parietal mass w/ vasogenic edema on CT head. MRI head with multiple masses, concerning for mets. s/p hilar lymph node EBUS 5/5 consistent NSLCL. Oncology at [facility] consulted and plan for chemo pending RT. Transferred to [facility] for whole brain RT    - rad onc consulted and will perform whole brain radiation 5/15-5/19  - Pt started on decadron at [facility] for vasogenic; currently on 2mg q8 hr, being tapered. c/w at this time over the weekend until radiation therapy starts     - while on radiation therapy, can go down to decadron 2mg BID     - once radiation therapy completed, can do decadron 2mg qd   - continue keppra 500mg BID Pt presented to OSH w/ R sided weakness/numbness, found to have Left parietal mass w/ vasogenic edema on CT head. MRI head with multiple masses, concerning for mets. s/p hilar lymph node EBUS 5/5 consistent NSLCL. Oncology at [facility] consulted and plan for chemo pending RT. Transferred to [facility] for whole brain RT    - rad onc consulted and will perform whole brain radiation 5/15-5/19  - Pt started on decadron at [facility] for vasogenic; s/p 2mg q8 hr  - continue keppra 500mg BID  - per Rad Onc, Decadron 4mg PO BID for the week

## 2023-05-15 NOTE — PROGRESS NOTE ADULT - PROBLEM SELECTOR PLAN 6
DVT: SCDs, in setting of brain mass  Diet: Regular  Dispo: pending clinical improvement; PT at [facility] recommended HO after discharge. Patient currently lives alone so further discussions regarding LTC facility vs safer living arrangement will need to be discussed prior to discharge. PT at [facility] recommending outpt PT.  GOC: Pt seen by palliative care and ethics at [facility]. HCP is sister

## 2023-05-16 NOTE — PROGRESS NOTE ADULT - PROBLEM SELECTOR PLAN 6
DVT: SCDs, in setting of brain mass  Diet: Regular  Dispo: pending clinical improvement; PT at [facility] recommended HO after discharge. Patient currently lives alone so further discussions regarding LTC facility vs safer living arrangement will need to be discussed prior to discharge. PT at [facility] recommending outpt PT.  GOC: Pt seen by palliative care and ethics at [facility]. HCP is sister

## 2023-05-16 NOTE — PROGRESS NOTE ADULT - PROBLEM SELECTOR PLAN 1
Pt presented to OSH w/ R sided weakness/numbness, found to have Left parietal mass w/ vasogenic edema on CT head. MRI head with multiple masses, concerning for mets. s/p hilar lymph node EBUS 5/5 consistent NSLCL. Oncology at [facility] consulted and plan for chemo pending RT. Transferred to [facility] for whole brain RT    - rad onc consulted and will perform whole brain radiation 5/15-5/19  - Pt started on decadron at [facility] for vasogenic; s/p 2mg q8 hr  - continue keppra 500mg BID  - per Rad Onc, Decadron 4mg PO BID for the week

## 2023-05-16 NOTE — PROGRESS NOTE ADULT - SUBJECTIVE AND OBJECTIVE BOX
PROGRESS NOTE:     Patient is a 77y old  Male who presents with a chief complaint of Primary hypertension     (12 May 2023 15:31)      INTERVAL HISTORY: No acute overnight events. Pt denies dizziness, headaches, unsteady gait. Eating breakfast.     MEDICATIONS  (STANDING):  budesonide  80 MICROgram(s)/formoterol 4.5 MICROgram(s) Inhaler 2 Puff(s) Inhalation two times a day  dexAMETHasone     Tablet 2 milliGRAM(s) Oral every 8 hours  folic acid 1 milliGRAM(s) Oral daily  levETIRAcetam 500 milliGRAM(s) Oral two times a day  multivitamin 1 Tablet(s) Oral daily  mupirocin 2% Ointment 1 Application(s) Both Nostrils two times a day  nicotine -   7 mG/24Hr(s) Patch 1 Patch Transdermal daily  pantoprazole    Tablet 40 milliGRAM(s) Oral before breakfast  tiotropium 2.5 MICROgram(s) Inhaler 2 Puff(s) Inhalation daily    MEDICATIONS  (PRN):  acetaminophen     Tablet .. 650 milliGRAM(s) Oral every 6 hours PRN Temp greater or equal to 38C (100.4F), Mild Pain (1 - 3)      CAPILLARY BLOOD GLUCOSE      POCT Blood Glucose.: 133 mg/dL (12 May 2023 21:56)  POCT Blood Glucose.: 99 mg/dL (12 May 2023 09:08)  POCT Blood Glucose.: 116 mg/dL (12 May 2023 07:35)    I&O's Summary      PHYSICAL EXAM:  Vital Signs Last 24 Hrs  T(C): 36.6 (14 May 2023 06:50), Max: 36.8 (13 May 2023 21:11)  T(F): 97.8 (14 May 2023 06:50), Max: 98.3 (13 May 2023 21:11)  HR: 68 (14 May 2023 06:50) (68 - 71)  BP: 117/69 (14 May 2023 06:50) (105/68 - 117/69)  BP(mean): --  RR: 17 (14 May 2023 06:50) (17 - 18)  SpO2: 100% (14 May 2023 06:50) (98% - 100%)    Parameters below as of 14 May 2023 06:50  Patient On (Oxygen Delivery Method): room air            CONSTITUTIONAL: NAD, well-developed  HEENT: on RA   RESPIRATORY: Normal respiratory effort; lungs are clear to auscultation bilaterally  CARDIOVASCULAR: Regular rate and rhythm, normal S1 and S2, no murmur/rub/gallop; No lower extremity edema; Peripheral pulses are 2+ bilaterally  ABDOMEN: Nontender to palpation, normoactive bowel sounds, no rebound/guarding; No hepatosplenomegaly  MUSCULOSKELETAL: no clubbing or cyanosis of digits; no joint swelling or tenderness to palpation  PSYCH: AOx2 (self and location- near a rehab)  NEURO: 5/5 b/l UE/LE strength, able to sense light touch everywhere, CN2-12 intact     LABS:                         13.7   8.77  )-----------( 166      ( 13 May 2023 06:00 )             38.2     05-13    137  |  102  |  35<H>  ----------------------------<  99  4.2   |  25  |  0.97    Ca    9.2      13 May 2023 06:00  Phos  3.7     05-13  Mg     1.90     05-13    TPro  6.0  /  Alb  3.4  /  TBili  0.5  /  DBili  x   /  AST  15  /  ALT  16  /  AlkPhos  75  05-13

## 2023-05-17 NOTE — PROGRESS NOTE ADULT - PROBLEM SELECTOR PLAN 1
Pt presented to OSH w/ R sided weakness/numbness, found to have Left parietal mass w/ vasogenic edema on CT head. MRI head with multiple masses, concerning for mets. s/p hilar lymph node EBUS 5/5 consistent NSLCL. Oncology at [facility] consulted and plan for chemo pending RT. Transferred to [facility] for whole brain RT    - rad onc consulted and will perform whole brain radiation 5/15-5/19  - Pt started on decadron at [facility] for vasogenic; s/p 2mg q8 hr  - continue keppra 500mg BID  - per Rad Onc, Decadron 4mg PO BID for the week Pt presented to OSH w/ R sided weakness/numbness, found to have Left parietal mass w/ vasogenic edema on CT head. MRI head with multiple masses, concerning for mets. s/p hilar lymph node EBUS 5/5 consistent NSLCL. Oncology at [facility] consulted and plan for chemo pending RT. Transferred to [facility] for whole brain RT    - rad onc consulted and will perform whole brain radiation 5/15-5/19  - Pt started on decadron at [facility] for vasogenic; s/p 2mg q8 hr  - continue keppra 500mg BID  - per Rad Onc, Decadron 4mg PO BID for the week  - per Rad-onc, Monday 5/22 decrease decadron 2BID, Thursday 5/25 1 BID, then taper off

## 2023-05-17 NOTE — PROGRESS NOTE ADULT - PROBLEM SELECTOR PLAN 6
DVT: SCDs, in setting of brain mass  Diet: Regular  Dispo: pending clinical improvement; PT at [facility] recommended HO after discharge. Patient currently lives alone so further discussions regarding LTC facility vs safer living arrangement will need to be discussed prior to discharge. PT at [facility] recommending outpt PT.  GOC: Pt seen by palliative care and ethics at [facility]. HCP is sister

## 2023-05-18 NOTE — PROGRESS NOTE ADULT - PROBLEM SELECTOR PLAN 1
Pt presented to OSH w/ R sided weakness/numbness, found to have Left parietal mass w/ vasogenic edema on CT head. MRI head with multiple masses, concerning for mets. s/p hilar lymph node EBUS 5/5 consistent NSLCL. Oncology at [facility] consulted and plan for chemo pending RT. Transferred to [facility] for whole brain RT    - rad onc consulted and will perform whole brain radiation 5/15-5/19  - Pt started on decadron at [facility] for vasogenic; s/p 2mg q8 hr  - continue keppra 500mg BID  - per Rad Onc, Decadron 4mg PO BID for the week  - per Rad-onc, Monday 5/22 decrease decadron 2BID, Thursday 5/25 1 BID, then taper off

## 2023-05-18 NOTE — PROGRESS NOTE ADULT - SUBJECTIVE AND OBJECTIVE BOX
INTERVAL HPI/OVERNIGHT EVENTS:    SUBJECTIVE: Patient seen and examined at bedside.         OBJECTIVE:    VITAL SIGNS:  ICU Vital Signs Last 24 Hrs  T(C): 36.7 (18 May 2023 05:10), Max: 36.7 (18 May 2023 05:10)  T(F): 98 (18 May 2023 05:10), Max: 98 (18 May 2023 05:10)  HR: 68 (18 May 2023 05:10) (68 - 87)  BP: 116/68 (18 May 2023 05:10) (113/61 - 116/68)  BP(mean): --  ABP: --  ABP(mean): --  RR: 17 (18 May 2023 05:10) (17 - 18)  SpO2: 99% (18 May 2023 05:10) (97% - 99%)    O2 Parameters below as of 18 May 2023 05:10  Patient On (Oxygen Delivery Method): room air            Plateau pressure:   P/F ratio:     05-17 @ 07:01  -  05-18 @ 07:00  --------------------------------------------------------  IN: 700 mL / OUT: 0 mL / NET: 700 mL      CAPILLARY BLOOD GLUCOSE        CONSTITUTIONAL: NAD, well-developed  HEENT: on RA   RESPIRATORY: Normal respiratory effort; lungs are clear to auscultation bilaterally  CARDIOVASCULAR: Regular rate and rhythm, normal S1 and S2, no murmur/rub/gallop; No lower extremity edema; Peripheral pulses are 2+ bilaterally  ABDOMEN: Nontender to palpation, normoactive bowel sounds, no rebound/guarding; No hepatosplenomegaly  MUSCULOSKELETAL: no clubbing or cyanosis of digits; no joint swelling or tenderness to palpation  PSYCH: AOx2 (self and location- near a rehab)  NEURO: 5/5 b/l UE/LE strength, able to sense light touch everywhere, CN2-12 intact     MEDICATIONS:  MEDICATIONS  (STANDING):  budesonide  80 MICROgram(s)/formoterol 4.5 MICROgram(s) Inhaler 2 Puff(s) Inhalation two times a day  chlorhexidine 2% Cloths 1 Application(s) Topical daily  dexAMETHasone     Tablet 4 milliGRAM(s) Oral two times a day  folic acid 1 milliGRAM(s) Oral daily  levETIRAcetam 500 milliGRAM(s) Oral two times a day  multivitamin 1 Tablet(s) Oral daily  nicotine -   7 mG/24Hr(s) Patch 1 Patch Transdermal daily  pantoprazole    Tablet 40 milliGRAM(s) Oral before breakfast  tiotropium 2.5 MICROgram(s) Inhaler 2 Puff(s) Inhalation daily    MEDICATIONS  (PRN):  acetaminophen     Tablet .. 650 milliGRAM(s) Oral every 6 hours PRN Temp greater or equal to 38C (100.4F), Mild Pain (1 - 3)      LABS:                        14.0   9.10  )-----------( 133      ( 17 May 2023 06:01 )             39.4     05-17    137  |  99  |  33<H>  ----------------------------<  100<H>  4.3   |  27  |  0.96    Ca    9.4      17 May 2023 06:01  Phos  3.4     05-17  Mg     2.00     05-17    TPro  6.2  /  Alb  3.5  /  TBili  0.4  /  DBili  x   /  AST  16  /  ALT  17  /  AlkPhos  88  05-17          RADIOLOGY & ADDITIONAL TESTS: Reviewed.     INTERVAL HPI/OVERNIGHT EVENTS:    SUBJECTIVE: Patient seen and examined at bedside. No overnight events. Radiation in AM. Denies headache, nausea, dizziness.     OBJECTIVE:    VITAL SIGNS:  ICU Vital Signs Last 24 Hrs  T(C): 36.7 (18 May 2023 05:10), Max: 36.7 (18 May 2023 05:10)  T(F): 98 (18 May 2023 05:10), Max: 98 (18 May 2023 05:10)  HR: 68 (18 May 2023 05:10) (68 - 87)  BP: 116/68 (18 May 2023 05:10) (113/61 - 116/68)  BP(mean): --  ABP: --  ABP(mean): --  RR: 17 (18 May 2023 05:10) (17 - 18)  SpO2: 99% (18 May 2023 05:10) (97% - 99%)    O2 Parameters below as of 18 May 2023 05:10  Patient On (Oxygen Delivery Method): room air            Plateau pressure:   P/F ratio:     05-17 @ 07:01  -  05-18 @ 07:00  --------------------------------------------------------  IN: 700 mL / OUT: 0 mL / NET: 700 mL      CAPILLARY BLOOD GLUCOSE        CONSTITUTIONAL: NAD, well-developed  HEENT: on RA   RESPIRATORY: Normal respiratory effort; lungs are clear to auscultation bilaterally  CARDIOVASCULAR: Regular rate and rhythm, normal S1 and S2, no murmur/rub/gallop; No lower extremity edema; Peripheral pulses are 2+ bilaterally  ABDOMEN: Nontender to palpation, normoactive bowel sounds, no rebound/guarding; No hepatosplenomegaly  MUSCULOSKELETAL: no clubbing or cyanosis of digits; no joint swelling or tenderness to palpation  PSYCH: AOx2 (self and location- near a rehab)  NEURO: 5/5 b/l UE/LE strength, able to sense light touch everywhere, CN2-12 intact     MEDICATIONS:  MEDICATIONS  (STANDING):  budesonide  80 MICROgram(s)/formoterol 4.5 MICROgram(s) Inhaler 2 Puff(s) Inhalation two times a day  chlorhexidine 2% Cloths 1 Application(s) Topical daily  dexAMETHasone     Tablet 4 milliGRAM(s) Oral two times a day  folic acid 1 milliGRAM(s) Oral daily  levETIRAcetam 500 milliGRAM(s) Oral two times a day  multivitamin 1 Tablet(s) Oral daily  nicotine -   7 mG/24Hr(s) Patch 1 Patch Transdermal daily  pantoprazole    Tablet 40 milliGRAM(s) Oral before breakfast  tiotropium 2.5 MICROgram(s) Inhaler 2 Puff(s) Inhalation daily    MEDICATIONS  (PRN):  acetaminophen     Tablet .. 650 milliGRAM(s) Oral every 6 hours PRN Temp greater or equal to 38C (100.4F), Mild Pain (1 - 3)      LABS:                        14.0   9.10  )-----------( 133      ( 17 May 2023 06:01 )             39.4     05-17    137  |  99  |  33<H>  ----------------------------<  100<H>  4.3   |  27  |  0.96    Ca    9.4      17 May 2023 06:01  Phos  3.4     05-17  Mg     2.00     05-17    TPro  6.2  /  Alb  3.5  /  TBili  0.4  /  DBili  x   /  AST  16  /  ALT  17  /  AlkPhos  88  05-17          RADIOLOGY & ADDITIONAL TESTS: Reviewed.

## 2023-05-18 NOTE — PROGRESS NOTE ADULT - PROBLEM SELECTOR PLAN 6
DVT: SCDs, in setting of brain mass  Diet: Regular  Dispo: pending clinical improvement; PT at [facility] recommended HO after discharge. Patient currently lives alone so further discussions regarding LTC facility vs safer living arrangement will need to be discussed prior to discharge. PT at [facility] recommending outpt PT.  GOC: Pt seen by palliative care and ethics at [facility]. HCP is sister DVT: SCDs, in setting of brain mass  Diet: Regular  Dispo: pending clinical improvement; PT at [facility] recommended HO after discharge. Patient currently lives alone so further discussions regarding LTC facility vs safer living arrangement will need to be discussed prior to discharge. PT at [facility] recommending outpt PT.  GOC: Pt seen by palliative care and ethics at [facility]. HCP is sister  PT: OP PT

## 2023-05-19 NOTE — PROGRESS NOTE ADULT - PROBLEM SELECTOR PROBLEM 4
Alcohol abuse, daily use

## 2023-05-19 NOTE — PROGRESS NOTE ADULT - NUTRITIONAL ASSESSMENT
This patient has been assessed with a concern for Malnutrition and has been determined to have a diagnosis/diagnoses of Severe protein-calorie malnutrition.    This patient is being managed with:   Diet Regular-  Supplement Feeding Modality:  Oral  Ensure Plus High Protein Cans or Servings Per Day:  1       Frequency:  Two Times a day  Entered: May 12 2023  5:39PM  

## 2023-05-19 NOTE — PROGRESS NOTE ADULT - PROVIDER SPECIALTY LIST ADULT
Heme/Onc
Internal Medicine

## 2023-05-19 NOTE — PROGRESS NOTE ADULT - PROBLEM SELECTOR PROBLEM 1
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

## 2023-05-19 NOTE — PROGRESS NOTE ADULT - SUBJECTIVE AND OBJECTIVE BOX
INTERVAL HPI/OVERNIGHT EVENTS:    SUBJECTIVE: Patient seen and examined at bedside. No overnight events. Radiation in AM. Denies headache, nausea, dizziness.     OBJECTIVE:    VITAL SIGNS:  ICU Vital Signs Last 24 Hrs  T(C): 36.7 (18 May 2023 05:10), Max: 36.7 (18 May 2023 05:10)  T(F): 98 (18 May 2023 05:10), Max: 98 (18 May 2023 05:10)  HR: 68 (18 May 2023 05:10) (68 - 87)  BP: 116/68 (18 May 2023 05:10) (113/61 - 116/68)  BP(mean): --  ABP: --  ABP(mean): --  RR: 17 (18 May 2023 05:10) (17 - 18)  SpO2: 99% (18 May 2023 05:10) (97% - 99%)    O2 Parameters below as of 18 May 2023 05:10  Patient On (Oxygen Delivery Method): room air            Plateau pressure:   P/F ratio:     05-17 @ 07:01  -  05-18 @ 07:00  --------------------------------------------------------  IN: 700 mL / OUT: 0 mL / NET: 700 mL      CAPILLARY BLOOD GLUCOSE        CONSTITUTIONAL: NAD, well-developed  HEENT: on RA   RESPIRATORY: Normal respiratory effort; lungs are clear to auscultation bilaterally  CARDIOVASCULAR: Regular rate and rhythm, normal S1 and S2, no murmur/rub/gallop; No lower extremity edema; Peripheral pulses are 2+ bilaterally  ABDOMEN: Nontender to palpation, normoactive bowel sounds, no rebound/guarding; No hepatosplenomegaly  MUSCULOSKELETAL: no clubbing or cyanosis of digits; no joint swelling or tenderness to palpation  PSYCH: AOx2 (self and location- near a rehab)  NEURO: 5/5 b/l UE/LE strength, able to sense light touch everywhere, CN2-12 intact     MEDICATIONS:  MEDICATIONS  (STANDING):  budesonide  80 MICROgram(s)/formoterol 4.5 MICROgram(s) Inhaler 2 Puff(s) Inhalation two times a day  chlorhexidine 2% Cloths 1 Application(s) Topical daily  dexAMETHasone     Tablet 4 milliGRAM(s) Oral two times a day  folic acid 1 milliGRAM(s) Oral daily  levETIRAcetam 500 milliGRAM(s) Oral two times a day  multivitamin 1 Tablet(s) Oral daily  nicotine -   7 mG/24Hr(s) Patch 1 Patch Transdermal daily  pantoprazole    Tablet 40 milliGRAM(s) Oral before breakfast  tiotropium 2.5 MICROgram(s) Inhaler 2 Puff(s) Inhalation daily    MEDICATIONS  (PRN):  acetaminophen     Tablet .. 650 milliGRAM(s) Oral every 6 hours PRN Temp greater or equal to 38C (100.4F), Mild Pain (1 - 3)      LABS:                        14.0   9.10  )-----------( 133      ( 17 May 2023 06:01 )             39.4     05-17    137  |  99  |  33<H>  ----------------------------<  100<H>  4.3   |  27  |  0.96    Ca    9.4      17 May 2023 06:01  Phos  3.4     05-17  Mg     2.00     05-17    TPro  6.2  /  Alb  3.5  /  TBili  0.4  /  DBili  x   /  AST  16  /  ALT  17  /  AlkPhos  88  05-17          RADIOLOGY & ADDITIONAL TESTS: Reviewed.

## 2023-05-19 NOTE — PROGRESS NOTE ADULT - REASON FOR ADMISSION
Radiation therapy

## 2023-05-19 NOTE — PROGRESS NOTE ADULT - PROBLEM SELECTOR PROBLEM 5
Nicotine dependence

## 2023-05-19 NOTE — DISCHARGE NOTE NURSING/CASE MANAGEMENT/SOCIAL WORK - PATIENT PORTAL LINK FT
You can access the FollowMyHealth Patient Portal offered by [facility] by registering at the following website: [email or web address]. By joining [employer]’s FollowMyHealth portal, you will also be able to view your health information using other applications (apps) compatible with our system.

## 2023-05-19 NOTE — PROGRESS NOTE ADULT - ATTENDING COMMENTS
Pt seen examined and d/w fellow. History confirmed with pt at bedside. Recent onset R sides weakness - admitted and found to have multiple CNS mets with associated vasogenic edema as noted above and pulm finding suspicious for lung cancer - LN bx confirmed NSCLCa - molecular marker studies pending.  Started on dex and has regained R sided strength> also h/o EtOH a s above confirmed y pt. Currently w/o complaint s, good appetite and ambulating in hallway pe rpt. PE Sclerae anicteric Lungs clear Heart RRR S1S2 Abd NABS soft / NT no HSM / masses Ext w/o edema, Neuro grossly nonfocal A/P Met NSCLCa with CNS mets / vasogenic edema - symptomatically improved with dex. Will benefit from WBRT - per rad onc. Pt is aware he has met lung cancer and is interested in pall treatment options. Will await molecular markers to help determine optimal treatment approach - will also need to work with pt / family re best venue.
77M smoker with PMH of alcohol use disorder and dementia, recent dx of NSCLC with brain mets admitted for inpt WBRT  -completed inpatient RT 5/15-19  -c/w  decadron 4 bid.  plan to taper once complete treatment as per rad-onc recs  -c/w Keppra for seizure ppx    stable for discharge home today. total time spent on dc 37min
patient AAOx2 to name and hospital. no acute complaints. denies numbness/weakness of his upper extremities.    #NSCLC with brain metastases- plan for inpatient RT this week. on decadron 2mg q8h, can decrease dose once started on treatment. c/w Keppra for seizure ppx.     d/w Dr. Chicas
77M smoker with PMH of alcohol use disorder and dementia, recent dx of NSCLC with brain mets admitted for inpt WBRT  -plan for inpatient RT 5/15-19  -c/w  decadron 4 bid.  plan to taper once complete treatment.   -c/w Keppra for seizure ppx.
77M smoker with PMH of alcohol use disorder and dementia, recent dx of NSCLC with brain mets admitted for inpt WBRT  -plan for inpatient RT 5/15-19  -c/w  decadron 4 bid.  plan to taper once complete treatment. will f/u rad-onc recs  -c/w Keppra for seizure ppx.
77M smoker with PMH of alcohol use disorder and dementia, recent dx of NSCLC with brain mets admitted for inpt WBRT  -plan for inpatient RT 5/15-19  -c/w  decadron 4 bid.  plan to taper once complete treatment as per rad-onc recs  -c/w Keppra for seizure ppx.
77M smoker with PMH of alcohol use disorder and dementia, recent dx of NSCLC with brain mets admitted for inpt WBRT  -plan for inpatient RT 5/15-19  -c/w  decadron 4 bid.  plan to taper once complete treatment.   -c/w Keppra for seizure ppx.
patient AAOx2 to name and hospital. no acute complaints. denies numbness/weakness of his upper extremities.    #NSCLC with brain metastases- plan for inpatient RT next week. on decadron 2mg q8h, can decrease dose once started on treatment. c/w keppra for seizure ppx.     d/w Dr. Duarte
77 year old male with Hx of ETOH use disorder and recently NSCLC w/ mets to the brain transferred from OSH for RT. Admitted to medicine for further management while undergoing radiation.   # Metastatic NSCLC   - CT Head at admission: 2.4 x 2.0 x 2.6 cm left frontal/parietal mass with vasogenic edema   - MRI confirming multiple ring enhancing lesions in b/l frontal, left temporal lobes.   - CT C/A/P showing: clusters of nodules in the LLL, mediastinal LAD, paratracheal LAD, right hilar LAD.   s/p EBUS 5/5 consistent with NSLCL   - Neurosurgery consulted: no intervention planned inpatient   - started on decadron; now being tapered, currently on 2 mg q 8 hours   -c/w keppra BID   - Transferred to [facility] for inpatient WBRT- f/up [facility] recs.     Rest of the plan as above. Discussed with HS.

## 2023-05-19 NOTE — DISCHARGE NOTE NURSING/CASE MANAGEMENT/SOCIAL WORK - NSDCPEFALRISK_GEN_ALL_CORE
For information on Fall & Injury Prevention, visit: [email or web address] OR  [email or web address] OR  https://www.cdc.gov/steadi/patient.html

## 2023-05-19 NOTE — PROGRESS NOTE ADULT - PROBLEM SELECTOR PLAN 1
Pt presented to OSH w/ R sided weakness/numbness, found to have Left parietal mass w/ vasogenic edema on CT head. MRI head with multiple masses, concerning for mets. s/p hilar lymph node EBUS 5/5 consistent NSLCL. Oncology at [facility] consulted and plan for chemo pending RT. Transferred to [facility] for whole brain RT    - rad onc consulted and will perform whole brain radiation 5/15-5/19  - Pt started on decadron at [facility] for vasogenic; s/p 2mg q8 hr  - continue keppra 500mg BID  - per Rad Onc, Decadron 4mg PO BID for the week  - per Rad-onc, Monday 5/22 decrease decadron 2BID, Thursday 5/25 1 BID, then taper off

## 2023-05-19 NOTE — PROGRESS NOTE ADULT - PROBLEM SELECTOR PLAN 6
DVT: SCDs, in setting of brain mass  Diet: Regular  Dispo: pending clinical improvement; PT at [facility] recommended HO after discharge. Patient currently lives alone so further discussions regarding LTC facility vs safer living arrangement will need to be discussed prior to discharge. PT at [facility] recommending outpt PT.  GOC: Pt seen by palliative care and ethics at [facility]. HCP is sister  PT: OP PT

## 2023-05-19 NOTE — PROGRESS NOTE ADULT - PROBLEM SELECTOR PLAN 5
daily cigarette use  - start nicotine patch.

## 2023-05-19 NOTE — PROGRESS NOTE ADULT - PROBLEM/PLAN-5
DISPLAY PLAN FREE TEXT
Oxybutynin Pregnancy And Lactation Text: This medication is Pregnancy Category B and is considered safe during pregnancy. It is unknown if it is excreted in breast milk.
DISPLAY PLAN FREE TEXT

## 2023-05-23 NOTE — CHART NOTE - NSCHARTNOTESELECT_GEN_ALL_CORE
Discharge Appointments/Event Note
Event Note
d/c appt/Event Note
rad onc/Event Note
Inpatient Referrals/Event Note
Nutrition Services

## 2023-05-23 NOTE — CHART NOTE - NSCHARTNOTEFT_GEN_A_CORE
Left voicemail to return our call
Pt seen for severe malnutrition follow up     Medical Course: Per chart 77 year old Male with ETOH use disorder and recently NSCLC w/ mets to the brain transferred from OSH for RT. Admitted to medicine for further management while undergoing radiation 5/15-5/19.    Nutrition Course: Pt A&Ox3, forgetful at times. Participating in interview and answering all question appropriately. Pt reports good intake of meals. Per RN flowsheets intake 25-75%. Observed Pt consuming 100% of Ensure Plus at bedside. Denies any nausea, vomiting, diarrhea, constipation. Last BM 5/16 per RN flowsheets. Not on bowel regimen. No chewing/swallowing difficulties. No nutrition related questions at this time. Assisted Pt with menu selection.     Diet Prescription: Diet, Regular:   Supplement Feeding Modality:  Oral  Ensure Plus High Protein Cans or Servings Per Day:  1       Frequency:  Two Times a day (05-12-23 @ 17:39)    Pertinent Medications: MEDICATIONS  (STANDING):  budesonide  80 MICROgram(s)/formoterol 4.5 MICROgram(s) Inhaler 2 Puff(s) Inhalation two times a day  chlorhexidine 2% Cloths 1 Application(s) Topical daily  dexAMETHasone     Tablet 4 milliGRAM(s) Oral two times a day  folic acid 1 milliGRAM(s) Oral daily  levETIRAcetam 500 milliGRAM(s) Oral two times a day  multivitamin 1 Tablet(s) Oral daily  nicotine -   7 mG/24Hr(s) Patch 1 Patch Transdermal daily  pantoprazole    Tablet 40 milliGRAM(s) Oral before breakfast  tiotropium 2.5 MICROgram(s) Inhaler 2 Puff(s) Inhalation daily    MEDICATIONS  (PRN):  acetaminophen     Tablet .. 650 milliGRAM(s) Oral every 6 hours PRN Temp greater or equal to 38C (100.4F), Mild Pain (1 - 3)    Pertinent Labs: 05-18 Na137 mmol/L Glu 101 mg/dL<H> K+ 4.1 mmol/L Cr  0.80 mg/dL BUN 29 mg/dL<H> 05-17 Phos 3.4 mg/dL 05-18 Alb 3.4 g/dL    Weight: Height (cm): 170.2 (05-12 @ 02:00), 170.2 (05-05 @ 08:00), 170.2 (04-23 @ 19:34)  Weight (kg): 55.4 (05-12 @ 02:00), 55 (05-05 @ 08:00), 68 (04-23 @ 19:34)  BMI (kg/m2): 19.1 (05-12 @ 02:00), 19 (05-05 @ 08:00), 23.5 (04-23 @ 19:34)  Weight Assessment: No new weight to assess       Physical Assessment, per flowsheets:  Edema: No edema noted per RN flowsheets   Skin: Intact w/ no pressure injuries noted per RN flowsheets       Estimated Needs:   [X] No change since previous assessment    Previous Nutrition Diagnosis: severe protein calorie malnutrition   Nutrition Diagnosis is [ x] ongoing    New Nutrition Diagnosis: [ x] not applicable     Interventions:   1) Recommend continue regular diet   2) Continue Ensure Plus High Protein 1 PO Twice Daily (700 kcal, 40 gm protein)   3) Obtain weekly weights   4) Encourage PO intake and honor food preferences as able.   5) Continue Multivitamin once daily.   6) RD available prn    Monitor & Evaluate:  PO intake, tolerance to diet/supplement, nutrition related lab values, weight trends, BMs/GI distress, hydration status, skin integrity.    Cherie [place], [place]| [id number] (Also available on TEAMS)
Left voicemail to return our call
Mr. Camara, 77y.o. M with metastatic NSCLC after LN biopsy done at [facility], also having intracranial brain metastases  was transferred to [facility] and planned to receive WBRT, 5 fractions/ 20gy.    Schedule shows: 5/15 - 5/19/23 WBRT.  Would advised remains on decadron 4mg po BID for the week.
Patient requested call back on 5/19.
Pt had simulation CT for RT today, is scheduled to have RT daily M-F next week.

## 2023-05-24 PROBLEM — C34.90 MALIGNANT NEOPLASM OF UNSPECIFIED PART OF UNSPECIFIED BRONCHUS OR LUNG: Chronic | Status: ACTIVE | Noted: 2023-01-01

## 2023-05-29 PROBLEM — Z87.891 HISTORY OF TOBACCO ABUSE: Status: RESOLVED | Noted: 2023-01-01 | Resolved: 2023-01-01

## 2023-05-29 PROBLEM — F17.200 HEAVY TOBACCO SMOKER: Status: ACTIVE | Noted: 2023-01-01

## 2023-05-29 PROBLEM — F10.10 ALCOHOL ABUSE: Status: RESOLVED | Noted: 2023-01-01 | Resolved: 2023-01-01

## 2023-05-29 PROBLEM — I10 HYPERTENSION, UNSPECIFIED TYPE: Status: ACTIVE | Noted: 2023-01-01

## 2023-05-29 PROBLEM — Z78.9 HEAVY ALCOHOL USE: Status: ACTIVE | Noted: 2023-01-01

## 2023-05-30 PROBLEM — Z80.49 FAMILY HISTORY OF MALIGNANT NEOPLASM OF UTERUS: Status: ACTIVE | Noted: 2023-01-01

## 2023-05-30 PROBLEM — C79.31 METASTASIS TO BRAIN: Status: ACTIVE | Noted: 2023-01-01

## 2023-05-30 NOTE — REVIEW OF SYSTEMS
[Patient Intake Form Reviewed] : Patient intake form was reviewed [Diarrhea: Grade 0] : Diarrhea: Grade 0 [Negative] : Allergic/Immunologic [Fatigue] : fatigue [Recent Change In Weight] : ~T recent weight change

## 2023-05-30 NOTE — HISTORY OF PRESENT ILLNESS
[Disease: _____________________] : Disease: [unfilled] [M: ___] : M[unfilled] [AJCC Stage: ____] : AJCC Stage: [unfilled] [de-identified] : 77M, of [place] descent ( born in [place]), heavy tobacco smoker, PMH COPD, HTN, chronic alcohol abuse, memory loss, referred for radical oncology consultation of lung cancer.  \par \par CASE SYNOPSIS:\par 4/23/2023 CT head no contrast: Left frontoparietal mass with marked vasogenic edema; further evaluation with contrast–enhanced MRI recommended.  Started on dexamethasone and Keppra.\par \par 4/23/2023 CT brain perfusion/CT angiogram head and neck: Abnormal perfusion left cerebral white matter, most likely related to vasogenic edema.  Left vertebral artery not visualized until approximately C4 level, diminutive in size and attenuated, likely related to occlusion/severe stenosis.  No CTA evidence of aneurysm or dissection.\par \par 4/24/2023 CT C/A/P: Mediastinal and right hilar lymphadenopathy.  Nonspecific clustered nodules left lower lobe.  No evidence of metastatic disease within the chest or abdomen.  Heterogeneous vertebral body appearance which could be due to underlying degenerative changes.  Correlation with nuclear bone scan may be of benefit.\par \par 4/24/2023 MRI brain: Multiple enhancing masses within the brain, largest ring-enhancing mass measuring 2.9 x 2.7 x 3.0 cm in the left periatrial lobe with moderate surrounding edema which demonstrates mass effect on the posterior horn of the left lateral ventricle.  A 1.4 cm lesion is medially with mild surrounding edema and a 9 mm ring-enhancing lesion is seen in the right occipital cortex with minimal edema.  Mild periventricular white matter ischemia noted with old infarctions in the left basal ganglia.\par \par 5/5/2023 EBUS–guided FNA LN level 4R: Pathology positive for MOC-31, TTF-1, CK7 and negative for CDX2, CK20, synaptophysin and chromogranin c/w metastatic non-small cell carcinoma, favor adenocarcinoma of primary pulmonary origin.  PD-L1 TPS 0%.\par \par  5/15-5/19/23: WBRT, 20 cGy/5 fractions\par \par Patient with longstanding history of tobacco and alcohol use, single, noncompliant with medical follow-up, recently diagnosed with metastatic lung cancer (brain,?  bone).\par  [de-identified] : Favor adenocarcinoma [de-identified] : PD-L1 TPS 0%

## 2023-05-30 NOTE — ASSESSMENT
[FreeTextEntry1] : Mr. JARVIS 's questions were answered to his satisfaction. \par He  expressed his  understanding and willingness to comply with the above recommendations, and  will return to the office after completion of above tests.\par \par \par \par

## 2023-05-30 NOTE — REASON FOR VISIT
[Initial Consultation] : an initial consultation [Family Member] : family member [FreeTextEntry2] : metastatic lung cancer

## 2023-05-30 NOTE — CONSULT LETTER
[Consult Letter:] : I had the pleasure of evaluating your patient, [unfilled]. [Please see my note below.] : Please see my note below. [Consult Closing:] : Thank you very much for allowing me to participate in the care of this patient.  If you have any questions, please do not hesitate to contact me. [Sincerely,] : Sincerely, [Dear  ___] : Dear  [unfilled], [FreeTextEntry2] : Kaia Baxter MD [FreeTextEntry3] : AARON OLSON M.D.\par Medical Oncology\par [facility] \par [facility]\par [address]\par [place], [place] [address]\par Telephone: [phone]\par Fax: [phone]\par \par \par \par \par \par

## 2023-06-06 NOTE — HISTORY OF PRESENT ILLNESS
[Disease: _____________________] : Disease: [unfilled] [M: ___] : M[unfilled] [AJCC Stage: ____] : AJCC Stage: [unfilled] [de-identified] : 77M, of [place] descent ( born in [place]), heavy tobacco smoker, PMH COPD, HTN, chronic alcohol abuse, memory loss, referred for radical oncology consultation of lung cancer.  \par \par CASE SYNOPSIS:\par 4/23/2023 CT head no contrast: Left frontoparietal mass with marked vasogenic edema; further evaluation with contrast–enhanced MRI recommended.  Started on dexamethasone and Keppra.\par \par 4/23/2023 CT brain perfusion/CT angiogram head and neck: Abnormal perfusion left cerebral white matter, most likely related to vasogenic edema.  Left vertebral artery not visualized until approximately C4 level, diminutive in size and attenuated, likely related to occlusion/severe stenosis.  No CTA evidence of aneurysm or dissection.\par \par 4/24/2023 CT C/A/P: Mediastinal and right hilar lymphadenopathy.  Nonspecific clustered nodules left lower lobe.  No evidence of metastatic disease within the chest or abdomen.  Heterogeneous vertebral body appearance which could be due to underlying degenerative changes.  Correlation with nuclear bone scan may be of benefit.\par \par 4/24/2023 MRI brain: Multiple enhancing masses within the brain, largest ring-enhancing mass measuring 2.9 x 2.7 x 3.0 cm in the left periatrial lobe with moderate surrounding edema which demonstrates mass effect on the posterior horn of the left lateral ventricle.  A 1.4 cm lesion is medially with mild surrounding edema and a 9 mm ring-enhancing lesion is seen in the right occipital cortex with minimal edema.  Mild periventricular white matter ischemia noted with old infarctions in the left basal ganglia.\par \par 5/5/2023 EBUS–guided FNA LN level 4R: Pathology positive for MOC-31, TTF-1, CK7 and negative for CDX2, CK20, synaptophysin and chromogranin c/w metastatic non-small cell carcinoma, favor adenocarcinoma of primary pulmonary origin.  PD-L1 TPS 0%.\par \par  5/15-5/19/23: WBRT, 20 cGy/5 fractions\par \par  [de-identified] : Favor adenocarcinoma [de-identified] : PD-L1 TPS 0% [FreeTextEntry1] : plan systemic chemotherapy with pemetrexed/carboplatin/pembrolizumab [de-identified] : Mr. Camara is here to start treatment with B12 in anticipation of the use of pemetrexed next week.  He is due for cycle 1 systemic chemotherapy with pemetrexed/carboplatin AUC 5 and pembrolizumab every 21 days for total of 6 cycles, followed by pembrolizumab maintenance 200 mg every 3 weeks x 9.  Accompanied by his sister, who is helping with transportation.  Patient appears at times confused, but signed up consent for systemic therapy.  He continues to live alone and insists he is self-sufficient; his sister, Michelle, wants a social work evaluation, as she anticipates need for help with transportation and home care assistance.\par Patient hematologic profile reviewed–unremarkable.  We will continue to supplement vitamin B12 at 9 weeks interval.\par Patient advised to stop tobacco and alcohol use during treatment.\par \par

## 2023-06-06 NOTE — PHYSICAL EXAM
[Capable of only limited self care, confined to bed or chair more than 50% of waking hours] : Status 3- Capable of only limited self care, confined to bed or chair more than 50% of waking hours [Normal] : normal appearance, no rash, nodules, vesicles, ulcers, erythema

## 2023-06-06 NOTE — REASON FOR VISIT
[Follow-Up Visit] : a follow-up [Family Member] : family member [FreeTextEntry2] : metastatic lung cancer

## 2023-06-06 NOTE — REVIEW OF SYSTEMS
[Fatigue] : fatigue [Recent Change In Weight] : ~T recent weight change [Diarrhea: Grade 0] : Diarrhea: Grade 0 [Negative] : Endocrine

## 2023-06-06 NOTE — ASSESSMENT
[FreeTextEntry1] : Mr. JARVIS 's questions were answered to his satisfaction. \par He  expressed his  understanding and willingness to comply with the above recommendations, and  will return to the office in 3 weeks\par \par \par

## 2023-06-23 PROBLEM — J43.9 COPD WITH EMPHYSEMA: Status: ACTIVE | Noted: 2023-01-01

## 2023-06-23 PROBLEM — Z78.9 CURRENT NON-SMOKER: Status: ACTIVE | Noted: 2023-01-01

## 2023-06-23 PROBLEM — Z78.9 CURRENT NON-DRINKER OF ALCOHOL: Status: ACTIVE | Noted: 2023-01-01

## 2023-06-23 PROBLEM — J44.9 COPD, SEVERE: Status: ACTIVE | Noted: 2023-01-01

## 2023-06-23 PROBLEM — Z82.49 FAMILY HISTORY OF HYPERTENSION: Status: ACTIVE | Noted: 2023-01-01

## 2023-06-23 PROBLEM — R73.01 IMPAIRED FASTING BLOOD SUGAR: Status: RESOLVED | Noted: 2023-01-01 | Resolved: 2023-01-01

## 2023-07-03 PROBLEM — E78.5 HYPERLIPIDEMIA: Status: ACTIVE | Noted: 2023-01-01

## 2023-07-03 PROBLEM — R73.09 ELEVATED HEMOGLOBIN A1C: Status: ACTIVE | Noted: 2023-01-01

## 2023-07-07 NOTE — HISTORY OF PRESENT ILLNESS
[Disease: _____________________] : Disease: [unfilled] [M: ___] : M[unfilled] [AJCC Stage: ____] : AJCC Stage: [unfilled] [de-identified] : 77M, of [place] descent ( born in [place]), heavy tobacco smoker, PMH COPD, HTN, chronic alcohol abuse, memory loss, referred for radical oncology consultation of lung cancer.  \par \par CASE SYNOPSIS:\par 4/23/2023 CT head no contrast: Left frontoparietal mass with marked vasogenic edema; further evaluation with contrast–enhanced MRI recommended.  Started on dexamethasone and Keppra.\par \par 4/23/2023 CT brain perfusion/CT angiogram head and neck: Abnormal perfusion left cerebral white matter, most likely related to vasogenic edema.  Left vertebral artery not visualized until approximately C4 level, diminutive in size and attenuated, likely related to occlusion/severe stenosis.  No CTA evidence of aneurysm or dissection.\par \par 4/24/2023 CT C/A/P: Mediastinal and right hilar lymphadenopathy.  Nonspecific clustered nodules left lower lobe.  No evidence of metastatic disease within the chest or abdomen.  Heterogeneous vertebral body appearance which could be due to underlying degenerative changes.  Correlation with nuclear bone scan may be of benefit.\par \par 4/24/2023 MRI brain: Multiple enhancing masses within the brain, largest ring-enhancing mass measuring 2.9 x 2.7 x 3.0 cm in the left periatrial lobe with moderate surrounding edema which demonstrates mass effect on the posterior horn of the left lateral ventricle.  A 1.4 cm lesion is medially with mild surrounding edema and a 9 mm ring-enhancing lesion is seen in the right occipital cortex with minimal edema.  Mild periventricular white matter ischemia noted with old infarctions in the left basal ganglia.\par \par 5/5/2023 EBUS–guided FNA LN level 4R: Pathology positive for MOC-31, TTF-1, CK7 and negative for CDX2, CK20, synaptophysin and chromogranin c/w metastatic non-small cell carcinoma, favor adenocarcinoma of primary pulmonary origin.  PD-L1 TPS 0%.\par \par  5/15-5/19/23: WBRT, 20 cGy/5 fractions\par \par 6/15/2023 C1 D1 pemetrexed, carboplatin AUC 5, pembrolizumab 200 [de-identified] : Favor adenocarcinoma [de-identified] : PD-L1 TPS 0% [FreeTextEntry1] : plan systemic chemotherapy with pemetrexed/carboplatin/pembrolizumab [de-identified] : Mr. Camara returns for cycle #2 chemotherapy.  Appears stable, nontoxic.  Poor historian, information about the patient obtained from sister, Michelle, who remains her his main caregiver.  Patient continues to live alone.  Appetite fair, lost 4 lbs in the past month.  Hematologic picture unchanged.\par Denies GI symptoms.  Received B12 prior to the start of chemotherapy.  Evaluated by social work–refused home care.\par Continues to smoke.  Denies pain.\par \par

## 2023-07-07 NOTE — REVIEW OF SYSTEMS
[Fatigue] : fatigue [Recent Change In Weight] : ~T recent weight change [Diarrhea: Grade 0] : Diarrhea: Grade 0 [Negative] : Endocrine [FreeTextEntry4] : Edentulous

## 2023-07-07 NOTE — ASSESSMENT
[FreeTextEntry1] : Mr. JARVIS 's questions were answered to his satisfaction. \par He  expressed his  understanding and willingness to comply with the above recommendations, and  will return to the office in 6 weeks\par \par \par

## 2023-07-07 NOTE — PHYSICAL EXAM
[Capable of only limited self care, confined to bed or chair more than 50% of waking hours] : Status 3- Capable of only limited self care, confined to bed or chair more than 50% of waking hours [Normal] : normal appearance, no rash, nodules, vesicles, ulcers, erythema [Ulcers] : no ulcers [Mucositis] : no mucositis [de-identified] : edentulous

## 2023-07-13 NOTE — ED ADULT NURSE NOTE - NSFALLHARMRISKINTERV_ED_ALL_ED

## 2023-07-13 NOTE — ED PROVIDER NOTE - CLINICAL SUMMARY MEDICAL DECISION MAKING FREE TEXT BOX
76 y/o M with PMH dementia, new onset metastatic brain cancer, presenting to the ED for failure to thrive.  Vitals stable.  Per daughter, patient unable to participate in his ADLs anymore but currently undergoing chemo/radiation, she is requesting admission for HO/placement

## 2023-07-13 NOTE — ED PROVIDER NOTE - OBJECTIVE STATEMENT
76 y/o M w/ PMH of recently diagnosed NSLC with brain mets is presenting to the ED for generalized weakness and decreased appetite. Per sister, patient lives alone, A&OX2 at baseline, but has been having difficulty doing his ADLs and can no longer live independently. He denies active pain. Currently undergoing chemo/radiation per sister. Now his PMD is recommending HO or placement. Patient himself is unsure why he is at the hospital, is confused and unable to participate much in the history.

## 2023-07-13 NOTE — ED PROVIDER NOTE - PHYSICAL EXAMINATION
GENERAL: Awake, alert, thin, confused  HEENT: NC/AT, moist mucous membranes  LUNGS: CTAB, no wheezes or crackles   CARDIAC: RRR, no m/r/g  ABDOMEN: Soft, normal BS, non tender, non distended, no rebound, no guarding  EXT: No edema, no calf tenderness, 2+ DP pulses bilaterally, no deformities.  NEURO: A&Ox2. Moving all extremities.  SKIN: Warm and dry. No rash.  PSYCH: Normal affect.

## 2023-07-13 NOTE — PATIENT PROFILE ADULT - FALL HARM RISK - HARM RISK INTERVENTIONS
Assistance with ambulation/Assistance OOB with selected safe patient handling equipment/Communicate Risk of Fall with Harm to all staff/Discuss with provider need for PT consult/Monitor for mental status changes/Monitor gait and stability/Move patient closer to nurses' station/Reinforce activity limits and safety measures with patient and family/Reorient to person, place and time as needed/Tailored Fall Risk Interventions/Toileting schedule using arm’s reach rule for commode and bathroom/Use of alarms - bed, chair and/or voice tab/Visual Cue: Yellow wristband and red socks/Bed in lowest position, wheels locked, appropriate side rails in place/Call bell, personal items and telephone in reach/Instruct patient to call for assistance before getting out of bed or chair/Non-slip footwear when patient is out of bed/[place] to call system/Physically safe environment - no spills, clutter or unnecessary equipment/Purposeful Proactive Rounding/Room/bathroom lighting operational, light cord in reach

## 2023-07-13 NOTE — H&P ADULT - HISTORY OF PRESENT ILLNESS
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 Unable to get reliable history from patient and unable to reach family with number listed in EMR. Per ED note, sister stated that patient live alone, has generalized weakness, decrease oral intake and can no longer live independently. Undering chemo/radiotherapy per sister. A&O x 2 at baseline.  Hemodynamically stable, afebrile, sat well at RA. WBC 1.14, plt 111, K 4.1, Cr 1.14, glucose 108, hsTnT 10.4. CT head Vasogenic edema involving the left frontal parietal region is again seen with associated lesion involving the high left frontoparietal region, this lesion measures approximately 2.4 x 2.0 cm.

## 2023-07-13 NOTE — ED ADULT TRIAGE NOTE - CHIEF COMPLAINT QUOTE
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

## 2023-07-13 NOTE — H&P ADULT - NSHPPHYSICALEXAM_GEN_ALL_CORE
CONSTITUTIONAL: alert but not cooperative, no acute distress.  EYES: PERRL, no scleral icterus  ENT: Mucosa moist, tongue normal  NECK: Neck supple, trachea midline, non-tender.  CARDIAC: Normal S1 and S2. Regular rate and rhythms. No Pedal edema. Peripheral pulses intact  LUNGS: Equal air entry both lungs. No rales, rhonchi, wheezing. Normal respiratory effort.   ABDOMEN: Soft, nondistended, nontender. No guarding or rebound tenderness. No hepatomegaly or splenomegaly. Bowel sound normal  MUSCULOSKELETAL: Normocephalic, atraumatic. No significant deformity or joint abnormality. No varicosities.  NEUROLOGICAL: Not cooperative with exam. Patient able to move all extremities  SKIN: no lesions or eruptions. Normal turgor  PSYCHIATRIC: A&O x 3, appropriate mood and affect. Normal insight and judgement.

## 2023-07-13 NOTE — H&P ADULT - PROBLEM SELECTOR PLAN 3
NSCLC with brain mets ( on chemo/ brain radiotherapy), s/p dexamethasone taper per radiation oncology in 05/2023 admission at [facility]  CT head  ( I personally review) with Vasogenic edema involving the left frontal parietal region is again seen with associated lesion involving the high left frontoparietal region, this lesion measures approximately 2.4 x 2.0 cm.  MRI brain on 4/24/23 with multiple enhancing masses within the brain, the largest ring enhancing mass measuring 2.9 x 2.7 x 3.0 cm in the LEFT parietal lobe with moderate surrounding edema which demonstrates mass effect on the posterior horn of the LEFT lateral ventricle. A 1.4 cm lesion is medially with mild surrounding edema and a 9 mm ring-enhancing lesion is seen in the RIGHT occipital cortex with minimal edema. 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 Will hold off steroid. If any concern for new neurological symptoms, will then start dexamethasone  Oncology consulted  Given brain met with vasogenic edema, SCD for DVT prophylaxis,  If ok from oncology, will consider chemical DVT prophylaxis  Palliative care consult for [facility]OC discussion NSCLC with brain mets ( on chemo/ brain radiotherapy), s/p dexamethasone taper per radiation oncology in 05/2023 admission at [facility]  CT head  ( I personally review) with Vasogenic edema involving the left frontal parietal region is again seen with associated lesion involving the high left frontoparietal region, this lesion measures approximately 2.4 x 2.0 cm.  MRI brain on 4/24/23 with multiple enhancing masses within the brain, the largest ring enhancing mass measuring 2.9 x 2.7 x 3.0 cm in the LEFT parietal lobe with moderate surrounding edema which demonstrates mass effect on the posterior horn of the LEFT lateral ventricle. A 1.4 cm lesion is medially with mild surrounding edema and a 9 mm ring-enhancing lesion is seen in the RIGHT occipital cortex with minimal edema. 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 Will hold off steroid. If any concern for new neurological symptoms, will then start dexamethasone  Oncology consulted  Given brain met with vasogenic edema, SCD for DVT prophylaxis,  If ok from oncology, will consider chemical DVT prophylaxis  Continue keppra 500mg bid  Palliative care consult for [facility] discussion

## 2023-07-13 NOTE — ED ADULT NURSE NOTE - OBJECTIVE STATEMENT
78 yo male, A&Ox2, BIBEMS from home c/o decreased po intake/decreased appetite over past week c/o generalized weakness denies any pain stage IV lung ca with brain mets on chemo/radiation per ems slight confusion at baseline ivhl#16 l a/c

## 2023-07-13 NOTE — ED ADULT TRIAGE NOTE - HEIGHT IN FEET
INTERVENTIONAL RADIOLOGY PROCEDURE NOTE    Date: 10/11/2021    Procedure: LEFT THORACENTESIS    Preoperative diagnosis:   1  Renal hematoma    2  Sepsis ([address] )    3  Pleural effusion    4  Iron deficiency anemia due to chronic blood loss         Postoperative diagnosis: Same  Surgeon: Manpreet Zepeda MD     Assistant: None  No qualified resident was available  Blood loss: 0    Specimens:  Multiple    Findings:  Small left pleural effusion  90 cc fluid aspirated , inability to aspirate more despite using a syringe    Complications: None immediate      Anesthesia: local 5

## 2023-07-13 NOTE — PATIENT PROFILE ADULT - HOME ACCESSIBILITY CONCERNS
Care Suites Post Procedure Note    Patient Information  Name: Ivon Gamble  Age: 30 year old    Post Procedure  Time patient returned to Care Suites: 1150  Concerns/abnormal assessment: NA  If abnormal assessment, provider notified: N/A  Plan/Other: Monitor vitals, site and sedation. Pt to go to apheresis once SureshO    Andrew Melissa RN      stairs to enter home

## 2023-07-13 NOTE — H&P ADULT - PROBLEM SELECTOR PLAN 1
likely due to mets NSCLC and decrease oral intake  PT consult  Nutrition consult  Likely need placement, S/W consult

## 2023-07-13 NOTE — H&P ADULT - PROBLEM SELECTOR PLAN 2
BMI 16.8  Nutrition consult BMI 16.8  Nutrition consult  appear dry, gentle IV hydration for 1 day  Encourage oral intake

## 2023-07-14 NOTE — CONSULT NOTE ADULT - CONVERSATION DETAILS
Spoke with patient's sister, Michelle Deluca [phone] at bedside.   Patient has HCP on file from 5/2023 listing his sister, Michelle as primary HCP, patient with confused, not able to make medical decisions for himself at present.   Discussed patient is requiring more help as he was living home alone and not safe to continue doing so.  Patient has been on chemo s/p whole brain RT and said he has been declining since.  Explained patient is frail and high risk for further decline and is eligible for hospice if within GOC and could have comfort measures in a NH.  Further, interventions such CPR would likely cause more harm than benefit given his frail state with metastatic disease.  She said she has been thinking about that as well.  Offered family meeting if needed.  Plan to follow up Monday.

## 2023-07-14 NOTE — CONSULT NOTE ADULT - PROBLEM SELECTOR RECOMMENDATION 4
Clinical evidence indicates that the patient has Severe protein calorie malnutrition/ 3rd degree    In context of  Chronic Illness (>1 month)    Energy/Food intake <50% of estimated energy requirement >5 days  Body Fat loss: Severe   (Cachexia, temporal wasting, muscle atrophy)  Fluid Accumulation: Severe (Fluid overload, ascites, pleural effusions)   Strength: weakened   Recommend:   diet as tolerated, dietician consult placed consider protein supplement assistance with ADLs  was living alone prior, likely needs more help now  PT consult appreciated, recommending HO

## 2023-07-14 NOTE — DIETITIAN INITIAL EVALUATION ADULT - PERTINENT LABORATORY DATA
07-14    140  |  108  |  24<H>  ----------------------------<  186<H>  3.9   |  25  |  1.12    Ca    9.2      14 Jul 2023 10:12  Phos  2.7     07-14  Mg     1.7     07-14    TPro  7.6  /  Alb  3.3  /  TBili  0.7  /  DBili  x   /  AST  37  /  ALT  27  /  AlkPhos  76  07-14  A1C with Estimated Average Glucose Result: 5.7 % (05-12-23 @ 05:15)

## 2023-07-14 NOTE — CONSULT NOTE ADULT - PROBLEM SELECTOR RECOMMENDATION 9
patient was diagnosed in May 2023 was started on  CTH with vasogenic edema, prior use of steroids, would consider adding dexamethasone on keppra  generalized weakness, frail, ECOG 3  eligible for hospice if within GOC patient was diagnosed in May 2023 has had whole brain RT and chemo, follows with [facility]  CTH with vasogenic edema, prior use of steroids, could consider adding dexamethasone on keppra  generalized weakness, frail, ECOG 3  eligible for hospice if within GOC patient was diagnosed in May 2023 has had whole brain RT and chemo, follows with [facility], follows with Dr. Akhtar, sister said memory has been declining since RT  CTH with vasogenic edema, prior use of steroids, could consider adding dexamethasone on keppra  generalized weakness, frail, ECOG 3  eligible for hospice if within GOC

## 2023-07-14 NOTE — PHYSICAL THERAPY INITIAL EVALUATION ADULT - PERTINENT HX OF CURRENT PROBLEM, REHAB EVAL
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 Unable to get reliable history from patient and unable to reach family with number listed in EMR. Per ED note, sister stated that patient live alone, has generalized weakness, decrease oral intake and can no longer live independently. Undering chemo/radiotherapy per sister. A&O x 2 at baseline.

## 2023-07-14 NOTE — DIETITIAN INITIAL EVALUATION ADULT - OTHER INFO
Pt with PMHx of dementia, non-small cell lung cancer (NSCLC) with mets to brain (on chemo/radiotherapy), COPD, & hx of ETOH use.  Pt presented with generalized weakness & poor appetite; admitted with FTT.  Per chart review, pt's sister stated pt lives alone and can no longer live independently; PT requesting HO and pt's sister requesting LT placement.  Pt appears confused, disoriented; unable to obtain reliable information regarding diet and wt hx. Pt with generally poor PO intake during admission, <25%; per PCA, pt only ate banana on tray; will add nutrition supplement for additional nutrition. Pt appears edentulous; will change consistency to soft and bite sized in meantime, however recommend swallow evaluation with SLP to determine appropriate food and fluid consistency.  Per previous RD note, pt reported  lbs., & current wt 117 lbs.; time frame of wt loss unknown.

## 2023-07-14 NOTE — CONSULT NOTE ADULT - ASSESSMENT
77 year old male PMH of NSCLC with brain mets diagnosed May 2023, dementia presented to the ED for generalized weakness and decreased po intake.  Patient with findings of vasogenic edema on CT scan.  Patient was living alone prior, likely needs more assistance at this time.  Palliative care consulted for [facility].   77 year old male PMH of NSCLC with brain mets diagnosed May 2023, ETOH misuse, tobacco use history, dementia presented to the ED for generalized weakness and decreased po intake.  Patient with findings of vasogenic edema on CT scan.  Patient was living alone prior, likely needs more assistance at this time.  Palliative care consulted for [facility].   77 year old male PMH of NSCLC with brain mets diagnosed May 2023, ETOH misuse, tobacco use history presented to the ED for generalized weakness and decreased po intake.  Patient with findings of vasogenic edema on CT scan.  Patient was living alone prior, likely needs more assistance at this time.  Palliative care consulted for [facility].

## 2023-07-14 NOTE — DIETITIAN INITIAL EVALUATION ADULT - ADD RECOMMEND
Continue to provide encouragement and assistance with PO intake  Swallow evaluation with SLP  Palliative care consult pending

## 2023-07-14 NOTE — DIETITIAN INITIAL EVALUATION ADULT - PROBLEM SELECTOR PLAN 3
NSCLC with brain mets ( on chemo/ brain radiotherapy), s/p dexamethasone taper per radiation oncology in 05/2023 admission at [facility]  CT head  ( I personally review) with Vasogenic edema involving the left frontal parietal region is again seen with associated lesion involving the high left frontoparietal region, this lesion measures approximately 2.4 x 2.0 cm.  MRI brain on 4/24/23 with multiple enhancing masses within the brain, the largest ring enhancing mass measuring 2.9 x 2.7 x 3.0 cm in the LEFT parietal lobe with moderate surrounding edema which demonstrates mass effect on the posterior horn of the LEFT lateral ventricle. A 1.4 cm lesion is medially with mild surrounding edema and a 9 mm ring-enhancing lesion is seen in the RIGHT occipital cortex with minimal edema. 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 Will hold off steroid. If any concern for new neurological symptoms, will then start dexamethasone  Oncology consulted  Given brain met with vasogenic edema, SCD for DVT prophylaxis,  If ok from oncology, will consider chemical DVT prophylaxis  Continue keppra 500mg bid  Palliative care consult for GOC discussion

## 2023-07-14 NOTE — DIETITIAN INITIAL EVALUATION ADULT - ETIOLOGY
Inadequate protein-energy intake & increased protein-energy needs related to hx of dementia, COPD, & lung cancer with mets to brain

## 2023-07-14 NOTE — CONSULT NOTE ADULT - SUBJECTIVE AND OBJECTIVE BOX
HPI:  77 years old male with h/o demmentia, NSCLC with brain mets ( on chemo/radiotherapy), COPD, h/o alcohol use was brought in to ED with complain of generalized weakness and poor appetite. Unable to get reliable history from patient and unable to reach family with number listed in EMR. Per ED note, sister stated that patient live alone, has generalized weakness, decrease oral intake and can no longer live independently. Undering chemo/radiotherapy per sister. A&O x 2 at baseline.  Hemodynamically stable, afebrile, sat well at RA. WBC 1.14, plt 111, K 4.1, Cr 1.14, glucose 108, hsTnT 10.4. CT head Vasogenic edema involving the left frontal parietal region is again seen with associated lesion involving the high left frontoparietal region, this lesion measures approximately 2.4 x 2.0 cm.       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      No significant past surgical history      FAMILY HISTORY:  No pertinent family history in first degree relatives      ITEMS NOT CHECKED ARE NOT PRESENT    SOCIAL HISTORY:   Significant other/partner:  [ ]  Children:  [ ]  Denominational/Spirituality:  Substance hx:  [ ]   Tobacco hx:  [ ]   Alcohol hx: [ ]   Home Opioid hx:  [ ] I-Stop Reference No: Reference #: [id number]  Living Situation: [ ]Home  [ ]Long term care  [ ]Rehab [ ]Other    ADVANCE DIRECTIVES:    DNR  MOLST  [ ]  Living Will  [ ]   DECISION MAKER(s):  [x ] Health Care Proxy(s)  [ ] Surrogate(s)  [ ] Guardian           Name(s): Phone Number(s): Michelle Joseph [phone]    BASELINE (I)ADL(s) (prior to admission):  [place]: [ ]Total  [ ] Moderate [ ]Dependent    Allergies    No Known Allergies    Intolerances    MEDICATIONS  (STANDING):  budesonide  80 MICROgram(s)/formoterol 4.5 MICROgram(s) Inhaler 2 Puff(s) Inhalation two times a day  folic acid 1 milliGRAM(s) Oral daily  lactated ringers. 1000 milliLiter(s) (100 mL/Hr) IV Continuous <Continuous>  levETIRAcetam 500 milliGRAM(s) Oral two times a day  multivitamin 1 Tablet(s) Oral daily  pantoprazole    Tablet 40 milliGRAM(s) Oral before breakfast  tiotropium 2.5 MICROgram(s) Inhaler 2 Puff(s) Inhalation daily    MEDICATIONS  (PRN):  acetaminophen     Tablet .. 650 milliGRAM(s) Oral every 6 hours PRN Mild Pain (1 - 3), Moderate Pain (4 - 6)  melatonin 3 milliGRAM(s) Oral at bedtime PRN Insomnia    PRESENT SYMPTOMS: [ ]Unable to obtain due to poor mentation   Source if other than patient:  [ ]Family   [ ]Team     Pain: [ ] yes [x] no  QOL impact -   Location -                    Aggravating factors -  Quality -  Radiation -  Timing-  Severity (0-10 scale):  Minimal acceptable level (0-10 scale):     PAIN AD Score:     http://[email or web address]/painad.pdf (press ctrl +  left click to view)    Dyspnea:                           [ ]Mild [ ]Moderate [ ]Severe  Anxiety:                             [ ]Mild [ ]Moderate [ ]Severe  Fatigue:                             [ x]Mild [ ]Moderate [ ]Severe  Nausea:                             [ ]Mild [ ]Moderate [ ]Severe  Loss of appetite:              [ ]Mild [ ]Moderate [ ]Severe  Constipation:                    [ ]Mild [ ]Moderate [ ]Severe    Other Symptoms:  [x ]All other review of systems negative     Karnofsky Performance Score/Palliative Performance Status Version 2:         %    http://npcrc.org/files/news/palliative_performance_scale_ppsv2.pdf  PHYSICAL EXAM:  Vital Signs Last 24 Hrs  T(C): 36.4 (14 Jul 2023 11:13), Max: 36.9 (13 Jul 2023 16:12)  T(F): 97.6 (14 Jul 2023 11:13), Max: 98.5 (13 Jul 2023 16:12)  HR: 76 (14 Jul 2023 11:13) (68 - 76)  BP: 98/67 (14 Jul 2023 11:13) (98/67 - 111/69)  BP(mean): --  RR: 18 (14 Jul 2023 11:13) (17 - 18)  SpO2: 98% (14 Jul 2023 11:13) (98% - 100%)    Parameters below as of 14 Jul 2023 11:13  Patient On (Oxygen Delivery Method): room air     I&O's Summary    13 Jul 2023 07:01  -  14 Jul 2023 07:00  --------------------------------------------------------  IN: 0 mL / OUT: 2 mL / NET: -2 mL    GENERAL:  [ ]Alert  [ ]Oriented x   [ ]Lethargic  [ ]Cachexia  [ ]Unarousable  [ x]Verbal  [ ]Non-Verbal  Behavioral:   [ ] Anxiety  [ ] Delirium [ ] Agitation [ ] Other  HEENT:  [ ]Normal   [ ]Dry mouth   [ ]ET Tube/Trach  [ ]Oral lesions  PULMONARY:   [ ]Clear [ ]Tachypnea  [ ]Audible excessive secretions   [ ]Rhonchi        [ ]Right [ ]Left [ ]Bilateral  [ ]Crackles        [ ]Right [ ]Left [ ]Bilateral  [ ]Wheezing     [ ]Right [ ]Left [ ]Bilateral  diminished breath sounds bilaterally  CARDIOVASCULAR:    [ ]Regular [ ]Irregular [ ]Tachy  [ ]Ab [ ]Murmur [ ]Other  GASTROINTESTINAL:  [ x]Soft  [ ]Distended   [ ]+BS  [x ]Non tender [ ]Tender  [ ]PEG [ ]OGT/ NGT  Last BM: prior to admission GENITOURINARY:  [x ]Normal [ ] Incontinent   [ ]Oliguria/Anuria   [ ]Cole  MUSCULOSKELETAL:   [ ]Normal   [ x]Weakness  [ ]Bed/Wheelchair bound [ ]Edema  NEUROLOGIC:   [ ]No focal deficits  [ ] Cognitive impairment  [ ] Dysphagia [ ]Dysarthria [ ] Paresis [ ]Other   SKIN:   [ ]Normal   [ ]Pressure ulcer(s)  [ ]Rash    CRITICAL CARE:  [ ] Shock Present  [ ]Septic [ ]Cardiogenic [ ]Neurologic [ ]Hypovolemic  [ ]  Vasopressors [ ]  Inotropes   [ ] Respiratory failure present [ ] mechanical ventilation [ ] non-invasive ventilatory support [ ] High flow  [ ] Acute  [ ] Chronic [ ] Hypoxic  [ ] Hypercarbic [ ] Other  [ ] Other organ failure     LABS:                        11.5   1.12  )-----------( 78       ( 14 Jul 2023 10:12 )             32.1   07-14    140  |  108  |  24<H>  ----------------------------<  186<H>  3.9   |  25  |  1.12    Ca    9.2      14 Jul 2023 10:12  Phos  2.7     07-14  Mg     1.7     07-14    TPro  7.6  /  Alb  3.3  /  TBili  0.7  /  DBili  x   /  AST  37  /  ALT  27  /  AlkPhos  76  07-14      Urinalysis Basic - ( 14 Jul 2023 10:12 )    Color: x / Appearance: x / SG: x / pH: x  Gluc: 186 mg/dL / Ketone: x  / Bili: x / Urobili: x   Blood: x / Protein: x / Nitrite: x   Leuk Esterase: x / RBC: x / WBC x   Sq Epi: x / Non Sq Epi: x / Bacteria: x    RADIOLOGY & ADDITIONAL STUDIES:   < from: Xray Chest 1 View- PORTABLE-Urgent (Xray Chest 1 View- PORTABLE-Urgent .) (07.13.23 @ 13:04) >  IMPRESSION: Unchanged chest similar to thousand 12 COPD and left base   infiltrate.  --- End of Report ---  < end of copied text >    < from: CT Head No Cont (07.13.23 @ 12:27) >  IMPRESSION: Vasogenic edema with associated lesion again seen as   described above.  --- End of Report --  < end of copied text >    PROTEIN CALORIE MALNUTRITION PRESENT: [x ] Yes [ ] No  [ ] PPSV2 < or = to 30% [ ] significant weight loss  [ ] poor nutritional intake [ x] catabolic state [ ] anasarca     Artificial Nutrition [ ]     REFERRALS:   [ ]Chaplaincy  [ ] Hospice  [ ]Child Life  [ ]Social Work  [ ]Case management [ ]Holistic Therapy     Goals of Care Document:   Care Coordination Assessment 201 [C. Provider] (07-14-23 @ 12:16)   Progress Notes - Care Coordination [C. Provider] (07-14-23 @ 12:01)   HPI:  77 years old male with h/o demmentia, NSCLC with brain mets ( on chemo/radiotherapy), COPD, h/o alcohol use was brought in to ED with complain of generalized weakness and poor appetite. Unable to get reliable history from patient and unable to reach family with number listed in EMR. Per ED note, sister stated that patient live alone, has generalized weakness, decrease oral intake and can no longer live independently. Undering chemo/radiotherapy per sister. A&O x 2 at baseline.  Hemodynamically stable, afebrile, sat well at RA. WBC 1.14, plt 111, K 4.1, Cr 1.14, glucose 108, hsTnT 10.4. CT head Vasogenic edema involving the left frontal parietal region is again seen with associated lesion involving the high left frontoparietal region, this lesion measures approximately 2.4 x 2.0 cm.       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      No significant past surgical history      FAMILY HISTORY:  No pertinent family history in first degree relatives      ITEMS NOT CHECKED ARE NOT PRESENT    SOCIAL HISTORY:   Significant other/partner:  [ ]  Children:  [ ]  Judaism/Spirituality:  Substance hx:  [ ]   Tobacco hx:  [ ]   Alcohol hx: [ ]   Home Opioid hx:  [ ] I-Stop Reference No: Reference #: [id number]  Living Situation: [x ]Home  [ ]Long term care  [ ]Rehab [ ]Other    ADVANCE DIRECTIVES:    DNR  MOLST  [ ]  Living Will  [ ]   DECISION MAKER(s):  [x ] Health Care Proxy(s)  [ ] Surrogate(s)  [ ] Guardian           Name(s): Phone Number(s): Michelle Deluca [phone]    BASELINE (I)ADL(s) (prior to admission):  [place]: [ ]Total  [ x] Moderate [ ]Dependent    Allergies    No Known Allergies    Intolerances    MEDICATIONS  (STANDING):  budesonide  80 MICROgram(s)/formoterol 4.5 MICROgram(s) Inhaler 2 Puff(s) Inhalation two times a day  folic acid 1 milliGRAM(s) Oral daily  lactated ringers. 1000 milliLiter(s) (100 mL/Hr) IV Continuous <Continuous>  levETIRAcetam 500 milliGRAM(s) Oral two times a day  multivitamin 1 Tablet(s) Oral daily  pantoprazole    Tablet 40 milliGRAM(s) Oral before breakfast  tiotropium 2.5 MICROgram(s) Inhaler 2 Puff(s) Inhalation daily    MEDICATIONS  (PRN):  acetaminophen     Tablet .. 650 milliGRAM(s) Oral every 6 hours PRN Mild Pain (1 - 3), Moderate Pain (4 - 6)  melatonin 3 milliGRAM(s) Oral at bedtime PRN Insomnia    PRESENT SYMPTOMS: [ ]Unable to obtain due to poor mentation   Source if other than patient:  [ ]Family   [ ]Team     Pain: [ ] yes [x] no  QOL impact -   Location -                    Aggravating factors -  Quality -  Radiation -  Timing-  Severity (0-10 scale):  Minimal acceptable level (0-10 scale):     PAIN AD Score:     [email or web address]/[email or web address] (press ctrl +  left click to view)    Dyspnea:                           [ ]Mild [ ]Moderate [ ]Severe  Anxiety:                             [ ]Mild [ ]Moderate [ ]Severe  Fatigue:                             [ x]Mild [ ]Moderate [ ]Severe  Nausea:                             [ ]Mild [ ]Moderate [ ]Severe  Loss of appetite:              [ ]Mild [ ]Moderate [ ]Severe  Constipation:                    [ ]Mild [ ]Moderate [ ]Severe    Other Symptoms:  [x ]All other review of systems negative     Karnofsky Performance Score/Palliative Performance Status Version 2:      50   %    http://npcrc.org/files/news/palliative_performance_scale_ppsv2.pdf  PHYSICAL EXAM:  Vital Signs Last 24 Hrs  T(C): 36.4 (14 Jul 2023 11:13), Max: 36.9 (13 Jul 2023 16:12)  T(F): 97.6 (14 Jul 2023 11:13), Max: 98.5 (13 Jul 2023 16:12)  HR: 76 (14 Jul 2023 11:13) (68 - 76)  BP: 98/67 (14 Jul 2023 11:13) (98/67 - 111/69)  BP(mean): --  RR: 18 (14 Jul 2023 11:13) (17 - 18)  SpO2: 98% (14 Jul 2023 11:13) (98% - 100%)    Parameters below as of 14 Jul 2023 11:13  Patient On (Oxygen Delivery Method): room air     I&O's Summary    13 Jul 2023 07:01 - 14 Jul 2023 07:00  --------------------------------------------------------  IN: 0 mL / OUT: 2 mL / NET: -2 mL    GENERAL:  [ ]Alert  [ ]Oriented x   [ ]Lethargic  [ ]Cachexia  [ ]Unarousable  [ x]Verbal  [ ]Non-Verbal  Behavioral:   [ ] Anxiety  [ ] Delirium [ ] Agitation [ ] Other  HEENT:  [ ]Normal   [ ]Dry mouth   [ ]ET Tube/Trach  [ ]Oral lesions  PULMONARY:   [ ]Clear [ ]Tachypnea  [ ]Audible excessive secretions   [ ]Rhonchi        [ ]Right [ ]Left [ ]Bilateral  [ ]Crackles        [ ]Right [ ]Left [ ]Bilateral  [ ]Wheezing     [ ]Right [ ]Left [ ]Bilateral  diminished breath sounds bilaterally  CARDIOVASCULAR:    [ ]Regular [ ]Irregular [ ]Tachy  [ ]Ab [ ]Murmur [ ]Other  GASTROINTESTINAL:  [ x]Soft  [ ]Distended   [ ]+BS  [x ]Non tender [ ]Tender  [ ]PEG [ ]OGT/ NGT  Last BM: prior to admission GENITOURINARY:  [x ]Normal [ ] Incontinent   [ ]Oliguria/Anuria   [ ]Cole  MUSCULOSKELETAL:   [ ]Normal   [ x]Weakness  [ ]Bed/Wheelchair bound [ ]Edema  NEUROLOGIC:   [ ]No focal deficits  [x ] Cognitive impairment  [ ] Dysphagia [ ]Dysarthria [ ] Paresis [ ]Other   SKIN:   [ ]Normal   [ ]Pressure ulcer(s)  [ ]Rash    CRITICAL CARE:  [ ] Shock Present  [ ]Septic [ ]Cardiogenic [ ]Neurologic [ ]Hypovolemic  [ ]  Vasopressors [ ]  Inotropes   [ ] Respiratory failure present [ ] mechanical ventilation [ ] non-invasive ventilatory support [ ] High flow  [ ] Acute  [ ] Chronic [ ] Hypoxic  [ ] Hypercarbic [ ] Other  [ ] Other organ failure     LABS:                        11.5   1.12  )-----------( 78       ( 14 Jul 2023 10:12 )             32.1   07-14    140  |  108  |  24<H>  ----------------------------<  186<H>  3.9   |  25  |  1.12    Ca    9.2      14 Jul 2023 10:12  Phos  2.7     07-14  Mg     1.7     07-14    TPro  7.6  /  Alb  3.3  /  TBili  0.7  /  DBili  x   /  AST  37  /  ALT  27  /  AlkPhos  76  07-14      Urinalysis Basic - ( 14 Jul 2023 10:12 )    Color: x / Appearance: x / SG: x / pH: x  Gluc: 186 mg/dL / Ketone: x  / Bili: x / Urobili: x   Blood: x / Protein: x / Nitrite: x   Leuk Esterase: x / RBC: x / WBC x   Sq Epi: x / Non Sq Epi: x / Bacteria: x    RADIOLOGY & ADDITIONAL STUDIES:   < from: Xray Chest 1 View- PORTABLE-Urgent (Xray Chest 1 View- PORTABLE-Urgent .) (07.13.23 @ 13:04) >  IMPRESSION: Unchanged chest similar to thousand 12 COPD and left base   infiltrate.  --- End of Report ---  < end of copied text >    < from: CT Head No Cont (07.13.23 @ 12:27) >  IMPRESSION: Vasogenic edema with associated lesion again seen as   described above.  --- End of Report --  < end of copied text >    PROTEIN CALORIE MALNUTRITION PRESENT: [x ] Yes [ ] No  [ ] PPSV2 < or = to 30% [ ] significant weight loss  [ ] poor nutritional intake [ x] catabolic state [ ] anasarca     Artificial Nutrition [ ]     REFERRALS:   [ ]Chaplaincy  [ ] Hospice  [ ]Child Life  [ ]Social Work  [ ]Case management [ ]Holistic Therapy     Goals of Care Document:   Care Coordination Assessment 201 [C. Provider] (07-14-23 @ 12:16)   Progress Notes - Care Coordination [C. Provider] (07-14-23 @ 12:01)   HPI:  77 years old male with h/o dementia, NSCLC with brain mets ( on chemo/radiotherapy), COPD, h/o alcohol use was brought in to ED with complain of generalized weakness and poor appetite. Unable to get reliable history from patient and unable to reach family with number listed in EMR. Per ED note, sister stated that patient live alone, has generalized weakness, decrease oral intake and can no longer live independently. Undering chemo/radiotherapy per sister. A&O x 2 at baseline.  Hemodynamically stable, afebrile, sat well at RA. WBC 1.14, plt 111, K 4.1, Cr 1.14, glucose 108, hsTnT 10.4. CT head Vasogenic edema involving the left frontal parietal region is again seen with associated lesion involving the high left frontoparietal region, this lesion measures approximately 2.4 x 2.0 cm.       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      No significant past surgical history      FAMILY HISTORY:  No pertinent family history in first degree relatives      ITEMS NOT CHECKED ARE NOT PRESENT    SOCIAL HISTORY:   Significant other/partner:  [ ]  Children:  [ ]  Presybeterian/Spirituality:  Substance hx:  [ ]   Tobacco hx:  [ ]   Alcohol hx: [ ]   Home Opioid hx:  [ ] I-Stop Reference No: Reference #: [id number]  Living Situation: [x ]Home  [ ]Long term care  [ ]Rehab [ ]Other    ADVANCE DIRECTIVES:    DNR  MOLST  [ ]  Living Will  [ ]   DECISION MAKER(s):  [x ] Health Care Proxy(s)  [ ] Surrogate(s)  [ ] Guardian           Name(s): Phone Number(s): Michelle Deluca [phone]    BASELINE (I)ADL(s) (prior to admission):  [place]: [ ]Total  [ x] Moderate [ ]Dependent    Allergies    No Known Allergies    Intolerances    MEDICATIONS  (STANDING):  budesonide  80 MICROgram(s)/formoterol 4.5 MICROgram(s) Inhaler 2 Puff(s) Inhalation two times a day  folic acid 1 milliGRAM(s) Oral daily  lactated ringers. 1000 milliLiter(s) (100 mL/Hr) IV Continuous <Continuous>  levETIRAcetam 500 milliGRAM(s) Oral two times a day  multivitamin 1 Tablet(s) Oral daily  pantoprazole    Tablet 40 milliGRAM(s) Oral before breakfast  tiotropium 2.5 MICROgram(s) Inhaler 2 Puff(s) Inhalation daily    MEDICATIONS  (PRN):  acetaminophen     Tablet .. 650 milliGRAM(s) Oral every 6 hours PRN Mild Pain (1 - 3), Moderate Pain (4 - 6)  melatonin 3 milliGRAM(s) Oral at bedtime PRN Insomnia    PRESENT SYMPTOMS: [ ]Unable to obtain due to poor mentation   Source if other than patient:  [ ]Family   [ ]Team     Pain: [ ] yes [x] no  QOL impact -   Location -                    Aggravating factors -  Quality -  Radiation -  Timing-  Severity (0-10 scale):  Minimal acceptable level (0-10 scale):     PAIN AD Score:     http://[email or web address]/painad.pdf (press ctrl +  left click to view)    Dyspnea:                           [ ]Mild [ ]Moderate [ ]Severe  Anxiety:                             [ ]Mild [ ]Moderate [ ]Severe  Fatigue:                             [ x]Mild [ ]Moderate [ ]Severe  Nausea:                             [ ]Mild [ ]Moderate [ ]Severe  Loss of appetite:              [ ]Mild [ ]Moderate [ ]Severe  Constipation:                    [ ]Mild [ ]Moderate [ ]Severe    Other Symptoms:  [x ]All other review of systems negative     Karnofsky Performance Score/Palliative Performance Status Version 2:      50   %    http://npcrc.org/files/news/palliative_performance_scale_ppsv2.pdf  PHYSICAL EXAM:  Vital Signs Last 24 Hrs  T(C): 36.4 (14 Jul 2023 11:13), Max: 36.9 (13 Jul 2023 16:12)  T(F): 97.6 (14 Jul 2023 11:13), Max: 98.5 (13 Jul 2023 16:12)  HR: 76 (14 Jul 2023 11:13) (68 - 76)  BP: 98/67 (14 Jul 2023 11:13) (98/67 - 111/69)  BP(mean): --  RR: 18 (14 Jul 2023 11:13) (17 - 18)  SpO2: 98% (14 Jul 2023 11:13) (98% - 100%)    Parameters below as of 14 Jul 2023 11:13  Patient On (Oxygen Delivery Method): room air     I&O's Summary    13 Jul 2023 07:01  - 14 Jul 2023 07:00  --------------------------------------------------------  IN: 0 mL / OUT: 2 mL / NET: -2 mL    GENERAL:  [ ]Alert  [ ]Oriented x   [ ]Lethargic  [ ]Cachexia  [ ]Unarousable  [ x]Verbal  [ ]Non-Verbal  Behavioral:   [ ] Anxiety  [ ] Delirium [ ] Agitation [ ] Other  HEENT:  [ ]Normal   [ ]Dry mouth   [ ]ET Tube/Trach  [ ]Oral lesions  PULMONARY:   [ ]Clear [ ]Tachypnea  [ ]Audible excessive secretions   [ ]Rhonchi        [ ]Right [ ]Left [ ]Bilateral  [ ]Crackles        [ ]Right [ ]Left [ ]Bilateral  [ ]Wheezing     [ ]Right [ ]Left [ ]Bilateral  diminished breath sounds bilaterally  CARDIOVASCULAR:    [ ]Regular [ ]Irregular [ ]Tachy  [ ]Ab [ ]Murmur [ ]Other  GASTROINTESTINAL:  [ x]Soft  [ ]Distended   [ ]+BS  [x ]Non tender [ ]Tender  [ ]PEG [ ]OGT/ NGT  Last BM: prior to admission GENITOURINARY:  [x ]Normal [ ] Incontinent   [ ]Oliguria/Anuria   [ ]Cole  MUSCULOSKELETAL:   [ ]Normal   [ x]Weakness  [ ]Bed/Wheelchair bound [ ]Edema  NEUROLOGIC:   [ ]No focal deficits  [x ] Cognitive impairment  [ ] Dysphagia [ ]Dysarthria [ ] Paresis [ ]Other   SKIN:   [ ]Normal   [ ]Pressure ulcer(s)  [ ]Rash    CRITICAL CARE:  [ ] Shock Present  [ ]Septic [ ]Cardiogenic [ ]Neurologic [ ]Hypovolemic  [ ]  Vasopressors [ ]  Inotropes   [ ] Respiratory failure present [ ] mechanical ventilation [ ] non-invasive ventilatory support [ ] High flow  [ ] Acute  [ ] Chronic [ ] Hypoxic  [ ] Hypercarbic [ ] Other  [ ] Other organ failure     LABS:                        11.5   1.12  )-----------( 78 ( 14 Jul 2023 10:12 )             32.1   07-14    140  |  108  |  24<H>  ----------------------------<  186<H>  3.9   |  25  |  1.12    Ca    9.2      14 Jul 2023 10:12  Phos  2.7     07-14  Mg     1.7     07-14    TPro  7.6  /  Alb  3.3  /  TBili  0.7  /  DBili  x   /  AST  37  /  ALT  27  /  AlkPhos  76  07-14      Urinalysis Basic - ( 14 Jul 2023 10:12 )    Color: x / Appearance: x / SG: x / pH: x  Gluc: 186 mg/dL / Ketone: x  / Bili: x / Urobili: x   Blood: x / Protein: x / Nitrite: x   Leuk Esterase: x / RBC: x / WBC x   Sq Epi: x / Non Sq Epi: x / Bacteria: x    RADIOLOGY & ADDITIONAL STUDIES:   < from: Xray Chest 1 View- PORTABLE-Urgent (Xray Chest 1 View- PORTABLE-Urgent .) (07.13.23 @ 13:04) >  IMPRESSION: Unchanged chest similar to thousand 12 COPD and left base   infiltrate.  --- End of Report ---  < end of copied text >    < from: CT Head No Cont (07.13.23 @ 12:27) >  IMPRESSION: Vasogenic edema with associated lesion again seen as   described above.  --- End of Report --  < end of copied text >    PROTEIN CALORIE MALNUTRITION PRESENT: [x ] Yes [ ] No  [ ] PPSV2 < or = to 30% [ ] significant weight loss  [ ] poor nutritional intake [ x] catabolic state [ ] anasarca     Artificial Nutrition [ ]     REFERRALS:   [ ]Chaplaincy  [ ] Hospice  [ ]Child Life  [ ]Social Work  [ ]Case management [ ]Holistic Therapy     Goals of Care Document:   Care Coordination Assessment 201 [C. Provider] (07-14-23 @ 12:16)   Progress Notes - Care Coordination [C. Provider] (07-14-23 @ 12:01)

## 2023-07-14 NOTE — DIETITIAN NUTRITION RISK NOTIFICATION - TREATMENT: THE FOLLOWING DIET HAS BEEN RECOMMENDED
Diet, Soft and Bite Sized:   Supplement Feeding Modality:  Oral  Ensure Plus High Protein Cans or Servings Per Day:  1       Frequency:  Three Times a day (07-14-23 @ 16:04) [Pending Verification By Attending]  Diet, Easy to Chew (07-13-23 @ 15:30) [Active]

## 2023-07-14 NOTE — DIETITIAN INITIAL EVALUATION ADULT - PERTINENT MEDS FT
MEDICATIONS  (STANDING):  budesonide  80 MICROgram(s)/formoterol 4.5 MICROgram(s) Inhaler 2 Puff(s) Inhalation two times a day  folic acid 1 milliGRAM(s) Oral daily  lactated ringers. 1000 milliLiter(s) (100 mL/Hr) IV Continuous <Continuous>  levETIRAcetam 500 milliGRAM(s) Oral two times a day  multivitamin 1 Tablet(s) Oral daily  pantoprazole    Tablet 40 milliGRAM(s) Oral before breakfast  tiotropium 2.5 MICROgram(s) Inhaler 2 Puff(s) Inhalation daily    MEDICATIONS  (PRN):  acetaminophen     Tablet .. 650 milliGRAM(s) Oral every 6 hours PRN Mild Pain (1 - 3), Moderate Pain (4 - 6)  melatonin 3 milliGRAM(s) Oral at bedtime PRN Insomnia

## 2023-07-14 NOTE — CONSULT NOTE ADULT - PROBLEM/RECOMMENDATION-3
Physical Therapy    Visit Type: treatment  Precautions:  Medical precautions:  fall risk; standard precautions.  Adm to OSH with vaginal bleeding & LE edema. 4/21 transferred to [facility] for higher level of care. W/u with enlarged uterus containing multiple probable myomas. Problem list: Acute blood loss anemia 2' vaginal bleeding, new dx type 2 DM, Anasarca, Bilateral lower extremity edema, Acute on chronic HFpEF.     CT chest/A/P: 4/22: pelvic mass suspicious for neoplasm., trace ascites/ peritoneal nodularity suspicious for peritoneal carcinomatosis., Left axillary lymphadenopathy, suspicious for metastatic disease;   Multiple small Bpulmonary nodules, suspicious for mets,  Multiple sclerotic lesions throughout the spine and pelvis, suspicious  for osseous metastatic disease;  Scattered liver lesions.     Lines:       Lines in chart and on patient reviewed, precautions maintained throughout session.  Hearing: hard of hearing  Vision:     Current vision: wears glasses only for reading  Safety Measures: bed alarm, bed rails and chair alarm  SUBJECTIVE  Patient agreed to participate in therapy this date.  States she has not been out of bed today  Patient / Family Goal: maximize function    Pain     At onset of session (out of 10): 0  RN informed on pain level     OBJECTIVE     Cognitive Status   Level of Consciousness   - alert  Affect/Behavior    - cooperative  Orientation    - Oriented to: person, place and time  Functional Communication   - Overall Status: within functional limits   - Forms of Communication: verbal  Following Direction   - follows one step commands with repetition  Transition Between Tasks   - transitions with cues  Safety Awareness/Insight   - decreased awareness of need for safety and decreased awareness of need for assistance  Awareness of Deficits   - assistance required to compensate for deficits    Posture:  - Seated: posterior pelvic tilt  - Standing: forward head and trunk lean  anterior    Skin:   Edematous BLE       Sitting Balance  (JOSLYN = base of support)  Static      Sitting static trial 1: min.     - Trial 1 details: with back unsupported, with double LE support, with double UE support and with verbal cues (3' EOB)    Standing Balance  (JOSLYN = base of support)  Firm Surface: Double Leg      - Static, Eyes Open       : min.       - Trial 1 details: with double UE support and contact guard (RW, CGA, postural cues)     - Dynamic, Eyes Open       - Trial 1 details: contact guard (stood sinkside to wash face/pericare after toileting, 5' with CGa/SBA)       Bed Mobility  - Supine to sit: minimal assist (HOB elevated)  BLE assist  Transfers  Assistive devices: gait belt, 2-wheeled walker  - Sit to stand: minimal assist  - Stand to sit: minimal assist  - Toilet: moderate assist  rec use of commode over toilet for inc height; repeated v/c for UE placement; toilet transfer with mod vc to use wall bar for support; noted bloody discharge in/on toilet; able to stand for  self pericare; able to stand sinkside to wash face with SBA for bal    Ambulation / Gait  - Assistive device: gait belt and two-wheeled walker  - Distance (feet unless otherwise indicated): 20, 20  - Assist Level: contact guard/touching/steadying assist  - Surface: even  - Description: decreased natalia/pace, decreased step length left, decreased step length right, forward flexed at hips and wide base of support  Mod postural cues; inc time needed to complete tasks     Interventions     Seated    Lower Extremity: Bilateral: knee extensions, toe raises and heel raises, AROM, 10 reps, 2 sets  Additional exercise details: educ on seated/supine BLE there ex; educ on importance of progressive mobility, OOBTC  Training provided: activity tolerance, balance retraining, bed mobility training, breathing/relaxation, functional ambulation, gait training, HEP training, positioning, transfer training, use of assistive device, body mechanics and  energy conservation    Skilled input: Verbal instruction/cues, tactile instruction/cues and posture correction  Verbal Consent: Writer verbally educated and received verbal consent for hand placement, positioning of patient, and techniques to be performed today from patient for therapist position for techniques and clothing adjustments for techniques as described above and how they are pertinent to the patient's plan of care.         ASSESSMENT   Impairments: activity tolerance, balance, desire/motivation, strength, edema, body habitus and endurance  Functional Limitations: all functional mobility  Pt able to amb 20'x2 with RW, CGA. Pt needing inc encouragement for consistent OOBTC for meals, use of toilet vs external catheter.Noted bloody discharge on bed, external cath, on/in toilet post use.       Discharge Recommendations  Recommendation for Discharge: PT IL: Patient is appropriate for daily Physical Therapy  PT/OT Mobility Equipment for Discharge: RW  PT Identified Barriers to Discharge: high risk for further falls, stairs to access home      Progress: improving as expected and progressing toward goals    • Skilled therapy is required to address these limitations in attempt to maximize the patient's independence.    Education:   - Present and ready to learn: patient  Education provided during session:  - Results of above outlined education: Needs reinforcement    Patient at End of Session:   Location: in chair  Safety measures: alarm system in place/re-engaged, call light within reach, equipment intact and lines intact  Handoff to: nurse    PLAN   Interventions: balance, bed mobility, body mechanics, energy conservation, gait training, ROM, HEP train/position, endurance training, functional transfer training, stairs retraining, patient/family training and safety education  Agreement to plan and goals: patient agrees with goals and treatment plan        GOALS  Review Date: 4/25/2023  Long Term Goals: (to be met  by time of discharge from hospital)  Rolling left: Patient will complete bed mobility for rolling left supervision (with bed rail).  Status: revised, this goal modified  Rolling right: Patient will complete bed mobility for rolling right supervision (with bed rail).  Status: revised, this goal modified  Sidelying to sit: Patient will complete bed mobility for sidelying to sit contact guard or touching/steadying and supervision.  Status: revised, this goal modified  Sit to stand: Patient will complete sit to stand transfer with 2-wheeled walker and gait belt, with minimal cues and supervision.   Status: revised, this goal modified  Stand to sit: Patient will complete stand to sit transfer with gait belt and 2-wheeled walker, with minimal cues and supervision.   Status: revised, this goal modified  Ambulation (even): Patient will ambulate on even surface for 40 feet with 2-wheeled walker, stand by assist.   Home program: patient independent with home program as instructed.   Status: progressing/ongoing    Documented in the chart in the following areas: Assessment.      Therapy procedure time and total treatment time can be found documented on the Time Entry flowsheet   DISPLAY PLAN FREE TEXT

## 2023-07-14 NOTE — CONSULT NOTE ADULT - NS ATTEND AMEND GEN_ALL_CORE FT
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  CT with vasogenic edema with associated brain mets. Similar to previous scans.  Pt alert but poor historian,  NAD. Sister Michelle is HCP established on previous admission at [facility]. Palliative will follow for ongoing GOC discussions pending clinical course. Hospice eligible depending on goals. Will need more support, pt lives alone.

## 2023-07-14 NOTE — CONSULT NOTE ADULT - SUBJECTIVE AND OBJECTIVE BOX
Hematology Consult Note    HPI:  77 years old male with h/o demmentia, NSCLC with brain mets ( on chemo/radiotherapy), COPD, h/o alcohol use was brought in to ED with complain of generalized weakness and poor appetite. Unable to get reliable history from patient and unable to reach family with number listed in EMR. Per ED note, sister stated that patient live alone, has generalized weakness, decrease oral intake and can no longer live independently. Undering chemo/radiotherapy per sister. A&O x 2 at baseline.  Hemodynamically stable, afebrile, sat well at RA. WBC 1.14, plt 111, K 4.1, Cr 1.14, glucose 108, hsTnT 10.4. CT head Vasogenic edema involving the left frontal parietal region is again seen with associated lesion involving the high left frontoparietal region, this lesion measures approximately 2.4 x 2.0 cm.        ONC HX:  Oncologist Dr. Travis Dugan ([facility])  TNM stage: M1   AJCC Stage: IV   Tumor Markers: PD-L1 TPS 0%     CASE SYNOPSIS:  4/23/2023   -CT head no contrast: Left frontoparietal mass with marked vasogenic edema; further evaluation with contrast–enhanced MRI recommended. Started on dexamethasone and Keppra.  - CT brain perfusion/CT angiogram head and neck: Abnormal perfusion left cerebral white matter, most likely related to vasogenic edema. Left vertebral artery not visualized until approximately C4 level, diminutive in size and attenuated, likely related to occlusion/severe stenosis. No CTA evidence of aneurysm or dissection.  4/24/2023  - CT C/A/P: Mediastinal and right hilar lymphadenopathy. Nonspecific clustered nodules left lower lobe. No evidence of metastatic disease within the chest or abdomen. Heterogeneous vertebral body appearance which could be due to underlying degenerative changes. Correlation with nuclear bone scan may be of benefit.  - MRI brain: Multiple enhancing masses within the brain, largest ring-enhancing mass measuring 2.9 x 2.7 x 3.0 cm in the left periatrial lobe with moderate surrounding edema which demonstrates mass effect on the posterior horn of the left lateral ventricle. A 1.4 cm lesion is medially with mild surrounding edema and a 9 mm ring-enhancing lesion is seen in the right occipital cortex with minimal edema. 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  5/5/2023   -EBUS–guided FNA LN level 4R: Pathology positive for MOC-31, TTF-1, CK7 and negative for CDX2, CK20, synaptophysin and chromogranin c/w metastatic non-small cell carcinoma, favor adenocarcinoma of primary pulmonary origin. PD-L1 TPS 0%.  5/15-5/19/23  - WBRT, 20 cGy/5 fractions  6/15/2023   -C1 D1 pemetrexed, carboplatin AUC 5, pembrolizumab 200.   7/6/2023  -C2 D1  pemetrexed, carboplatin AUC 5, pembrolizumab      Allergies    No Known Allergies    Intolerances        MEDICATIONS  (STANDING):  budesonide  80 MICROgram(s)/formoterol 4.5 MICROgram(s) Inhaler 2 Puff(s) Inhalation two times a day  folic acid 1 milliGRAM(s) Oral daily  lactated ringers. 1000 milliLiter(s) (100 mL/Hr) IV Continuous <Continuous>  levETIRAcetam 500 milliGRAM(s) Oral two times a day  multivitamin 1 Tablet(s) Oral daily  pantoprazole    Tablet 40 milliGRAM(s) Oral before breakfast  tiotropium 2.5 MICROgram(s) Inhaler 2 Puff(s) Inhalation daily    MEDICATIONS  (PRN):  acetaminophen     Tablet .. 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 No nausea, vomiting, or hematemesis; No diarrhea or constipation. No melena or hematochezia.  GENITOURINARY: No dysuria, frequency or hematuria  NEUROLOGICAL: No numbness or weakness  SKIN: No itching, burning, rashes, or lesions   All other review of systems is negative unless indicated above.    Height (cm): 177.8 (07-13 @ 11:44)  Weight (kg): 53.1 (07-13 @ 11:44)  BMI (kg/m2): 16.8 (07-13 @ 11:44)  BSA (m2): 1.66 (07-13 @ 11:44)    T(F): 97.4 (07-14-23 @ 06:04), Max: 98.5 (07-13-23 @ 16:12)  HR: 70 (07-14-23 @ 06:04)  BP: 107/68 (07-14-23 @ 06:04)  RR: 18 (07-14-23 @ 06:04)  SpO2: 98% (07-14-23 @ 06:04)  Wt(kg): --    GENERAL: NAD, cachetic   HEAD:  Atraumatic, Normocephalic  EYES: EOMI, PERRLA, conjunctiva and sclera clear  NECK: Supple, No JVD  CHEST/LUNG: diminished b/l   HEART: Regular rate and rhythm; No murmurs, rubs, or gallops  ABDOMEN: Soft, Nontender, Nondistended; Bowel sounds present  EXTREMITIES:  2+ Peripheral Pulses, No clubbing, cyanosis, or edema  NEUROLOGY: paranoid, and mistrustful, poor historian, A/Ox3?  SKIN: No rashes or lesions                          10.7   1.14  )-----------( 111      ( 13 Jul 2023 13:06 )             30.7       07-13    140  |  109<H>  |  27<H>  ----------------------------<  108<H>  4.1   |  23  |  1.14    Ca    9.5      13 Jul 2023 13:45    TPro  7.9  /  Alb  3.4  /  TBili  1.2  /  DBili  x   /  AST  35  /  ALT  25  /  AlkPhos  72  07-13        < from: CT Head No Cont (07.13.23 @ 12:27) >  ACC: [id number] EXAM:  CT BRAIN   ORDERED BY: BROWN SCALES     PROCEDURE DATE:  07/13/2023          INTERPRETATION:  Clinical indication: Weakness    Multiple axial sections were performed from base of vertex without   contrast enhancement. Coronal and sagittal reconstructions were performed    This exam is compared with prior head CT performed on April 23, 2023.    Parenchymal volume loss and chronic microvascular changes are again seen.    Abnormal low-attenuation involving the left basal ganglia/corona radiata   region is again seen with ex vacuo dilatation of the left horn. This is   likely compatible with an old infarct.    Vasogenic edema involving the left frontal parietal region is again seen   with associated lesion involving the high left frontoparietal region   (2-44) this lesion measures approximately 2.4 x 2.0 cm and previously   measured approximately 2.4 x 2.0 cm.    Evaluation of the osseous structures with the appropriate window appears   unremarkable    The visualized paranasal sinuses mastoid and middle ear regions appear   clear.    IMPRESSION: Vasogenic edema with associated lesion again seen as   described above.    --- End of Report ---         Hematology Consult Note    HPI:  77 years old male with h/o dementia, NSCLC with brain mets ( on chemo/radiotherapy), COPD, h/o alcohol use was brought in to ED with complain of generalized weakness and poor appetite. Unable to get reliable history from patient and unable to reach family with number listed in EMR. Per ED note, sister stated that patient live alone, has generalized weakness, decrease oral intake and can no longer live independently. Undering chemo/radiotherapy per sister. A&O x 2 at baseline.  Hemodynamically stable, afebrile, sat well at RA. WBC 1.14, plt 111, K 4.1, Cr 1.14, glucose 108, hsTnT 10.4. CT head Vasogenic edema involving the left frontal parietal region is again seen with associated lesion involving the high left frontoparietal region, this lesion measures approximately 2.4 x 2.0 cm.        ONC HX:  Oncologist Dr. Travis Dugan ([facility])  TNM stage: M1   AJCC Stage: IV   Tumor Markers: PD-L1 TPS 0%     CASE SYNOPSIS:  4/23/2023   -CT head no contrast: Left frontoparietal mass with marked vasogenic edema; further evaluation with contrast–enhanced MRI recommended. Started on dexamethasone and Keppra.  - CT brain perfusion/CT angiogram head and neck: Abnormal perfusion left cerebral white matter, most likely related to vasogenic edema. Left vertebral artery not visualized until approximately C4 level, diminutive in size and attenuated, likely related to occlusion/severe stenosis. No CTA evidence of aneurysm or dissection.  4/24/2023  - CT C/A/P: Mediastinal and right hilar lymphadenopathy. Nonspecific clustered nodules left lower lobe. No evidence of metastatic disease within the chest or abdomen. Heterogeneous vertebral body appearance which could be due to underlying degenerative changes. Correlation with nuclear bone scan may be of benefit.  - MRI brain: Multiple enhancing masses within the brain, largest ring-enhancing mass measuring 2.9 x 2.7 x 3.0 cm in the left periatrial lobe with moderate surrounding edema which demonstrates mass effect on the posterior horn of the left lateral ventricle. A 1.4 cm lesion is medially with mild surrounding edema and a 9 mm ring-enhancing lesion is seen in the right occipital cortex with minimal edema. 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  5/5/2023   -EBUS–guided FNA LN level 4R: Pathology positive for MOC-31, TTF-1, CK7 and negative for CDX2, CK20, synaptophysin and chromogranin c/w metastatic non-small cell carcinoma, favor adenocarcinoma of primary pulmonary origin. PD-L1 TPS 0%.  5/15-5/19/23  - WBRT, 20 cGy/5 fractions  6/15/2023   -C1 D1 pemetrexed, carboplatin AUC 5, pembrolizumab 200.   7/6/2023  -C2 D1  pemetrexed, carboplatin AUC 5, pembrolizumab      Allergies    No Known Allergies    Intolerances        MEDICATIONS  (STANDING):  budesonide  80 MICROgram(s)/formoterol 4.5 MICROgram(s) Inhaler 2 Puff(s) Inhalation two times a day  folic acid 1 milliGRAM(s) Oral daily  lactated ringers. 1000 milliLiter(s) (100 mL/Hr) IV Continuous <Continuous>  levETIRAcetam 500 milliGRAM(s) Oral two times a day  multivitamin 1 Tablet(s) Oral daily  pantoprazole    Tablet 40 milliGRAM(s) Oral before breakfast  tiotropium 2.5 MICROgram(s) Inhaler 2 Puff(s) Inhalation daily    MEDICATIONS  (PRN):  acetaminophen     Tablet .. 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 No nausea, vomiting, or hematemesis; No diarrhea or constipation. No melena or hematochezia.  GENITOURINARY: No dysuria, frequency or hematuria  NEUROLOGICAL: No numbness or weakness  SKIN: No itching, burning, rashes, or lesions   All other review of systems is negative unless indicated above.    Height (cm): 177.8 (07-13 @ 11:44)  Weight (kg): 53.1 (07-13 @ 11:44)  BMI (kg/m2): 16.8 (07-13 @ 11:44)  BSA (m2): 1.66 (07-13 @ 11:44)    T(F): 97.4 (07-14-23 @ 06:04), Max: 98.5 (07-13-23 @ 16:12)  HR: 70 (07-14-23 @ 06:04)  BP: 107/68 (07-14-23 @ 06:04)  RR: 18 (07-14-23 @ 06:04)  SpO2: 98% (07-14-23 @ 06:04)  Wt(kg): --    GENERAL: NAD, cachetic   HEAD:  Atraumatic, Normocephalic  EYES: EOMI, PERRLA, conjunctiva and sclera clear  NECK: Supple, No JVD  CHEST/LUNG: diminished b/l   HEART: Regular rate and rhythm; No murmurs, rubs, or gallops  ABDOMEN: Soft, Nontender, Nondistended; Bowel sounds present  EXTREMITIES:  2+ Peripheral Pulses, No clubbing, cyanosis, or edema  NEUROLOGY: paranoid, and mistrustful, poor historian, A/Ox3?  SKIN: No rashes or lesions                          10.7   1.14  )-----------( 111      ( 13 Jul 2023 13:06 )             30.7       07-13    140  |  109<H>  |  27<H>  ----------------------------<  108<H>  4.1   |  23  |  1.14    Ca    9.5      13 Jul 2023 13:45    TPro  7.9  /  Alb  3.4  /  TBili  1.2  /  DBili  x   /  AST  35  /  ALT  25  /  AlkPhos  72  07-13        < from: CT Head No Cont (07.13.23 @ 12:27) >  ACC: [id number] EXAM:  CT BRAIN   ORDERED BY: BROWN SCALES     PROCEDURE DATE:  07/13/2023          INTERPRETATION:  Clinical indication: Weakness    Multiple axial sections were performed from base of vertex without   contrast enhancement. Coronal and sagittal reconstructions were performed    This exam is compared with prior head CT performed on April 23, 2023.    Parenchymal volume loss and chronic microvascular changes are again seen.    Abnormal low-attenuation involving the left basal ganglia/corona radiata   region is again seen with ex vacuo dilatation of the left horn. This is   likely compatible with an old infarct.    Vasogenic edema involving the left frontal parietal region is again seen   with associated lesion involving the high left frontoparietal region   (2-44) this lesion measures approximately 2.4 x 2.0 cm and previously   measured approximately 2.4 x 2.0 cm.    Evaluation of the osseous structures with the appropriate window appears   unremarkable    The visualized paranasal sinuses mastoid and middle ear regions appear   clear.    IMPRESSION: Vasogenic edema with associated lesion again seen as   described above.    --- End of Report ---

## 2023-07-14 NOTE — CONSULT NOTE ADULT - PROBLEM SELECTOR RECOMMENDATION 5
assistance with ADLs  was living alone prior, likely needs more help now  recommend PT consult See GOC above.  Patient has HCP on file from 5/23 listing his sister, Michelle Deluca as primary HCP, does not appear to have decisional making capacity at present.  Asked Lyric to provide HCP if able for the chart.  Needs more help, plan for placement in a facility.  Discussed options of comfort and wishes regarding LST.  Palliative to follow up Monday.

## 2023-07-14 NOTE — CONSULT NOTE ADULT - PROBLEM SELECTOR RECOMMENDATION 3
unclear what patient understands about his medical history, agitated by questions such as "when is your birthday?" unable to obtain orientation, but does not know why he is hospitalized when asked  supportive care, promote sleep wake cycles and cluster care Clinical evidence indicates that the patient has Severe protein calorie malnutrition/ 3rd degree    In context of  Chronic Illness (>1 month)    Energy/Food intake <50% of estimated energy requirement >5 days  Body Fat loss: Severe   (Cachexia, temporal wasting, muscle atrophy)  Fluid Accumulation: Severe (Fluid overload, ascites, pleural effusions)   Strength: weakened   Recommend:   diet as tolerated, dietician consult placed consider protein supplement

## 2023-07-17 NOTE — PROGRESS NOTE ADULT - CONVERSATION DETAILS
Spoke with patient's sister, Michelle in person.  Discussed patient is not safe to live at home alone, requires more help and pending placement in [facility].  We discussed that while in [facility] would not be able to go for chemo or other treatments.  Expressed concern that even if patient should get a bit stronger, he has had a cognitive decline and home is not likely an option unless they are able to get him help at home.  She understands that he would not be able to follow up with the oncology team while patient is in rehab.  Discussed LST such as CPR, she said she needs to speak with more of his family, who has indicated they want everything to save his life.  She said she has 2 nephews who are doctors and she will see if one can speak with the other family members.  Explained interventions likely more harmful than beneficial given patient's metastatic disease with both cognitive and functional decline.  Offered family meeting as well.  Patient remains full code.

## 2023-07-17 NOTE — PROGRESS NOTE ADULT - NS ATTEND AMEND GEN_ALL_CORE FT
I have examined the patient at bedside and reviewed patient's data and participated in the management of the patient along with Miriam ARIAS as well as hemotology/med oncology faculty consisting of Dr. Bunny Kingsley, Dr. YASMIN Amaro, Dr. JEFF Lucio, Dr. Mani Alexis, Dr. Shanel Harris, Dr. Mau Tavera as well as myself during the daily heme/onc case review. I reviewed pertinent clinical information, PE,  labs as well as A/P as outline above.     Pt appears to declining in PS. Recommend palliative care evaluation for GOC and Hospice referral

## 2023-07-17 NOTE — PROGRESS NOTE ADULT - ASSESSMENT
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 DX- 5/2023 sp EBUS FNA LN BX. Patient has been seeing Dr. Dugan ([facility]). S/P WBRT 5/15-5/19 at [facility] then Started on systemic chemotherapy with pemetrexed, carbo AUC 5, and pembrolizumab on Vanessa 15. Last given tx for C2 D1 on 7/6/2023.   -patient having ongoing weakness and poor appetite lives alone. Brought to [facility] for FTT. As per chart review patient evaluated in past for capacity and sister She made HCP to help aide with decision making bc patient doesnt seem to have good insight.   -CT-H (7/13) showing Vasogenic edema with associated lesion- similar to previous scans, denies any neurological symptoms  -patient appears to be having more difficult time managing at home alone will need social work involved to help with longterm placement for patient when d/c'd that will allow for tx    -refereed to cancer care direct to help coordinate care   -palliative onboard for ongoing GOC- patient remains FC    #Neutropenia   -WBC=1.71  ANC=0.14  -Neutrophil %8  Lymphocytes-76%  -Monocytes %8   -denies N/V/D, chills, but does have weight loss  -afebrile  -Recent treatment chemo 7/6/23  -daily CBC with diff  -will discuss with attending karson the use of zarxio or ppx abx      #Normocytic anemia   -likely secondary to anti-neoplastic agents/ malig/ACD   -total iron-139, TIBC-219, %sat-64, ferritin-1501  -folate/G94-zircumnt,  -peripheral smear noted with slight schistocytes -retic 0.2,hapto-249  -maintain hgb >7 or if symptomatic     # Thrombocytopenia  Plt on admit-111 and today- 48  -likely r/t antineoplastic agents   no active bleeding/ecchymosis  Peripheral Smear-noted plt morphology NL  LFT's-,NL  -Daily CBC  -avoid non-essential meds that can exacerbate plt drop   -monitor plt count daily  -consider DDAVP if pt has bleeding post procedure  -Keep plt>10, Febrile in last 24hours >15K, LP>40K, Non-major bleeding >50K, Major bleeding >80K \            Will continue to monitor the patient.  Please call with any questions [phone]  Above reviewed with Attending Dr. WHITE/[facility]  [address] [facility], [address], [place]  [phone]  *Note not finalized until signed by Attending Physician        77 years old male with h/o demmentia, NSCLC with brain mets ( on chemo/radiotherapy), COPD, h/o alcohol use was brought in to ED with complain of generalized weakness and poor appetite.    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 DX- 5/2023 sp EBUS FNA LN BX. Patient has been seeing Dr. Dugan ([facility]). S/P WBRT 5/15-5/19 at [facility] then Started on systemic chemotherapy with pemetrexed, carbo AUC 5, and pembrolizumab on Vanessa 15. Last given tx for C2 D1 on 7/6/2023.   -patient having ongoing weakness and poor appetite lives alone. Brought to [facility] for FTT. As per chart review patient evaluated in past for capacity and sister She made HCP to help aide with decision making bc patient doesnt seem to have good insight.   -CT-H (7/13) showing Vasogenic edema with associated lesion- similar to previous scans, denies any neurological symptoms  -patient appears to be having more difficult time managing at home alone will need social work involved to help with longterm placement for patient when d/c'd that will allow for tx    -refereed to cancer care direct to help coordinate care   -palliative onboard for ongoing GOC- patient remains FC    #Neutropenia   -WBC=1.71  ANC=0.14  -Neutrophil %8  Lymphocytes-76%  -Monocytes %8   -denies N/V/D, chills, but does have weight loss  -afebrile  -Recent treatment chemo 7/6/23  -daily CBC with diff  -hold off on Zarxio and ppx abx  for now unless patient becomes febrile as per Dr. Haskins rec       #Normocytic anemia   -likely secondary to anti-neoplastic agents/ malig/ACD   -total iron-139, TIBC-219, %sat-64, ferritin-1501  -folate/D92-zzbxigsp,  -peripheral smear noted with slight schistocytes -retic 0.2,hapto-249  -maintain hgb >7 or if symptomatic     # Thrombocytopenia  Plt on admit-111 and today- 48  -likely r/t antineoplastic agents   no active bleeding/ecchymosis  Peripheral Smear-noted plt morphology NL  LFT's-,NL  -Daily CBC  -avoid non-essential meds that can exacerbate plt drop   -monitor plt count daily  -consider DDAVP if pt has bleeding post procedure  -Keep plt>10, Febrile in last 24hours >15K, LP>40K, Non-major bleeding >50K, Major bleeding >80K \            Will continue to monitor the patient.  Please call with any questions [phone]  Above reviewed with Attending Dr. WHITE/[facility]  [address] [facility], [address], [place], [place]  [phone]  *Note not finalized until signed by Attending Physician

## 2023-07-18 NOTE — PROGRESS NOTE ADULT - PROBLEM SELECTOR PROBLEM 3
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

## 2023-07-18 NOTE — DISCHARGE NOTE PROVIDER - HOSPITAL COURSE
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 Unable to get reliable history from patient and unable to reach family with number listed in EMR. Per ED note, sister stated that patient live alone, has generalized weakness, decrease oral intake and can no longer live independently. Under chemo/radiotherapy per sister. A&O x 2 at baseline.  Hemodynamically stable, afebrile, sat well at [facility]. WBC 1.14, plt 111, K 4.1, Cr 1.14, glucose 108, hsTnT 10.4. CT head Vasogenic edema involving the left frontal parietal region is again seen with associated lesion involving the high left frontoparietal region, this lesion measures approximately 2.4 x 2.0 cm.    Generalized weakness.   likely due to mets NSCLC and decrease oral intake  PT rec carina with possible LTC.    Severe protein-calorie malnutrition.    BMI 16.8.    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   NSCLC with brain mets ( on chemo/ brain radiotherapy), s/p dexamethasone taper per radiation oncology in 05/2023 admission at [facility]  CT head  ) with Vasogenic edema involving the left frontal parietal region is again seen with associated lesion involving the high left frontoparietal region, this lesion measures approximately 2.4 x 2.0 cm.  MRI brain on 4/24/23 with multiple enhancing masses within the brain, the largest ring enhancing mass measuring 2.9 x 2.7 x 3.0 cm in the LEFT parietal lobe with moderate surrounding edema which demonstrates mass effect on the posterior horn of the LEFT lateral ventricle. A 1.4 cm lesion is medially with mild surrounding edema and a 9 mm ring-enhancing lesion is seen in the RIGHT occipital cortex with minimal edema. 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 Will hold off steroid.   Oncology consulted  - Started on systemic chemotherapy with pemetrexed, carbo AUC 5, and pembrolizumab on Vanessa 15. Last given tx for C2 D1 on 7/6/2023.   - Consuella made HCP duwe to worsening confusion since radiation and chemo   Continue keppra 500mg bid    Chronic obstructive pulmonary disease, unspecified COPD type.   continue symbicort, spiriva  Not in exacerbation    Pancytopenia.   2/2 chemo   wbc 1k- slowly trending up   thrombocytopenia- trending down. no signs of bleeding  oncology on board   will trend  remains afebrile.    On  7/18/23 this case was reviewed with Dr. Paez, the patient is medically stable and optimized for discharge. 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 Unable to get reliable history from patient and unable to reach family with number listed in EMR. Per ED note, sister stated that patient live alone, has generalized weakness, decrease oral intake and can no longer live independently. Under chemo/radiotherapy per sister. A&O x 2 at baseline.  Hemodynamically stable, afebrile, sat well at RA. WBC 1.14, plt 111, K 4.1, Cr 1.14, glucose 108, hsTnT 10.4. CT head Vasogenic edema involving the left frontal parietal region is again seen with associated lesion involving the high left frontoparietal region, this lesion measures approximately 2.4 x 2.0 cm.    Generalized weakness.   likely due to mets NSCLC and decrease oral intake  PT rec carina with possible LTC.    Severe protein-calorie malnutrition.    BMI 16.8.    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   NSCLC with brain mets ( on chemo/ brain radiotherapy), s/p dexamethasone taper per radiation oncology in 05/2023 admission at [facility]  CT head  )   MRI brain on 4/24/23 with multiple enhancing masses within the   - Started on systemic chemotherapy with pemetrexed, carbo AUC 5, and pembrolizumab on Vanessa 15. Last given tx for C2 D1 on 7/6/2023.   Continue keppra 500mg bid    Chronic obstructive pulmonary disease, unspecified COPD type.   continue symbicort, spiriva    Pancytopenia.   2/2 chemo   wbc 1k- slowly trending up   thrombocytopenia- trending down. no signs of bleeding  oncology on board   will trend  remains afebrile.    On 7/18/23 this case was reviewed with Dr. Paez, the patient is medically stable and optimized for discharge. All medications were reviewed and appropriate prescriptions were sent to mutually agreed upon pharmacy.   Needs close monitoring of labs while at rehab

## 2023-07-18 NOTE — PROGRESS NOTE ADULT - REASON FOR ADMISSION
generalized weakness, severe malnutrition

## 2023-07-18 NOTE — PROGRESS NOTE ADULT - PROBLEM SELECTOR PLAN 3
NSCLC with brain mets ( on chemo/ brain radiotherapy), s/p dexamethasone taper per radiation oncology in 05/2023 admission at [facility]  CT head  ) with Vasogenic edema involving the left frontal parietal region is again seen with associated lesion involving the high left frontoparietal region, this lesion measures approximately 2.4 x 2.0 cm.  MRI brain on 4/24/23 with multiple enhancing masses within the brain, the largest ring enhancing mass measuring 2.9 x 2.7 x 3.0 cm in the LEFT parietal lobe with moderate surrounding edema which demonstrates mass effect on the posterior horn of the LEFT lateral ventricle. A 1.4 cm lesion is medially with mild surrounding edema and a 9 mm ring-enhancing lesion is seen in the RIGHT occipital cortex with minimal edema. 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 Will hold off steroid.   Oncology consulted  - Started on systemic chemotherapy with pemetrexed, carbo AUC 5, and pembrolizumab on Vanessa 15. Last given tx for C2 D1 on 7/6/2023.   - She made HCP to help aide with decision making bc patient doesnt seem to have good insight.   Continue keppra 500mg bid  Palliative care consult for GOC discussion
NSCLC with brain mets ( on chemo/ brain radiotherapy), s/p dexamethasone taper per radiation oncology in 05/2023 admission at [facility]  CT head  ) with Vasogenic edema involving the left frontal parietal region is again seen with associated lesion involving the high left frontoparietal region, this lesion measures approximately 2.4 x 2.0 cm.  MRI brain on 4/24/23 with multiple enhancing masses within the brain, the largest ring enhancing mass measuring 2.9 x 2.7 x 3.0 cm in the LEFT parietal lobe with moderate surrounding edema which demonstrates mass effect on the posterior horn of the LEFT lateral ventricle. A 1.4 cm lesion is medially with mild surrounding edema and a 9 mm ring-enhancing lesion is seen in the RIGHT occipital cortex with minimal edema. 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 Will hold off steroid.   Oncology consulted  - Started on systemic chemotherapy with pemetrexed, carbo AUC 5, and pembrolizumab on Vanessa 15. Last given tx for C2 D1 on 7/6/2023.   - She made HCP duwe to worsening confusion since radiation and chemo   Continue keppra 500mg bid  Palliative care consult for GOC discussion. remains full code
NSCLC with brain mets ( on chemo/ brain radiotherapy), s/p dexamethasone taper per radiation oncology in 05/2023 admission at [facility]  CT head  ) with Vasogenic edema involving the left frontal parietal region is again seen with associated lesion involving the high left frontoparietal region, this lesion measures approximately 2.4 x 2.0 cm.  MRI brain on 4/24/23 with multiple enhancing masses within the brain, the largest ring enhancing mass measuring 2.9 x 2.7 x 3.0 cm in the LEFT parietal lobe with moderate surrounding edema which demonstrates mass effect on the posterior horn of the LEFT lateral ventricle. A 1.4 cm lesion is medially with mild surrounding edema and a 9 mm ring-enhancing lesion is seen in the RIGHT occipital cortex with minimal edema. 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 Will hold off steroid.   Oncology consulted  - Started on systemic chemotherapy with pemetrexed, carbo AUC 5, and pembrolizumab on Vanessa 15. Last given tx for C2 D1 on 7/6/2023.   - She made HCP duwe to worsening confusion since radiation and chemo   Continue keppra 500mg bid  Palliative care consult for GOC discussion
NSCLC with brain mets ( on chemo/ brain radiotherapy), s/p dexamethasone taper per radiation oncology in 05/2023 admission at [facility]  CT head  ) with Vasogenic edema involving the left frontal parietal region is again seen with associated lesion involving the high left frontoparietal region, this lesion measures approximately 2.4 x 2.0 cm.  MRI brain on 4/24/23 with multiple enhancing masses within the brain, the largest ring enhancing mass measuring 2.9 x 2.7 x 3.0 cm in the LEFT parietal lobe with moderate surrounding edema which demonstrates mass effect on the posterior horn of the LEFT lateral ventricle. A 1.4 cm lesion is medially with mild surrounding edema and a 9 mm ring-enhancing lesion is seen in the RIGHT occipital cortex with minimal edema. 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 Will hold off steroid.   Oncology consulted  - Started on systemic chemotherapy with pemetrexed, carbo AUC 5, and pembrolizumab on Vanessa 15. Last given tx for C2 D1 on 7/6/2023.   - She made HCP duwe to worsening confusion since radiation and chemo   Continue keppra 500mg bid  Palliative care consult for GOC discussion
NSCLC with brain mets ( on chemo/ brain radiotherapy), s/p dexamethasone taper per radiation oncology in 05/2023 admission at [facility]  CT head  ) with Vasogenic edema involving the left frontal parietal region is again seen with associated lesion involving the high left frontoparietal region, this lesion measures approximately 2.4 x 2.0 cm.  MRI brain on 4/24/23 with multiple enhancing masses within the brain, the largest ring enhancing mass measuring 2.9 x 2.7 x 3.0 cm in the LEFT parietal lobe with moderate surrounding edema which demonstrates mass effect on the posterior horn of the LEFT lateral ventricle. A 1.4 cm lesion is medially with mild surrounding edema and a 9 mm ring-enhancing lesion is seen in the RIGHT occipital cortex with minimal edema. 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 Will hold off steroid.   Oncology consulted  - Started on systemic chemotherapy with pemetrexed, carbo AUC 5, and pembrolizumab on Vanessa 15. Last given tx for C2 D1 on 7/6/2023.   - She made HCP duwe to worsening confusion since radiation and chemo   Continue keppra 500mg bid  Palliative care consult for GOC discussion

## 2023-07-18 NOTE — DISCHARGE NOTE PROVIDER - NSDCCPCAREPLAN_GEN_ALL_CORE_FT
PRINCIPAL DISCHARGE DIAGNOSIS  Diagnosis: Adult failure to thrive  Assessment and Plan of Treatment:      PRINCIPAL DISCHARGE DIAGNOSIS  Diagnosis: Adult failure to thrive  Assessment and Plan of Treatment: 77 years old male with h/o demmentia, NSCLC with brain mets ( on chemo/radiotherapy), COPD, h/o alcohol use was brought in to ED with complain of generalized weakness and poor appetite. Unable to get reliable history from patient and unable to reach family with number listed in EMR. Per ED note, sister stated that patient live alone, has generalized weakness, decrease oral intake and can no longer live independently. Under chemo/radiotherapy per sister. A&O x 2 at baseline.  Hemodynamically stable, afebrile, sat well at [facility]. WBC 1.14, plt 111, K 4.1, Cr 1.14, glucose 108, hsTnT 10.4. CT head Vasogenic edema involving the left frontal parietal region is again seen with associated lesion involving the high left frontoparietal region, this lesion measures approximately 2.4 x 2.0 cm.  Generalized weakness.   likely due to mets NSCLC and decrease oral intake  PT rec carina with possible LTC.  Severe protein-calorie malnutrition.    BMI 16.8.  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   NSCLC with brain mets ( on chemo/ brain radiotherapy), s/p dexamethasone taper per radiation oncology in 05/2023 admission at [facility]  CT head  )   MRI brain on 4/24/23 with multiple enhancing masses within the   - Started on systemic chemotherapy with pemetrexed, carbo AUC 5, and pembrolizumab on Vanessa 15. Last given tx for C2 D1 on 7/6/2023.   Continue keppra 500mg bid  Chronic obstructive pulmonary disease, unspecified COPD type.   continue symbicort, spiriva  Pancytopenia.   2/2 chemo   wbc 1k- slowly trending up   thrombocytopenia- trending down. no signs of bleeding  oncology on board   will trend  remains afebrile.  Needs close monitoring of labs while at rehab

## 2023-07-18 NOTE — PROGRESS NOTE ADULT - NUTRITIONAL ASSESSMENT
This patient has been assessed with a concern for Malnutrition and has been determined to have a diagnosis/diagnoses of Severe protein-calorie malnutrition and Underweight (BMI < 19).    This patient is being managed with:   Diet Soft and Bite Sized-  Supplement Feeding Modality:  Oral  Ensure Plus High Protein Cans or Servings Per Day:  1       Frequency:  Three Times a day  Entered: Jul 14 2023  4:04PM  

## 2023-07-18 NOTE — PROGRESS NOTE ADULT - PROBLEM SELECTOR PLAN 1
likely due to mets NSCLC and decrease oral intake  PT consult  Likely need placement, S/W consult
likely due to mets NSCLC and decrease oral intake  PT rec carina with possible LTC
likely due to mets NSCLC and decrease oral intake  PT consult  Likely need placement, S/W consult
likely due to mets NSCLC and decrease oral intake  PT consult  Likely need placement, S/W consult
likely due to mets NSCLC and decrease oral intake  PT consult  need placement, S/W consult

## 2023-07-18 NOTE — DISCHARGE NOTE NURSING/CASE MANAGEMENT/SOCIAL WORK - PATIENT PORTAL LINK FT
You can access the FollowMyHealth Patient Portal offered by [facility] by registering at the following website: [email or web address]. By joining [facility]’s FollowMyHealth portal, you will also be able to view your health information using other applications (apps) compatible with our system.

## 2023-07-18 NOTE — DISCHARGE NOTE PROVIDER - DETAILS OF MALNUTRITION DIAGNOSIS/DIAGNOSES
This patient has been assessed with a concern for Malnutrition and was treated during this hospitalization for the following Nutrition diagnosis/diagnoses:     -  07/14/2023: Severe protein-calorie malnutrition   -  07/14/2023: Underweight (BMI < 19)

## 2023-07-18 NOTE — DISCHARGE NOTE PROVIDER - NSDCFUSCHEDAPPT_GEN_ALL_CORE_FT
[facility]  [facility]  Scheduled Appointment: 07/27/2023    [facility]  [facility]  Scheduled Appointment: 07/27/2023

## 2023-07-18 NOTE — PROGRESS NOTE ADULT - PROBLEM SELECTOR PLAN 6
2/2 chemo   wbc 1k- slowly trending up   thrombocytopenia- trending down. no signs of bleeding. will monitor.   oncology on board   will trend
2/2 chemo   wbc 1k- slowly trending up   thrombocytopenia- trending down. no signs of bleeding. will monitor.   oncology on board   will trend  remains afebrile
2/2 chemo   wbc 1k   oncology on board   will trend
2/2 chemo   wbc 1k- slowly trending up   thrombocytopenia- trending down. no signs of bleeding. will monitor.   oncology on board   will trend
2/2 chemo   wbc 1k- slowly trending up   thombocytopenia- trending down. no signs of bleeding. will monitor.   oncology on board   will trend

## 2023-07-18 NOTE — PROGRESS NOTE ADULT - PROBLEM SELECTOR PLAN 5
A&O x 2 at baseline  Supportive care  at risk for delirium

## 2023-07-18 NOTE — PROGRESS NOTE ADULT - PROBLEM SELECTOR PLAN 2
BMI 16.8
BMI 16.8  Nutrition consult  Encourage oral intake
BMI 16.8

## 2023-07-18 NOTE — DISCHARGE NOTE PROVIDER - NSDCMRMEDTOKEN_GEN_ALL_CORE_FT
budesonide-formoterol 80 mcg-4.5 mcg/inh inhalation aerosol: 2 puff(s) inhaled 2 times a day  folic acid 1 mg oral tablet: 1 tab(s) orally once a day  levETIRAcetam 500 mg oral tablet: 1 tab(s) orally 2 times a day  Multiple Vitamins oral tablet: 1 tab(s) orally once a day  nicotine 7 mg/24 hr transdermal film, extended release: 1 patch transdermal once a day  ondansetron 8 mg oral tablet, disintegratin orally as needed for  nausea  pantoprazole 40 mg oral delayed release tablet: 1 tab(s) orally once a day (before a meal)  prochlorperazine 10 mg oral tablet: 1 orally as needed for  nausea  tiotropium 2.5 mcg/inh inhalation aerosol: 2 puff(s) inhaled 2 times a day

## 2023-07-18 NOTE — PROGRESS NOTE ADULT - SUBJECTIVE AND OBJECTIVE BOX
Heme/Onc Progress note    INTERVAL HPI/OVERNIGHT EVENTS:  Patient S&E at bedside. No o/n events, patient resting comfortably. No complaints at this time. Patient denies fever, chills, dizziness, weakness, CP, palpitations, SOB, cough, N/V/D/C, dysuria, changes in bowel movements, LE edema.    VITAL SIGNS:  T(F): 97.6 (07-17-23 @ 05:13)  HR: 72 (07-17-23 @ 05:13)  BP: 104/72 (07-17-23 @ 05:13)  RR: 18 (07-17-23 @ 05:13)  SpO2: 96% (07-17-23 @ 05:13)  Wt(kg): --    PHYSICAL EXAM:    Constitutional: NAD, cachetic   Eyes: EOMI, sclera non-icteric  Neck: supple, no masses, no JVD  Respiratory: CTA b/l, good air entry b/l  Cardiovascular: RRR, no M/R/G  Gastrointestinal: soft, NTND, no masses palpable, + BS,   Extremities: no c/c/e  Neurological: AAOx2      MEDICATIONS  (STANDING):  budesonide  80 MICROgram(s)/formoterol 4.5 MICROgram(s) Inhaler 2 Puff(s) Inhalation two times a day  folic acid 1 milliGRAM(s) Oral daily  lactated ringers. 1000 milliLiter(s) (100 mL/Hr) IV Continuous <Continuous>  levETIRAcetam 500 milliGRAM(s) Oral two times a day  multivitamin 1 Tablet(s) Oral daily  pantoprazole    Tablet 40 milliGRAM(s) Oral before breakfast  tiotropium 2.5 MICROgram(s) Inhaler 2 Puff(s) Inhalation daily    MEDICATIONS  (PRN):  acetaminophen     Tablet .. 650 milliGRAM(s) Oral every 6 hours PRN Mild Pain (1 - 3), Moderate Pain (4 - 6)  melatonin 3 milliGRAM(s) Oral at bedtime PRN Insomnia      Allergies    No Known Allergies    Intolerances        LABS:                        10.3   1.71  )-----------( 48       ( 17 Jul 2023 08:40 )             29.5     07-16    138  |  105  |  13  ----------------------------<  105<H>  4.5   |  28  |  0.91    Ca    9.4      16 Jul 2023 14:40  Auto Neutrophil #: 0.14 K/uL  Auto Lymphocyte #: 1.30 K/uL  Auto Monocyte #: 0.14 K/uL  Auto Eosinophil #: 0.07 K/uL  Auto Basophil #: 0.00 K/uL  Auto Neutrophil %: 8.0: Differential percentages must be correlated with absolute numbers for   clinical significance. %  Auto Lymphocyte %: 76.0 %  Auto Monocyte %: 8.0 %  Auto Eosinophil %: 4.0 %  Auto Basophil %: 0.0 %  Nucleated RBC: N/A: Manual NRBC performed. /100 WBCs  Manual Differential (07.17.23 @ 08:40)   Tear Drops: Slight  Poikilocytosis: Slight  Schistocytes: Slight  Microcytosis: Slight  Hypochromia: Slight  Anisocytosis: Slight  Red Cell Morphology: Abnormal  Platelet Morphology: Normal  Reactive Lymphocytes %: 4.0 %  Manual Smear Verification: Performed  Nucleated RBC: 0 /100    Urinalysis Basic - ( 16 Jul 2023 14:40 )    Color: x / Appearance: x / SG: x / pH: x  Gluc: 105 mg/dL / Ketone: x  / Bili: x / Urobili: x   Blood: x / Protein: x / Nitrite: x   Leuk Esterase: x / RBC: x / WBC x   Sq Epi: x / Non Sq Epi: x / Bacteria: x        RADIOLOGY & ADDITIONAL TESTS:  Studies reviewed.    ASSESSMENT & PLAN: 
Patient is a 77y old  Male who presents with a chief complaint of generalized weakness, severe malnutrition (14 Jul 2023 09:25)      INTERVAL HPI/OVERNIGHT EVENTS: none    MEDICATIONS  (STANDING):  budesonide  80 MICROgram(s)/formoterol 4.5 MICROgram(s) Inhaler 2 Puff(s) Inhalation two times a day  folic acid 1 milliGRAM(s) Oral daily  lactated ringers. 1000 milliLiter(s) (100 mL/Hr) IV Continuous <Continuous>  levETIRAcetam 500 milliGRAM(s) Oral two times a day  multivitamin 1 Tablet(s) Oral daily  pantoprazole    Tablet 40 milliGRAM(s) Oral before breakfast  tiotropium 2.5 MICROgram(s) Inhaler 2 Puff(s) Inhalation daily    MEDICATIONS  (PRN):  acetaminophen     Tablet .. 650 milliGRAM(s) Oral every 6 hours PRN Mild Pain (1 - 3), Moderate Pain (4 - 6)  melatonin 3 milliGRAM(s) Oral at bedtime PRN Insomnia      Allergies    No Known Allergies    Intolerances        REVIEW OF SYSTEMS:  CONSTITUTIONAL: No fever, weight loss  EYES: No eye pain, visual disturbances, or discharge  ENMT:  No difficulty hearing, tinnitus, vertigo; No sinus or throat pain  RESPIRATORY: No cough, wheezing, chills or hemoptysis; No shortness of breath  CARDIOVASCULAR: No chest pain, palpitations, dizziness, or leg swelling  GASTROINTESTINAL: No abdominal or epigastric pain. No nausea, vomiting, or hematemesis; No diarrhea or constipation. No melena or hematochezia.  GENITOURINARY: No dysuria, frequency, hematuria, or incontinence  SKIN: No itching, burning, rashes, or lesions       Vital Signs Last 24 Hrs  T(C): 36.3 (16 Jul 2023 12:00), Max: 36.8 (15 Jul 2023 14:02)  T(F): 97.3 (16 Jul 2023 12:00), Max: 98.3 (15 Jul 2023 14:02)  HR: 65 (16 Jul 2023 12:00) (65 - 74)  BP: 96/60 (16 Jul 2023 12:00) (96/60 - 104/72)  BP(mean): --  RR: 17 (16 Jul 2023 12:00) (17 - 18)  SpO2: 98% (16 Jul 2023 12:00) (96% - 100%)    Parameters below as of 16 Jul 2023 12:00  Patient On (Oxygen Delivery Method): room air              PHYSICAL EXAM:  GENERAL: NAD  HEAD:  Atraumatic, Normocephalic  EYES: EOMI, PERRLA, conjunctiva and sclera clear  ENMT: No tonsillar erythema, exudates, or enlargement;   NECK: Supple, Normal thyroid  NERVOUS SYSTEM:  Alert & Oriented X2, Good concentration; Motor Strength 5/5 B/L upper and lower extremities; DTRs 2+ intact and symmetric  CHEST/LUNG: CTABL; No rales, rhonchi, wheezing, or rubs  HEART: Regular rate and rhythm; No murmurs, rubs, or gallops  ABDOMEN: Soft, Nontender, Nondistended; Bowel sounds present  EXTREMITIES:  2+ Peripheral Pulses, No clubbing, cyanosis, or edema  LYMPH: No lymphadenopathy noted  SKIN: No rashes or lesions    LABS:                                             9.4    1.28  )-----------( 64       ( 15 Jul 2023 06:09 )             27.0     07-15    140  |  105  |  17  ----------------------------<  84  3.7   |  29  |  0.90    Ca    9.0      15 Jul 2023 06:09        Urinalysis Basic - ( 15 Jul 2023 06:09 )    Color: x / Appearance: x / SG: x / pH: x  Gluc: 84 mg/dL / Ketone: x  / Bili: x / Urobili: x   Blood: x / Protein: x / Nitrite: x   Leuk Esterase: x / RBC: x / WBC x   Sq Epi: x / Non Sq Epi: x / Bacteria: x              CAPILLARY BLOOD GLUCOSE          RADIOLOGY & ADDITIONAL TESTS:    Imaging Personally Reviewed:  [x] YES  [ ] NO    Consultant(s) Notes Reviewed:  [ ] YES  [ ] NO    Care Discussed with Consultants/Other Providers [ ] YES  [ ] NO
Patient is a 77y old  Male who presents with a chief complaint of generalized weakness, severe malnutrition (14 Jul 2023 09:25)      INTERVAL HPI/OVERNIGHT EVENTS: none    MEDICATIONS  (STANDING):  budesonide  80 MICROgram(s)/formoterol 4.5 MICROgram(s) Inhaler 2 Puff(s) Inhalation two times a day  folic acid 1 milliGRAM(s) Oral daily  lactated ringers. 1000 milliLiter(s) (100 mL/Hr) IV Continuous <Continuous>  levETIRAcetam 500 milliGRAM(s) Oral two times a day  multivitamin 1 Tablet(s) Oral daily  pantoprazole    Tablet 40 milliGRAM(s) Oral before breakfast  tiotropium 2.5 MICROgram(s) Inhaler 2 Puff(s) Inhalation daily    MEDICATIONS  (PRN):  acetaminophen     Tablet .. 650 milliGRAM(s) Oral every 6 hours PRN Mild Pain (1 - 3), Moderate Pain (4 - 6)  melatonin 3 milliGRAM(s) Oral at bedtime PRN Insomnia      Allergies    No Known Allergies    Intolerances        REVIEW OF SYSTEMS:  CONSTITUTIONAL: No fever, weight loss  EYES: No eye pain, visual disturbances, or discharge  ENMT:  No difficulty hearing, tinnitus, vertigo; No sinus or throat pain  RESPIRATORY: No cough, wheezing, chills or hemoptysis; No shortness of breath  CARDIOVASCULAR: No chest pain, palpitations, dizziness, or leg swelling  GASTROINTESTINAL: No abdominal or epigastric pain. No nausea, vomiting, or hematemesis; No diarrhea or constipation. No melena or hematochezia.  GENITOURINARY: No dysuria, frequency, hematuria, or incontinence  SKIN: No itching, burning, rashes, or lesions       Vital Signs Last 24 Hrs  T(C): 36.6 (17 Jul 2023 11:26), Max: 36.6 (17 Jul 2023 11:26)  T(F): 97.9 (17 Jul 2023 11:26), Max: 97.9 (17 Jul 2023 11:26)  HR: 82 (17 Jul 2023 11:26) (71 - 82)  BP: 94/64 (17 Jul 2023 11:26) (91/58 - 104/72)  BP(mean): --  RR: 18 (17 Jul 2023 11:26) (17 - 18)  SpO2: 98% (17 Jul 2023 11:26) (96% - 99%)    Parameters below as of 17 Jul 2023 11:26  Patient On (Oxygen Delivery Method): room air                  PHYSICAL EXAM:  GENERAL: NAD  HEAD:  Atraumatic, Normocephalic  EYES: EOMI, PERRLA, conjunctiva and sclera clear  ENMT: No tonsillar erythema, exudates, or enlargement;   NECK: Supple, Normal thyroid  NERVOUS SYSTEM:  Alert & Oriented X2, Good concentration; Motor Strength 5/5 B/L upper and lower extremities; DTRs 2+ intact and symmetric  CHEST/LUNG: CTABL; No rales, rhonchi, wheezing, or rubs  HEART: Regular rate and rhythm; No murmurs, rubs, or gallops  ABDOMEN: Soft, Nontender, Nondistended; Bowel sounds present  EXTREMITIES:  2+ Peripheral Pulses, No clubbing, cyanosis, or edema  LYMPH: No lymphadenopathy noted  SKIN: No rashes or lesions    LABS:                                    10.3   1.71  )-----------( 48       ( 17 Jul 2023 08:40 )             29.5     07-16    138  |  105  |  13  ----------------------------<  105<H>  4.5   |  28  |  0.91    Ca    9.4      16 Jul 2023 14:40        Urinalysis Basic - ( 16 Jul 2023 14:40 )    Color: x / Appearance: x / SG: x / pH: x  Gluc: 105 mg/dL / Ketone: x  / Bili: x / Urobili: x   Blood: x / Protein: x / Nitrite: x   Leuk Esterase: x / RBC: x / WBC x   Sq Epi: x / Non Sq Epi: x / Bacteria: x                 RADIOLOGY & ADDITIONAL TESTS:    Imaging Personally Reviewed:  [x] YES  [ ] NO    Consultant(s) Notes Reviewed:  [ ] YES  [ ] NO    Care Discussed with Consultants/Other Providers [ ] YES  [ ] NO
Patient is a 77y old  Male who presents with a chief complaint of generalized weakness, severe malnutrition (14 Jul 2023 09:25)      INTERVAL HPI/OVERNIGHT EVENTS: none    MEDICATIONS  (STANDING):  budesonide  80 MICROgram(s)/formoterol 4.5 MICROgram(s) Inhaler 2 Puff(s) Inhalation two times a day  folic acid 1 milliGRAM(s) Oral daily  lactated ringers. 1000 milliLiter(s) (100 mL/Hr) IV Continuous <Continuous>  levETIRAcetam 500 milliGRAM(s) Oral two times a day  multivitamin 1 Tablet(s) Oral daily  pantoprazole    Tablet 40 milliGRAM(s) Oral before breakfast  tiotropium 2.5 MICROgram(s) Inhaler 2 Puff(s) Inhalation daily    MEDICATIONS  (PRN):  acetaminophen     Tablet .. 650 milliGRAM(s) Oral every 6 hours PRN Mild Pain (1 - 3), Moderate Pain (4 - 6)  melatonin 3 milliGRAM(s) Oral at bedtime PRN Insomnia      Allergies    No Known Allergies    Intolerances        REVIEW OF SYSTEMS:  CONSTITUTIONAL: No fever, weight loss  EYES: No eye pain, visual disturbances, or discharge  ENMT:  No difficulty hearing, tinnitus, vertigo; No sinus or throat pain  RESPIRATORY: No cough, wheezing, chills or hemoptysis; No shortness of breath  CARDIOVASCULAR: No chest pain, palpitations, dizziness, or leg swelling  GASTROINTESTINAL: No abdominal or epigastric pain. No nausea, vomiting, or hematemesis; No diarrhea or constipation. No melena or hematochezia.  GENITOURINARY: No dysuria, frequency, hematuria, or incontinence  SKIN: No itching, burning, rashes, or lesions       Vital Signs Last 24 Hrs  T(C): 36.4 (14 Jul 2023 11:13), Max: 36.9 (13 Jul 2023 16:12)  T(F): 97.6 (14 Jul 2023 11:13), Max: 98.5 (13 Jul 2023 16:12)  HR: 76 (14 Jul 2023 11:13) (68 - 76)  BP: 98/67 (14 Jul 2023 11:13) (98/67 - 111/69)  BP(mean): --  RR: 18 (14 Jul 2023 11:13) (17 - 18)  SpO2: 98% (14 Jul 2023 11:13) (98% - 100%)    Parameters below as of 14 Jul 2023 11:13  Patient On (Oxygen Delivery Method): room air        PHYSICAL EXAM:  GENERAL: NAD  HEAD:  Atraumatic, Normocephalic  EYES: EOMI, PERRLA, conjunctiva and sclera clear  ENMT: No tonsillar erythema, exudates, or enlargement;   NECK: Supple, Normal thyroid  NERVOUS SYSTEM:  Alert & Oriented X2, Good concentration; Motor Strength 5/5 B/L upper and lower extremities; DTRs 2+ intact and symmetric  CHEST/LUNG: CTABL; No rales, rhonchi, wheezing, or rubs  HEART: Regular rate and rhythm; No murmurs, rubs, or gallops  ABDOMEN: Soft, Nontender, Nondistended; Bowel sounds present  EXTREMITIES:  2+ Peripheral Pulses, No clubbing, cyanosis, or edema  LYMPH: No lymphadenopathy noted  SKIN: No rashes or lesions    LABS:                        11.5   1.12  )-----------( 78       ( 14 Jul 2023 10:12 )             32.1     07-14    140  |  108  |  24<H>  ----------------------------<  186<H>  3.9   |  25  |  1.12    Ca    9.2      14 Jul 2023 10:12  Phos  2.7     07-14  Mg     1.7     07-14    TPro  7.6  /  Alb  3.3  /  TBili  0.7  /  DBili  x   /  AST  37  /  ALT  27  /  AlkPhos  76  07-14      Urinalysis Basic - ( 14 Jul 2023 10:12 )    Color: x / Appearance: x / SG: x / pH: x  Gluc: 186 mg/dL / Ketone: x  / Bili: x / Urobili: x   Blood: x / Protein: x / Nitrite: x   Leuk Esterase: x / RBC: x / WBC x   Sq Epi: x / Non Sq Epi: x / Bacteria: x      CAPILLARY BLOOD GLUCOSE          RADIOLOGY & ADDITIONAL TESTS:    Imaging Personally Reviewed:  [x] YES  [ ] NO    Consultant(s) Notes Reviewed:  [ ] YES  [ ] NO    Care Discussed with Consultants/Other Providers [ ] YES  [ ] NO
Patient is a 77y old  Male who presents with a chief complaint of generalized weakness, severe malnutrition (14 Jul 2023 09:25)      INTERVAL HPI/OVERNIGHT EVENTS: none    MEDICATIONS  (STANDING):  budesonide  80 MICROgram(s)/formoterol 4.5 MICROgram(s) Inhaler 2 Puff(s) Inhalation two times a day  folic acid 1 milliGRAM(s) Oral daily  lactated ringers. 1000 milliLiter(s) (100 mL/Hr) IV Continuous <Continuous>  levETIRAcetam 500 milliGRAM(s) Oral two times a day  multivitamin 1 Tablet(s) Oral daily  pantoprazole    Tablet 40 milliGRAM(s) Oral before breakfast  tiotropium 2.5 MICROgram(s) Inhaler 2 Puff(s) Inhalation daily    MEDICATIONS  (PRN):  acetaminophen     Tablet .. 650 milliGRAM(s) Oral every 6 hours PRN Mild Pain (1 - 3), Moderate Pain (4 - 6)  melatonin 3 milliGRAM(s) Oral at bedtime PRN Insomnia      Allergies    No Known Allergies    Intolerances        REVIEW OF SYSTEMS:  CONSTITUTIONAL: No fever, weight loss  EYES: No eye pain, visual disturbances, or discharge  ENMT:  No difficulty hearing, tinnitus, vertigo; No sinus or throat pain  RESPIRATORY: No cough, wheezing, chills or hemoptysis; No shortness of breath  CARDIOVASCULAR: No chest pain, palpitations, dizziness, or leg swelling  GASTROINTESTINAL: No abdominal or epigastric pain. No nausea, vomiting, or hematemesis; No diarrhea or constipation. No melena or hematochezia.  GENITOURINARY: No dysuria, frequency, hematuria, or incontinence  SKIN: No itching, burning, rashes, or lesions       Vital Signs Last 24 Hrs  T(C): 36.3 (15 Jul 2023 05:30), Max: 36.7 (15 Jul 2023 00:14)  T(F): 97.3 (15 Jul 2023 05:30), Max: 98 (15 Jul 2023 00:14)  HR: 68 (15 Jul 2023 05:30) (65 - 76)  BP: 101/60 (15 Jul 2023 05:30) (92/62 - 102/67)  BP(mean): --  RR: 18 (15 Jul 2023 05:30) (17 - 18)  SpO2: 97% (15 Jul 2023 05:30) (97% - 98%)    Parameters below as of 15 Jul 2023 00:14  Patient On (Oxygen Delivery Method): room air          PHYSICAL EXAM:  GENERAL: NAD  HEAD:  Atraumatic, Normocephalic  EYES: EOMI, PERRLA, conjunctiva and sclera clear  ENMT: No tonsillar erythema, exudates, or enlargement;   NECK: Supple, Normal thyroid  NERVOUS SYSTEM:  Alert & Oriented X2, Good concentration; Motor Strength 5/5 B/L upper and lower extremities; DTRs 2+ intact and symmetric  CHEST/LUNG: CTABL; No rales, rhonchi, wheezing, or rubs  HEART: Regular rate and rhythm; No murmurs, rubs, or gallops  ABDOMEN: Soft, Nontender, Nondistended; Bowel sounds present  EXTREMITIES:  2+ Peripheral Pulses, No clubbing, cyanosis, or edema  LYMPH: No lymphadenopathy noted  SKIN: No rashes or lesions    LABS:                                   9.4    1.28  )-----------( 64       ( 15 Jul 2023 06:09 )             27.0     07-15    140  |  105  |  17  ----------------------------<  84  3.7   |  29  |  0.90    Ca    9.0      15 Jul 2023 06:09  Phos  2.7     07-14  Mg     1.7     07-14    TPro  7.6  /  Alb  3.3  /  TBili  0.7  /  DBili  x   /  AST  37  /  ALT  27  /  AlkPhos  76  07-14      Urinalysis Basic - ( 15 Jul 2023 06:09 )    Color: x / Appearance: x / SG: x / pH: x  Gluc: 84 mg/dL / Ketone: x  / Bili: x / Urobili: x   Blood: x / Protein: x / Nitrite: x   Leuk Esterase: x / RBC: x / WBC x   Sq Epi: x / Non Sq Epi: x / Bacteria: x          CAPILLARY BLOOD GLUCOSE          RADIOLOGY & ADDITIONAL TESTS:    Imaging Personally Reviewed:  [x] YES  [ ] NO    Consultant(s) Notes Reviewed:  [ ] YES  [ ] NO    Care Discussed with Consultants/Other Providers [ ] YES  [ ] NO
Patient is a 77y old  Male who presents with a chief complaint of generalized weakness, severe malnutrition (14 Jul 2023 09:25)      INTERVAL HPI/OVERNIGHT EVENTS: none    MEDICATIONS  (STANDING):  budesonide  80 MICROgram(s)/formoterol 4.5 MICROgram(s) Inhaler 2 Puff(s) Inhalation two times a day  folic acid 1 milliGRAM(s) Oral daily  lactated ringers. 1000 milliLiter(s) (100 mL/Hr) IV Continuous <Continuous>  levETIRAcetam 500 milliGRAM(s) Oral two times a day  multivitamin 1 Tablet(s) Oral daily  pantoprazole    Tablet 40 milliGRAM(s) Oral before breakfast  tiotropium 2.5 MICROgram(s) Inhaler 2 Puff(s) Inhalation daily    MEDICATIONS  (PRN):  acetaminophen     Tablet .. 650 milliGRAM(s) Oral every 6 hours PRN Mild Pain (1 - 3), Moderate Pain (4 - 6)  melatonin 3 milliGRAM(s) Oral at bedtime PRN Insomnia      Allergies    No Known Allergies    Intolerances        REVIEW OF SYSTEMS:  CONSTITUTIONAL: No fever, weight loss  EYES: No eye pain, visual disturbances, or discharge  ENMT:  No difficulty hearing, tinnitus, vertigo; No sinus or throat pain  RESPIRATORY: No cough, wheezing, chills or hemoptysis; No shortness of breath  CARDIOVASCULAR: No chest pain, palpitations, dizziness, or leg swelling  GASTROINTESTINAL: No abdominal or epigastric pain. No nausea, vomiting, or hematemesis; No diarrhea or constipation. No melena or hematochezia.  GENITOURINARY: No dysuria, frequency, hematuria, or incontinence  SKIN: No itching, burning, rashes, or lesions       Vital Signs Last 24 Hrs  T(C): 36.3 (18 Jul 2023 10:07), Max: 36.8 (17 Jul 2023 17:09)  T(F): 97.3 (18 Jul 2023 10:07), Max: 98.3 (17 Jul 2023 17:09)  HR: 69 (18 Jul 2023 10:07) (66 - 69)  BP: 99/66 (18 Jul 2023 10:07) (99/57 - 103/61)  BP(mean): --  RR: 18 (18 Jul 2023 10:07) (18 - 18)  SpO2: 96% (18 Jul 2023 10:07) (96% - 99%)    Parameters below as of 18 Jul 2023 10:07  Patient On (Oxygen Delivery Method): room air                      PHYSICAL EXAM:  GENERAL: NAD  HEAD:  Atraumatic, Normocephalic  EYES: EOMI, PERRLA, conjunctiva and sclera clear  ENMT: No tonsillar erythema, exudates, or enlargement;   NECK: Supple, Normal thyroid  NERVOUS SYSTEM:  Alert & Oriented X2, Good concentration; Motor Strength 5/5 B/L upper and lower extremities; DTRs 2+ intact and symmetric  CHEST/LUNG: CTABL; No rales, rhonchi, wheezing, or rubs  HEART: Regular rate and rhythm; No murmurs, rubs, or gallops  ABDOMEN: Soft, Nontender, Nondistended; Bowel sounds present  EXTREMITIES:  2+ Peripheral Pulses, No clubbing, cyanosis, or edema  LYMPH: No lymphadenopathy noted  SKIN: No rashes or lesions    LABS:                                   9.4    1.40  )-----------( 51       ( 18 Jul 2023 06:20 )             26.1     07-16    138  |  105  |  13  ----------------------------<  105<H>  4.5   |  28  |  0.91    Ca    9.4      16 Jul 2023 14:40        Urinalysis Basic - ( 16 Jul 2023 14:40 )    Color: x / Appearance: x / SG: x / pH: x  Gluc: 105 mg/dL / Ketone: x  / Bili: x / Urobili: x   Blood: x / Protein: x / Nitrite: x   Leuk Esterase: x / RBC: x / WBC x   Sq Epi: x / Non Sq Epi: x / Bacteria: x                   RADIOLOGY & ADDITIONAL TESTS:    Imaging Personally Reviewed:  [x] YES  [ ] NO    Consultant(s) Notes Reviewed:  [ ] YES  [ ] NO    Care Discussed with Consultants/Other Providers [ ] YES  [ ] NO

## 2023-07-18 NOTE — CHART NOTE - NSCHARTNOTEFT_GEN_A_CORE
Spoke with patient's sister and HCP, Michelle.  She is aware patient is being [facility].  Explained that they can fill out MOLST form indicating wishes regarding LST such as CPR with team at the rehab.  Michelle is aware and will address with family.

## 2023-07-18 NOTE — DISCHARGE NOTE NURSING/CASE MANAGEMENT/SOCIAL WORK - NSDCPEFALRISK_GEN_ALL_CORE
For information on Fall & Injury Prevention, visit: [email or web address] OR  [email or web address] OR  https://www.cdc.gov/steadi/patient.html

## 2023-07-18 NOTE — DISCHARGE NOTE PROVIDER - NSDCCPGOAL_GEN_ALL_CORE_FT
August 15, 2019      [facility] [address]  [address]  [facility] [address]  Phone: [phone]  Fax: [phone]       Patient: Melita Shell   YOB: 1987  Date of Visit: 08/15/2019    To Whom It May Concern:    Joshua Shell  was at [facility] on 08/15/2019. She may return to work/school on 8/19/19 with restrictions- light duty with no strenuous activity. If you have any questions or concerns, or if I can be of further assistance, please do not hesitate to contact me.    Sincerely,    Abby Kennedy NP     
To get better and follow your care plan as instructed.

## 2023-07-25 PROBLEM — C34.91 CANCER OF RIGHT LUNG: Status: ACTIVE | Noted: 2023-01-01

## 2023-07-25 NOTE — REVIEW OF SYSTEMS
[Fatigue] : fatigue [Recent Change In Weight] : ~T recent weight change [Diarrhea: Grade 0] : Diarrhea: Grade 0 [Negative] : Neurological [FreeTextEntry2] : Losing weight [de-identified] : Recent altered mentation

## 2023-07-25 NOTE — HISTORY OF PRESENT ILLNESS
[Home] : at home, [unfilled] , at the time of the visit. [Medical Office: ([facility])___] : at the medical office located in  [Disease: _____________________] : Disease: [unfilled] [M: ___] : M[unfilled] [AJCC Stage: ____] : AJCC Stage: [unfilled] [de-identified] : 77M, of [place] descent ( born in [place]), heavy tobacco smoker, PMH COPD, HTN, chronic alcohol abuse, memory loss, referred for radical oncology consultation of lung cancer.  \par \par CASE SYNOPSIS:\par 4/23/2023 CT head no contrast: Left frontoparietal mass with marked vasogenic edema; further evaluation with contrast–enhanced MRI recommended.  Started on dexamethasone and Keppra.\par \par 4/23/2023 CT brain perfusion/CT angiogram head and neck: Abnormal perfusion left cerebral white matter, most likely related to vasogenic edema.  Left vertebral artery not visualized until approximately C4 level, diminutive in size and attenuated, likely related to occlusion/severe stenosis.  No CTA evidence of aneurysm or dissection.\par \par 4/24/2023 CT C/A/P: Mediastinal and right hilar lymphadenopathy.  Nonspecific clustered nodules left lower lobe.  No evidence of metastatic disease within the chest or abdomen.  Heterogeneous vertebral body appearance which could be due to underlying degenerative changes.  Correlation with nuclear bone scan may be of benefit.\par \par 4/24/2023 MRI brain: Multiple enhancing masses within the brain, largest ring-enhancing mass measuring 2.9 x 2.7 x 3.0 cm in the left periatrial lobe with moderate surrounding edema which demonstrates mass effect on the posterior horn of the left lateral ventricle.  A 1.4 cm lesion is medially with mild surrounding edema and a 9 mm ring-enhancing lesion is seen in the right occipital cortex with minimal edema.  Mild periventricular white matter ischemia noted with old infarctions in the left basal ganglia.\par \par 5/5/2023 EBUS–guided FNA LN level 4R: Pathology positive for MOC-31, TTF-1, CK7 and negative for CDX2, CK20, synaptophysin and chromogranin c/w metastatic non-small cell carcinoma, favor adenocarcinoma of primary pulmonary origin.  PD-L1 TPS 0%.\par \par  5/15-5/19/23: WBRT, 20 cGy/5 fractions\par \par 7/13/23 CT head: Vasogenic edema involving left frontoparietal region with associated lesions involving the high left frontoparietal region unchanged in size from April 23, 2023.\par \par 6/15/2023 C1 D1 pemetrexed, carboplatin AUC 5, pembrolizumab 200\par \par 7/3-7/7/23: Hospitalized at [facility] with altered mentation, metabolic encephalopathy, cytopenias secondary to systemic chemotherapy.  After being stabilized, patient discharged at [facility]. [de-identified] : Favor adenocarcinoma [de-identified] : PD-L1 TPS 0% [FreeTextEntry1] :  systemic chemotherapy with pemetrexed/carboplatin/pembrolizumab [de-identified] : I reviewed recent events with patient's sister, Michelle Deluca, during today's TTM.\par Patient was recently (7/3-7/7/23) hospitalized at [facility] with altered mentation, metabolic encephalopathy, cytopenias secondary to systemic chemotherapy.  \par After being stabilized, patient discharged at [facility], where he is reportedly thriving with adequate hydration and caloric intake.\par Patient decompensated after 7/6/2023 C2 D1 pemetrexed/carboplatin/pembrolizumab.  At home reportedly he was not eating and not hydrating, which led to decompensation and hospitalization.  A CT head performed 7/13/23 described vasogenic edema involving left frontoparietal region with associated lesions involving the high left frontoparietal region unchanged in size from April 23, 2023.\par \par \par \par

## 2023-08-08 NOTE — ED PROVIDER NOTE - CLINICAL SUMMARY MEDICAL DECISION MAKING FREE TEXT BOX
79yo male with pmh metastatic lung ca, copd, asthma, p/w unresponsiveness, hypoxia.  Arrives on 15L NRB, unresponsive to verbal, touch.  Unable to obtain bp but with organized apparent nsr on monitor.  Briefly after getting transferred over into stretcher started becoming bradycardic and eventually asystole.  Prior to this I spoke with sister She Joseph and confirmed DNR/ DNI - patient arrives with christina in papers from [facility].  TOD 9:59.

## 2023-08-08 NOTE — ED PROVIDER NOTE - CRITICAL CARE ATTENDING CONTRIBUTION TO CARE
Upon my evaluation, this patient had a high probability of imminent or life-threatening deterioration due to hypoxic respiratory failure, which required my direct attention, intervention, and personal management.  The patient has a  medical condition that impairs one or more vital organ systems.  Frequent personal assessment and adjustment of medical interventions was performed.      I have personally provided 30 minutes of critical care time exclusive of time spent on separately billable procedures. Time includes review of laboratory data, radiology results, discussion with consultants, patient and family; monitoring for potential decompensation, as well as time spent retrieving data and reviewing the chart and documenting the visit. Interventions were performed as documented above.

## 2023-08-08 NOTE — ED ADULT TRIAGE NOTE - CHIEF COMPLAINT QUOTE
as per ems, pt sent for NH for hypoxia, covid + x 1 week, o2 sat 80's on non rebreather. as per ems, pt sent for NH for hypoxia, covid + x 1 week, o2 sat 80's on non rebreather. unable to obtain a blood pressure, temp in triage. DNR/DNI in chart from nursing home.

## 2023-08-08 NOTE — ED PROVIDER NOTE - PHYSICAL EXAMINATION
General appearance: Unresponsive, cachectic    Eyes: anicteric sclerae, KRISTIN, EOMI   HENT: Atraumatic; oropharynx clear, dry mm and no ulcerations, no pharyngeal erythema or exudate   Neck: Trachea midline; Full range of motion, supple   Pulm: Agonal respirations, tachypneic   CV: RRR, No murmurs, rubs, or gallops   Abdomen: Soft, non-tender, non-distended; no guarding or rebound   Extremities: No peripheral edema, no gross deformities, FROM x4   Skin: Dry, cool, normal turgor and texture; no rash

## 2023-08-08 NOTE — ED ADULT NURSE NOTE - OBJECTIVE STATEMENT
Patient was brought in by ambulance in 15L NRB, unable to get o2 sat and BP. Patient is unresponsive, eye open-pupils not reactive to light, in agonal breathing. Dr. Mckeon at bedside. DNR/DNI. Family notified by Dr. Mckeon. Time of Death 9.59am.

## 2023-08-15 ENCOUNTER — APPOINTMENT (OUTPATIENT)
Dept: OPHTHALMOLOGY | Facility: CLINIC | Age: 78
End: 2023-08-15

## 2023-08-22 ENCOUNTER — APPOINTMENT (OUTPATIENT)
Dept: PULMONOLOGY | Facility: CLINIC | Age: 78
End: 2023-08-22

## 2023-10-30 NOTE — PATIENT PROFILE ADULT - PACKS YRS CALCULATION
PT Name: Aquiles Montana  MR #: [id number]     DOCUMENTATION CLARIFICATION     CDS: Radha Whitt RN         Contact information:[email or web address] or (cell) [phone]     This form is a permanent document in the medical record.     Query Date: October 30, 2023    By submitting this query, we are merely seeking further clarification of documentation.  Please utilize your independent clinical judgment when addressing the question(s) below.  Provider, please provide the integumentary diagnosis related to the documentation of Left Sacrum:   The Medical Record contains the following:    Wound Care Consult 10/25  L sacrum- Stage 3 pressure injury- present on admit with non blanchable redness over sacrum and bilateral buttocks  1x0.5x0.2cm- slough/pink tissue- scant serosanguinous drainage        - Triad ointment to L sacrum wound BID         The clinical guidelines noted are only a system guideline. It does not replace the providers clinical judgment.    Per the National Pressure Injury Advisory Panel:   A pressure injury is localized damage to the skin and underlying soft tissue usually over a bony prominence or related to a medical or other device. The injury can present as intact skin or an open ulcer and may be painful. The injury occurs as a result of intense and/or prolonged pressure or pressure in combination with shear. The tolerance of soft tissue for pressure and shear may also be affected by microclimate, nutrition, perfusion, co-morbidities and condition of the soft tissue.       Stage 1 Pressure Injury:  Intact skin with a localized area of non-blanchable erythema, which may appear differently in darkly pigmented skin. Color changes do not include purple or maroon discoloration; these may indicate deep tissue pressure injury.    Stage 2 Pressure Injury:  Partial-thickness loss of skin with exposed dermis. The wound bed is viable, pink or red, moist, and may also present as an intact or ruptured  serum-filled blister.    Stage 3 Pressure Injury:  Full-thickness loss of skin, in which adipose (fat) is visible in the ulcer and granulation tissue and epibole (rolled wound edges) are often present. Slough and/or eschar may be visible. Undermining and tunneling may occur.    Stage 4 Pressure Injury:  Full-thickness skin and tissue loss with exposed or directly palpable fascia, muscle, tendon, ligament, cartilage or bone in the ulcer. Slough and/or eschar may be visible. Epibole (rolled edges), undermining and/or tunneling often occur.    Unstageable Pressure Injury:  Full-thickness skin and tissue loss in which the extent of tissue damage within the ulcer cannot be confirmed because it is obscured by slough or eschar. If slough or eschar is removed, a Stage 3 or Stage 4 pressure injury will be revealed.    Deep Tissue Pressure Injury:  Intact or non-intact skin with localized area of persistent non-blanchable deep red, maroon, purple discoloration or epidermal separation revealing a dark wound bed or blood filled blister. This injury results from intense and/or prolonged pressure and shear forces at the bone-muscle interface. The wound may evolve rapidly to reveal the actual extent of tissue injury, or may resolve without tissue loss. If necrotic tissue, subcutaneous tissue, granulation tissue, fascia, muscle or other underlying structures are visible, this indicates a full thickness pressure injury (Unstageable, Stage 3 or Stage 4). Do not use DTPI to describe vascular, traumatic, neuropathic, or dermatologic conditions.   Medical Device Related Pressure Injury: This describes an etiology. Medical device related pressure injuries result from the use of devices designed and applied for diagnostic or therapeutic purposes. The resultant pressure injury generally conforms to the pattern or shape of the device. The injury should be staged using the staging system.    Mucosal Membrane Pressure Injury: Mucosal membrane  pressure injury is found on mucous membranes with a history of a medical device in use at the location of the injury. Due to the anatomy of the tissue these ulcers cannot be staged.       Provider, please provide the integumentary diagnosis related to the documentation of Left Sacrum:     [   x] Pressure Injury/Decubitus Ulcer, Stage 3     [   ] Other Integumentary Diagnosis (please specify):______________         Please document in your progress notes daily for the duration of treatment until resolved and include in your discharge summary.    Reference:    KYLE hTao., TODD Urbano., Goldberg, M., MATHIEU Marsh., KYLE Purdy, & TONY Perez. (2016). Revised National Pressure Ulcer Advisory Panel Pressure Injury Staging System: Revised Pressure Injury Staging System. J Wound Ostomy Continence Nurs, 43(6), 585-597. doi:10.1097/won.0053096912674174    Form No.[id number]   40

## 2023-12-09 NOTE — ED ADULT TRIAGE NOTE - NS ED TRIAGE AVPU SCALE
Alert-The patient is alert, awake and responds to voice. The patient is oriented to time, place, and person. The triage nurse is able to obtain subjective information.
1 pair

## 2024-01-16 NOTE — PHYSICAL THERAPY INITIAL EVALUATION ADULT - STRENGTHENING, PT EVAL
RHEUMATOLOGY FOLLOW UP NOTE    Patient ID: Jovanna is a 69 year old female.    Chief Complaint   Patient presents with    Office Visit     *f/u osteoporosis start prolia       HPI  Jovanna is here today for follow-up.    Osteoporosis   DEXA 2019  No personal history of fractures, no family history of fractures, no smoking or steroid use. Was on alendronate for 2-3 years until stopping due to concerns of side effects and fatigue. She is on Vit D and calcium supplementation.     Last visit,   Plan to start prolia    Today,   Arrives in stable health  She is on Vit D and calcium supplementation     Review of Systems   Constitutional:  Negative for fever.   HENT:  Negative for mouth sores.    Eyes:  Negative for pain and redness.   Respiratory:  Negative for shortness of breath.    Cardiovascular:  Negative for chest pain.   Gastrointestinal:  Negative for blood in stool.   Genitourinary:  Negative for hematuria.   Musculoskeletal:  Negative for arthralgias and joint swelling.   Neurological:  Negative for numbness.   Hematological:  Negative for adenopathy.   Psychiatric/Behavioral:  Negative for agitation.      #Osteoporosis     Labs:  (+):  (-):    Imaging:    DEXA 12/2023    LUMBAR SPINE:  0.691 gm/cm2, T-score = -3.2     LEFT FEMORAL NECK:  0.552 gm/cm2, T-score = -2.7      Past Medical History:   Diagnosis Date    Anxiety     Cervical radiculopathy     Migraines        Family History   Problem Relation Age of Onset    Diabetes Mother     Heart disease Father      Social History     Tobacco Use    Smoking status: Never     Passive exposure: Never    Smokeless tobacco: Never   Vaping Use    Vaping Use: never used   Substance Use Topics    Alcohol use: Never    Drug use: Never     Current Outpatient Medications   Medication Sig Dispense Refill    butalbital-aspirin-caffeine (FIORINAL) per tablet Take 1 tablet by mouth every 4 hours as needed for Pain. 30 tablet 0    loratadine (CLARITIN) 10 MG tablet Take 1 tablet by  mouth daily. 30 tablet 1    triamcinolone (ARISTOCORT) 0.1 % cream Apply topically 2 times daily. 45 g 1    ACETAMINOPHEN PO        No current facility-administered medications for this visit.     ALLERGIES:  No Known Allergies        BP (!) 90/51   Pulse 62   Temp 98.3 °F (36.8 °C) (Temporal)   Ht 5' 2\" (1.575 m)   Wt 46.6 kg (102 lb 11.8 oz)   LMP  (LMP Unknown)   BMI 18.79 kg/m²   BSA 1.44 m²   Physical Exam  Constitutional:       Appearance: Normal appearance. She is not ill-appearing.   HENT:      Head: Normocephalic and atraumatic.   Eyes:      General: No scleral icterus.     Conjunctiva/sclera: Conjunctivae normal.   Cardiovascular:      Rate and Rhythm: Normal rate and regular rhythm.   Pulmonary:      Effort: Pulmonary effort is normal. No respiratory distress.   Abdominal:      Palpations: Abdomen is soft.      Tenderness: There is no abdominal tenderness.   Musculoskeletal:         General: No swelling or tenderness. Normal range of motion.   Lymphadenopathy:      Cervical: No cervical adenopathy.   Skin:     General: Skin is warm.      Findings: No rash.   Neurological:      General: No focal deficit present.      Mental Status: She is alert and oriented to person, place, and time.   Psychiatric:         Mood and Affect: Mood normal.           ASSESSMENT AND PLAN    Problem List Items Addressed This Visit          Endocrine and Metabolic    Age-related osteoporosis without current pathological fracture - Primary     In summary,      68 year old F with osteoporosis diagnosed via DEXA in 2019 - no personal history of fractures, no family history of fractures, no smoking or steroid use. Was on alendronate for 2-3 years until stopping due to concerns of side effects and fatigue. She is on Vit D and calcium supplementation.      Plan to start prolia, she is aware this medicine cannot be stopped abruptly due to risk of paradoxical fracture and should she we need to stop in the future she would have to  transition to another medicine, likely a bisphosphonate. DEXA has been updated - working with insurance to see if this medicine can be covered.         Relevant Orders    Basic Metabolic Panel    Vitamin D -25 Hydroxy    [facility] [place]        Orders Placed This Encounter    Basic Metabolic Panel    Vitamin D -25 Hydroxy    [facility] [place]     prolia     Referral Priority:   Routine     Referral Type:   Consult & Treatment     Number of Visits Requested:   1     Expiration Date:   1/17/2025        Sushil Carrillo MD  Clinical    [employer]   [facility] - Rheumatology     Return in about 6 months (around 7/17/2024).     GOAL: Patient will demonstrate a 1/3 increase in strength where deficient to assist with performing functional mobility and ADLs.

## 2024-02-14 NOTE — DIETITIAN INITIAL EVALUATION ADULT - ENTER FROM (G/KG)
[facility] INPATIENT ENCOUNTER  HEMATOLOGY/ONCOLOGY CONSULT NOTE    REASON FOR CONSULTATION:  We are seeing Jena Coates at the request of Ino Hinojosa PA-C / Jus Crain MD for our opinion regarding evaluation and management of pancytopenia.     SOURCE OF INFORMATION:  Patient and [place] electronic medical record    HISTORY OF PRESENT ILLNESS:    Jena Coates is a 40 year old male with a past medical history significant for Hep B cirrhosis who was admitted on 2/13/2024 after outpatient labs ordered by PCP revealed severe anemia in the context of chronic pancytopenia. Hematology has been consulted for additional recommendations.    Review of medical records shows thrombocytopenia dating back to 2018 and leukopenia and anemia dating back to 2022:   08/14/18 10:11 05/31/22 16:06 07/20/22 11:40 10/12/22 12:43 12/2/22 19:26   WBC 5.1 3.5 (L) 4.3 3.4 (L) 4.4   HGB 17.3 (H) 15.7 16.2 15.1 12.5 (L)   PLT 91 (L) 75 (L) 64 (L) 78 (L) 73 (L)     Pancytopenia persisted through 2022 and 2023 but his anemia has significantly worsened in the last couple of months along with a sharp drop in his MCV.     06/16/23 12:11 01/02/24 09:14 02/13/24 11:20   WBC 2.2 (L) 1.9 (L) 1.7 (L)   HGB 10.2 (L) 7.0 (L) 6.2 (LL)   PLT 54 (L) 46 (L) 42 (L)   MCV 86.8 71.4 (L) 70.3 (L)     Patient was diagnosed with hepatitis B in 1998. He underwent treatment from 2011 to 2014 but was subsequently lost to follow up. He was diagnosed with decompensated cirrhosis in 2022 and restarted on antivirals. He was evaluated by Abdominal Transplant team in 2023 and was deemed not to be a candidate for transplant. His most recent EGD done in June 2023 by Dr. Jus Crain showed 3 columns of small esophageal varices, as well as scars from prior variceal banding, and diffuse portal hypertensive gastropathy. His most recent liver ultrasound in January 2024 noted cirrhosis and marked splenomegaly with small volume ascites.    Patient seen  and examined this afternoon. He denies any symptoms aside from fatigue with exertion. He denies any melena or hematochezia. He denies any hematemesis. He denies pica. He denies any NSAID use and does not take any blood thinners.     MEDICATIONS AND ALLERGIES:    Medications and allergies were reviewed and updated.    HISTORIES:    Past medical history, surgical history, family history, and social history were reviewed and updated.    REVIEW OF SYSTEMS:    All other systems are reviewed and are negative except as documented in the history of present illness.    VITAL SIGNS:    Vital Last Value 24 Hour Range   Temperature 98.3 °F (36.8 °C) (02/14/24 0500) Temp  Min: 97.6 °F (36.4 °C)  Max: 98.3 °F (36.8 °C)   Pulse 71 (02/14/24 0900) Pulse  Min: 68  Max: 81   Respiratory 18 (02/14/24 0900) Resp  Min: 15  Max: 18   Non-Invasive  Blood Pressure 119/71 (02/14/24 0900) BP  Min: 106/72  Max: 122/71   Pulse Oximetry 100 % (02/14/24 0900) SpO2  Min: 100 %  Max: 100 %     Vital Today Admitted   Weight 69.5 kg (153 lb 3.5 oz) (02/13/24 2201) Weight: 69.5 kg (153 lb 3.5 oz) (02/13/24 2201)   Height N/A Height: 5' 7\" (170.2 cm) (02/13/24 2201)   BMI N/A BMI (Calculated): 24 (02/13/24 2201)     INTAKE/OUTPUT:  I/O last 3 completed shifts:  In: 755.3 [I.V.:211.3; Blood:544]  Out: -     PHYSICAL EXAM:  General: The patient is alert and in no distress.  Skin: Warm, normal color  Head: Normocephalic  Neck: Supple  Eyes: Normal conjunctivae and sclerae  ENT: Mucous membranes are moist  Cardiovascular: Regular rate and rhythm  Respiratory: Normal respiratory effort  Abdomen: palpable splenomegaly  Extremities: No lower extremity edema  Neurologic: Moves all extremities. Cranial nerves grossly intact  Psychiatric: Cooperative. Appropriate mood     LABORATORY DATA:  Recent Labs   Lab 02/14/24  0559   WBC 2.1*   RBC 3.80*   HGB 8.0*   HCT 28.2*   MCV 74.2*   PLT 38*   ANEUT 1.4*   ALYMS 0.4*   MARCELINO 0.2*   AEOS 0.1   ABASO 0.0     Recent  Labs   Lab 02/14/24  0559 02/13/24  1857   GLUCOSE 111* 150*   SODIUM 141 138   POTASSIUM 3.9 3.6   CHLORIDE 112* 107   CO2 26 27   BUN 12 14   CREATININE 0.80 0.69   CALCIUM 8.0* 8.0*   TOTPROTEIN  --  6.6   ALBUMIN  --  3.3*   AST  --  35   ALKPT  --  149*   GPT  --  23     Iron (mcg/dL)   Date Value   02/14/2024 18 (L)     Iron Binding Capacity (mcg/dL)   Date Value   02/14/2024 395     Iron, Percent Saturation (%)   Date Value   02/14/2024 5 (L)     Ferritin (ng/mL)   Date Value   02/14/2024 4 (L)     Vitamin B12 (pg/mL)   Date Value   12/04/2022 319     Folate (ng/mL)   Date Value   12/04/2022 10.4     IMAGING STUDIES:  US VASC LIVER AND PORTAL HEPATIC DUPLEX 1/10/2024  1.   Cirrhosis, no ultrasonographically evident mass. 2.   Patent hepatic vasculature with normal velocities and duplex waveforms. 3.   Marked splenomegaly, small volume ascites.    ASSESSMENT:  Severe iron deficiency anemia, new since January 2024, superimposed on #2  Chronic pancytopenia in context of #3  Hepatitis B cirrhosis  Esophageal varices in context of #3    PLAN:  Etiology of iron deficiency anemia is highly likely to be related to blood loss, especially in the context of esophageal varices. Gastroenterology team is following and EGD and colonoscopy are tentatively scheduled for 2/15/2024.  Pancytopenia has worsened only slightly over the last few months as compared to the anemia. This is very likely related to his cirrhosis. There is minimal concern for a primary bone marrow disorder at this time.  Patient has been transfused two units of packed red blood cells this admission. Will give additional iron in the form of IV iron sucrose 300 mg daily x 3 doses.  Transfuse 1 unit of packed red blood cells as needed to maintain hemoglobin > 7 g/dl.   Hematology will follow peripherally. Please reach out with any questions or concerns.    Patient will be seen and examined by the attending hematologist Dr. Deloris Luu.    Nisha (You)  TIA Thurston.  Hematology / Oncology Fellow PGY-V  2/14/2024 11:27 AM  Pager: [phone]     Attending note:    Patient resting comfortably getting iron infusion.  He has many questions about his GI endoscopies tomorrow.    Patient has a known history of cirrhosis of liver secondary to hepatitis-B.  He was admitted with significant microcytic anemia with workup showing iron-deficiency.  GI workup is appropriately ordered.      On examination, abdomen is nontender.  Bowel sounds are positive.     Hematology service is consulted for iron-deficiency anemia and chronic pancytopenia.  Chronic pancytopenia most likely is because of portal hypertension with splenomegaly and splenic sequestration.  Iron-deficiency anemia most likely because of the GI bleeding from his known varices and  portal hypertensive gastropathy.      Recommend iron replacement and GI workup.      Please call if any other questions.    I have personally interviewed and examined the patient via face-to-face. I confirmed the findings listed below:   Principal Problem:    Anemia, unspecified type  Active Problems:    GI bleed    This was discussed with the fellow and I agree with the assessment and plan as documented. The plan of care was discussed with the patient.    Thank you  Deloris Luu MD       1.1

## 2024-06-24 NOTE — END OF VISIT
[facility] RHEUMATOLOGY     AND INTERNAL MEDICINE    RHEUMATOLOGY PROGRESS NOTE  Kalpana Boothe 67 y.o. female  Chief Complaint   Patient presents with    Rheumatoid Arthritis     flare       SUBJECTIVE  Pt has been in a flare.   Feels the trigger was stress.   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   Developed diffuse pain, worse at feet.   Steroid taper worked well but once she stopped, noted her wrists and feet were hurting again.   Has modified her diet and she notes she is doing better today.        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 Tubular adenoma 2006 repeat in 2011    Consumes 2 to 3 servings of caffeine per day     De Quervain's disease (radial styloid tenosynovitis) 11/22/2015    Tendinitis 12/10/2015    History of tendinitis    Uterine fibroid 02/15/2008    Uterine prolapse 01/29/2008     Current Outpatient Medications   Medication Instructions    calcium carbonate 600 mg calcium (1,500 mg) tablet 1 tablet, oral, Daily    cholecalciferol (VITAMIN D-3) 25 mcg, oral, Daily    hydroxychloroquine (Plaquenil) 200 mg tablet oral, Daily    ibuprofen (AdviL) 200 mg tablet 1 tablet, oral, 3 times daily PRN     Allergies   Allergen Reactions    Penicillins Hives, Itching and Other     11/07/2005 UNKNOWN, PLEASE VERIFY, 11/07/2005 UNKNOWN, PLEASE VERIFY    Shrimp Unknown     Review of Systems   Constitutional:  Negative for fatigue  "and fever.   Respiratory:  Negative for cough and shortness of breath.    Cardiovascular:  Negative for leg swelling.   Musculoskeletal:  Positive for arthralgias and joint swelling. Negative for back pain, gait problem and myalgias.   Skin:  Negative for color change and rash.   Neurological:  Negative for weakness and numbness.     Social History     Tobacco Use    Smoking status: Never    Smokeless tobacco: Never   Substance Use Topics    Alcohol use: Yes     Comment: Occasionally    Drug use: Never     PHYSICAL EXAM  /84   Pulse 83   Temp 36.2 °C (97.2 °F)   Ht 1.676 m (5' 6\")   Wt 85.9 kg (189 lb 4.8 oz)   SpO2 93%   BMI 30.55 kg/m²   Physical Exam  Vitals reviewed.   Constitutional:       General: She is not in acute distress.     Appearance: Normal appearance.   HENT:      Head: Normocephalic and atraumatic.   Eyes:      General: No scleral icterus.     Extraocular Movements: Extraocular movements intact.      Conjunctiva/sclera: Conjunctivae normal.      Pupils: Pupils are equal, round, and reactive to light.   Pulmonary:      Effort: Pulmonary effort is normal. No respiratory distress.   Musculoskeletal:         General: Swelling present. No tenderness or deformity.      Cervical back: Normal range of motion and neck supple. No tenderness.      Right lower leg: No edema.      Left lower leg: No edema.      Comments: R wrist swelling but no other synovitis   Skin:     Coloration: Skin is not pale.      Findings: No erythema or rash.   Neurological:      General: No focal deficit present.      Mental Status: She is alert and oriented to person, place, and time. Mental status is at baseline.      Gait: Gait normal.   Psychiatric:         Mood and Affect: Mood normal.       Health Maintenance Due   Topic Date Due    Hepatitis C Screening  Never done    Zoster Vaccines (2 of 3) 12/05/2014    RSV Pregnant patients and/or  patients aged 60+ years (1 - 1-dose 60+ series) Never done    COVID-19 Vaccine " [Time Spent: ___ minutes] : I have spent [unfilled] minutes of time on the encounter. 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  Has found some relief with diet modification.  Educated on the anti-inflammatory diet and pt to try and see if that can provide sustained relief         Encounter for monitoring of hydroxychloroquine therapy    Overview     Eye exam 3/23, 3/24         Current Assessment & Plan     You are on chronic plaquenil.     Make sure you see your eye doctor yearly.--last done march 2024  Plaquenil is dosed based on your weight.   We will make sure your dose is below the maximum daily dose of 5mg/kg            Follow up: as scheduled

## 2024-08-09 NOTE — ED ADULT NURSE REASSESSMENT NOTE - SYMPTOMS
[Normal S1, S2] : normal S1, S2
none
[Clear Lung Fields] : clear lung fields
[de-identified] : RRR
none

## 2024-11-01 NOTE — ED ADULT NURSE NOTE - SUICIDE SCREENING QUESTION 2
Patient unable to complete Alert and oriented to person, place and time A-T Advancement Flap Text: The defect edges were debeveled with a #15 scalpel blade.  Given the location of the defect, shape of the defect and the proximity to free margins an A-T advancement flap was deemed most appropriate.  Using a sterile surgical marker, an appropriate advancement flap was drawn incorporating the defect and placing the expected incisions within the relaxed skin tension lines where possible.    The area thus outlined was incised deep to adipose tissue with a #15 scalpel blade.  The skin margins were undermined to an appropriate distance in all directions utilizing iris scissors.

## 2025-03-12 NOTE — PROGRESS NOTE ADULT - PROBLEM SELECTOR PLAN 1
Subjective:      Patient ID: Nkechi Shukla is a 39 y.o.  female who is here for evaluation and treatment of left knee pain related to mild arthritis.   The most recent  injection performed by Dr Huber on 7/24/2024 helped relieve the knee symptoms.    Pain has gradually returned.   Denies recent injury.   Pain is 1/10.   Pain is worse with weightbearing activities.   Pain improves somewhat with rest and elevation.    Review of Systems:   Denies fever or chills.  Denies numbness or tingling around the knee.    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    [facility]mit [facility]    Anesthesia complication     after colonoscopy-was lightheaded    Anxiety     COVID     Depression     Guillain Barré syndrome     Heartburn     HTN (hypertension)     Wears contact lenses     Wears glasses        Family History   Problem Relation Age of Onset    Hypertension Mother     Hypertension Father     Coronary Art Dis Father     High Blood Pressure Sister        Past Surgical History:   Procedure Laterality Date    CHOLECYSTECTOMY, LAPAROSCOPIC N/A 03/15/2021    [facility]    COLONOSCOPY  12/01/2017    [facility]- Normal    ENDOMETRIAL ABLATION      HERNIA REPAIR      LIPOMA RESECTION      Forehead    SHOULDER ARTHROSCOPY Right 1/10/2025    RIGHT SHOULDER ARTHROSCOPY SUBACROMIAL DECOMPRESSION performed by Heriberto Bran MD at [facility]    SHOULDER ARTHROSCOPY Right 1/10/2025    ROTATOR CUFF REPAIR POSSIBLE BIO INDUCTIVE IMPLANT AUGMENTATION performed by Heriberto Bran MD at [facility] OR    UMBILICAL HERNIA REPAIR  12/13/2017    [facility]- with mesh       Social History     Occupational History    Not on file   Tobacco Use    Smoking status: Never    Smokeless tobacco: Never   Vaping Use    Vaping status: Never Used   Substance and Sexual Activity    Alcohol use: Never    Drug use: Never    Sexual activity: Yes       Current Outpatient Medications   Medication Sig Dispense Refill     R sided weakness/numbness x 2 day, found to have Left parietal mass w/ vasogenic edema  - CTA neck with Left vertebral stenosis but good basilar filling.  - appreciate NSG recs: recommend MRI  MRI head with multiple masses, concerning for mets  CT chest/abd/pelv without signs of other malignancy, does have hilar lymphadenopathy   Ethics consulted for complicated GOC, spouse is the legal decision maker  Pulm plans for biopsy of lymph node s/p EBUS 5/5   Onc consulted, pending results   - continue decadron 4mg q12hr, taper  - continue keppra 500mg BID

## 2025-05-22 NOTE — ED PROVIDER NOTE - RATE
[facility]  Inpatient/Observation/Outpatient Rehabilitation    Date: 2025  Patient Name: Hany Hoskins       [] Inpatient Acute/Observation       [x]  Outpatient  : 2014       Plan of Care/Recert ends      [] Pt refused/declined therapy at this time due to:           [x] Pt cancelled due to:  [] No Reason Given   [] Sick/ill   [x] Other:  end of the year stuff going on    [] Evaluation held by RN/Provider/Physical Therapist due to:    [] High Heart Rate   [] High Blood Pressure   [] Orthopedic Consult   [] Hgb < 7   [] Other:    [] Pt ordered brace per physician request:  [] Proper fit will be completed and education for wearing/skin checks    [] Pt does not require skilled services due to:      Therapist/Assistant will attempt to see this patient, at our earliest opportunity.       Miguelina Amor Date: 2025     0
